# Patient Record
Sex: MALE | Race: WHITE | NOT HISPANIC OR LATINO | Employment: OTHER | URBAN - METROPOLITAN AREA
[De-identification: names, ages, dates, MRNs, and addresses within clinical notes are randomized per-mention and may not be internally consistent; named-entity substitution may affect disease eponyms.]

---

## 2023-03-09 DIAGNOSIS — I48.91 ATRIAL FIBRILLATION, UNSPECIFIED TYPE (MULTI): ICD-10-CM

## 2023-03-09 RX ORDER — VIT C/E/ZN/COPPR/LUTEIN/ZEAXAN 250MG-90MG
CAPSULE ORAL
COMMUNITY
Start: 2014-12-29 | End: 2023-11-10 | Stop reason: SDUPTHER

## 2023-03-09 RX ORDER — METFORMIN HYDROCHLORIDE 500 MG/1
500 TABLET ORAL
COMMUNITY
Start: 2021-02-22 | End: 2024-02-20 | Stop reason: WASHOUT

## 2023-03-09 RX ORDER — LANOLIN ALCOHOL/MO/W.PET/CERES
1 CREAM (GRAM) TOPICAL DAILY
COMMUNITY
Start: 2017-04-07 | End: 2023-11-10 | Stop reason: SDUPTHER

## 2023-03-09 RX ORDER — ASCORBIC ACID 500 MG
1 TABLET ORAL 2 TIMES DAILY
COMMUNITY
Start: 2022-08-16 | End: 2024-01-16 | Stop reason: WASHOUT

## 2023-03-09 RX ORDER — ASPIRIN 81 MG/1
1 TABLET ORAL DAILY
COMMUNITY
Start: 2014-12-29 | End: 2023-11-29 | Stop reason: WASHOUT

## 2023-03-09 RX ORDER — MULTIVITAMIN
1 TABLET ORAL DAILY
COMMUNITY

## 2023-03-09 RX ORDER — CYCLOBENZAPRINE HCL 5 MG
5 TABLET ORAL NIGHTLY
COMMUNITY
Start: 2020-06-05 | End: 2023-10-05 | Stop reason: HOSPADM

## 2023-03-09 RX ORDER — LEVOTHYROXINE SODIUM 50 UG/1
50 TABLET ORAL DAILY
COMMUNITY
End: 2023-03-27

## 2023-03-09 RX ORDER — NITROGLYCERIN 0.4 MG/1
0.4 TABLET SUBLINGUAL
COMMUNITY
Start: 2012-04-23 | End: 2023-10-05 | Stop reason: HOSPADM

## 2023-03-09 RX ORDER — BUSPIRONE HYDROCHLORIDE 7.5 MG/1
10 TABLET ORAL 2 TIMES DAILY
COMMUNITY
Start: 2022-08-16

## 2023-03-09 RX ORDER — BLOOD SUGAR DIAGNOSTIC
STRIP MISCELLANEOUS
COMMUNITY
Start: 2018-02-07 | End: 2023-11-29 | Stop reason: WASHOUT

## 2023-03-09 RX ORDER — DILTIAZEM HYDROCHLORIDE 360 MG/1
360 CAPSULE, EXTENDED RELEASE ORAL DAILY
COMMUNITY
End: 2023-03-27

## 2023-03-09 RX ORDER — PILOCARPINE HYDROCHLORIDE 5 MG/1
5 TABLET, FILM COATED ORAL 4 TIMES DAILY
COMMUNITY
End: 2023-07-05

## 2023-03-09 RX ORDER — PANTOPRAZOLE SODIUM 20 MG/1
20 TABLET, DELAYED RELEASE ORAL DAILY
COMMUNITY
End: 2023-03-27

## 2023-03-09 RX ORDER — BLOOD-GLUCOSE METER
EACH MISCELLANEOUS
COMMUNITY
Start: 2020-11-19 | End: 2023-10-05 | Stop reason: HOSPADM

## 2023-03-09 RX ORDER — HYDROXYCHLOROQUINE SULFATE 200 MG/1
1 TABLET, FILM COATED ORAL DAILY
COMMUNITY
Start: 2017-04-07 | End: 2023-03-27

## 2023-03-09 RX ORDER — METOPROLOL TARTRATE 100 MG/1
100 TABLET ORAL 2 TIMES DAILY
Qty: 180 TABLET | Refills: 1 | Status: SHIPPED | OUTPATIENT
Start: 2023-03-09 | End: 2023-07-09

## 2023-03-09 RX ORDER — LOSARTAN POTASSIUM 50 MG/1
0.5 TABLET ORAL DAILY
COMMUNITY
End: 2023-07-05

## 2023-03-09 RX ORDER — VILAZODONE HYDROCHLORIDE 40 MG/1
1 TABLET ORAL
COMMUNITY

## 2023-03-09 RX ORDER — LURASIDONE HYDROCHLORIDE 40 MG/1
40 TABLET, FILM COATED ORAL DAILY
COMMUNITY
End: 2023-11-10 | Stop reason: SDUPTHER

## 2023-03-09 RX ORDER — APIXABAN 5 MG/1
5 TABLET, FILM COATED ORAL 2 TIMES DAILY
COMMUNITY
End: 2023-03-13

## 2023-03-09 RX ORDER — METOPROLOL TARTRATE 100 MG/1
1 TABLET ORAL 2 TIMES DAILY
COMMUNITY
End: 2023-03-09 | Stop reason: SDUPTHER

## 2023-03-09 RX ORDER — TRAZODONE HYDROCHLORIDE 300 MG/1
300 TABLET ORAL NIGHTLY
COMMUNITY
End: 2023-10-05 | Stop reason: HOSPADM

## 2023-03-09 RX ORDER — HYDROXYZINE HYDROCHLORIDE 25 MG/1
1 TABLET, FILM COATED ORAL NIGHTLY PRN
COMMUNITY
Start: 2020-04-21 | End: 2023-03-27

## 2023-03-11 DIAGNOSIS — I48.91 UNSPECIFIED ATRIAL FIBRILLATION (MULTI): ICD-10-CM

## 2023-03-13 RX ORDER — APIXABAN 5 MG/1
TABLET, FILM COATED ORAL
Qty: 180 TABLET | Refills: 1 | Status: SHIPPED | OUTPATIENT
Start: 2023-03-13 | End: 2023-09-20

## 2023-03-26 DIAGNOSIS — I48.91 UNSPECIFIED ATRIAL FIBRILLATION (MULTI): ICD-10-CM

## 2023-03-27 DIAGNOSIS — E03.9 HYPOTHYROIDISM, UNSPECIFIED: ICD-10-CM

## 2023-03-27 DIAGNOSIS — G47.00 INSOMNIA, UNSPECIFIED: ICD-10-CM

## 2023-03-27 DIAGNOSIS — M32.9 SYSTEMIC LUPUS ERYTHEMATOSUS, UNSPECIFIED SLE TYPE, UNSPECIFIED ORGAN INVOLVEMENT STATUS (MULTI): Primary | ICD-10-CM

## 2023-03-27 DIAGNOSIS — M10.9 GOUT, UNSPECIFIED CAUSE, UNSPECIFIED CHRONICITY, UNSPECIFIED SITE: Primary | ICD-10-CM

## 2023-03-27 DIAGNOSIS — K21.9 GASTRO-ESOPHAGEAL REFLUX DISEASE WITHOUT ESOPHAGITIS: ICD-10-CM

## 2023-03-27 RX ORDER — LEVOTHYROXINE SODIUM 50 UG/1
TABLET ORAL
Qty: 90 TABLET | Refills: 1 | Status: SHIPPED | OUTPATIENT
Start: 2023-03-27 | End: 2023-09-20

## 2023-03-27 RX ORDER — HYDROXYZINE HYDROCHLORIDE 25 MG/1
TABLET, FILM COATED ORAL
Qty: 90 TABLET | Refills: 1 | Status: SHIPPED | OUTPATIENT
Start: 2023-03-27 | End: 2023-09-20

## 2023-03-27 RX ORDER — HYDROXYCHLOROQUINE SULFATE 200 MG/1
TABLET, FILM COATED ORAL
Qty: 90 TABLET | Refills: 3 | Status: SHIPPED | OUTPATIENT
Start: 2023-03-27 | End: 2024-02-20 | Stop reason: SDUPTHER

## 2023-03-27 RX ORDER — PANTOPRAZOLE SODIUM 20 MG/1
TABLET, DELAYED RELEASE ORAL
Qty: 90 TABLET | Refills: 1 | Status: SHIPPED | OUTPATIENT
Start: 2023-03-27 | End: 2024-02-20 | Stop reason: SDUPTHER

## 2023-03-27 RX ORDER — COLCHICINE 0.6 MG/1
TABLET ORAL
Qty: 30 TABLET | Refills: 2 | Status: SHIPPED | OUTPATIENT
Start: 2023-03-27 | End: 2023-05-21 | Stop reason: ALTCHOICE

## 2023-03-27 RX ORDER — DILTIAZEM HYDROCHLORIDE 360 MG/1
CAPSULE, EXTENDED RELEASE ORAL
Qty: 90 CAPSULE | Refills: 1 | Status: SHIPPED | OUTPATIENT
Start: 2023-03-27 | End: 2023-05-21

## 2023-04-13 ENCOUNTER — APPOINTMENT (OUTPATIENT)
Dept: PRIMARY CARE | Facility: CLINIC | Age: 68
End: 2023-04-13
Payer: MEDICARE

## 2023-05-02 PROCEDURE — 99223 1ST HOSP IP/OBS HIGH 75: CPT | Performed by: INTERNAL MEDICINE

## 2023-05-03 PROCEDURE — 99232 SBSQ HOSP IP/OBS MODERATE 35: CPT | Performed by: INTERNAL MEDICINE

## 2023-05-04 PROCEDURE — 99232 SBSQ HOSP IP/OBS MODERATE 35: CPT | Performed by: INTERNAL MEDICINE

## 2023-05-05 PROCEDURE — 99232 SBSQ HOSP IP/OBS MODERATE 35: CPT | Performed by: INTERNAL MEDICINE

## 2023-05-06 PROCEDURE — 99232 SBSQ HOSP IP/OBS MODERATE 35: CPT | Performed by: INTERNAL MEDICINE

## 2023-05-07 PROCEDURE — 99232 SBSQ HOSP IP/OBS MODERATE 35: CPT | Performed by: INTERNAL MEDICINE

## 2023-05-08 PROCEDURE — 99232 SBSQ HOSP IP/OBS MODERATE 35: CPT | Performed by: INTERNAL MEDICINE

## 2023-05-09 ENCOUNTER — APPOINTMENT (OUTPATIENT)
Dept: PRIMARY CARE | Facility: CLINIC | Age: 68
End: 2023-05-09
Payer: MEDICARE

## 2023-05-09 PROCEDURE — 99232 SBSQ HOSP IP/OBS MODERATE 35: CPT | Performed by: INTERNAL MEDICINE

## 2023-05-10 PROCEDURE — 99238 HOSP IP/OBS DSCHRG MGMT 30/<: CPT | Performed by: INTERNAL MEDICINE

## 2023-05-11 ENCOUNTER — DOCUMENTATION (OUTPATIENT)
Dept: PRIMARY CARE | Facility: CLINIC | Age: 68
End: 2023-05-11
Payer: MEDICARE

## 2023-05-11 ENCOUNTER — PATIENT OUTREACH (OUTPATIENT)
Dept: PRIMARY CARE | Facility: CLINIC | Age: 68
End: 2023-05-11
Payer: MEDICARE

## 2023-05-11 DIAGNOSIS — R07.9 CHEST PAIN, UNSPECIFIED TYPE: ICD-10-CM

## 2023-05-11 DIAGNOSIS — I50.9 CONGESTIVE HEART FAILURE, UNSPECIFIED HF CHRONICITY, UNSPECIFIED HEART FAILURE TYPE (MULTI): ICD-10-CM

## 2023-05-11 DIAGNOSIS — U07.1 COVID-19: ICD-10-CM

## 2023-05-11 RX ORDER — IPRATROPIUM BROMIDE AND ALBUTEROL SULFATE 2.5; .5 MG/3ML; MG/3ML
3 SOLUTION RESPIRATORY (INHALATION)
COMMUNITY
End: 2023-05-16 | Stop reason: SDUPTHER

## 2023-05-11 RX ORDER — ALLOPURINOL 100 MG/1
100 TABLET ORAL
Qty: 30 TABLET | Refills: 2 | COMMUNITY
Start: 2023-04-07 | End: 2023-07-06

## 2023-05-11 NOTE — PROGRESS NOTES
Discharge Facility: [facility]   Discharge Diagnosis: Chest pain, Covid-19, CHF  Admission Date: 5/3/2023  Discharge Date: 5/10/2023    PCP Appointment Date: 5/16/2023 1500  Specialist Appointment Date: None   Hospital Encounter and Summary: Linked   See discharge assessment below for further details     Engagement  Call Start Time: 1502 (5/12/2023  3:07 PM)    Medications  Medications reviewed with patient/caregiver?: Yes (No changes in medication regime made.) (5/12/2023  3:07 PM)  Is the patient having any side effects they believe may be caused by any medication additions or changes?: No (5/12/2023  3:07 PM)  Does the patient have all medications ordered at discharge?: Yes (5/12/2023  3:07 PM)  Care Management Interventions: No intervention needed (5/12/2023  3:07 PM)  Is the patient taking all medications as directed (includes completed medication regime)?: Yes (5/12/2023  3:07 PM)    Appointments  Does the patient have a primary care provider?: Yes (5/12/2023  3:07 PM)  Care Management Interventions: Verified appointment date/time/provider (Idania Sharma 5/16/2023 1500) (5/12/2023  3:07 PM)  Has the patient kept scheduled appointments due by today?: Not applicable (5/12/2023  3:07 PM)    Self Management  What is the home health agency?: N/A self care (5/12/2023  3:07 PM)  What Durable Medical Equipment (DME) was ordered?: N/A (5/12/2023  3:07 PM)    Patient Teaching  Does the patient have access to their discharge instructions?: Yes (5/12/2023  3:07 PM)  Care Management Interventions: Reviewed instructions with patient (5/12/2023  3:07 PM)  What is the patient's perception of their health status since discharge?: Same (Patient states he still feels SOB. No other complaints. Advised recovery may take time.) (5/12/2023  3:07 PM)  Is the patient/caregiver able to teach back the hierarchy of who to call/visit for symptoms/problems? PCP, Specialist, Home Health nurse, Urgent Care, ED, 911: Yes (5/12/2023  3:07  PM)

## 2023-05-16 ENCOUNTER — OFFICE VISIT (OUTPATIENT)
Dept: PRIMARY CARE | Facility: CLINIC | Age: 68
End: 2023-05-16
Payer: MEDICARE

## 2023-05-16 VITALS
OXYGEN SATURATION: 95 % | WEIGHT: 222 LBS | RESPIRATION RATE: 18 BRPM | DIASTOLIC BLOOD PRESSURE: 72 MMHG | BODY MASS INDEX: 35.68 KG/M2 | HEART RATE: 93 BPM | HEIGHT: 66 IN | SYSTOLIC BLOOD PRESSURE: 124 MMHG

## 2023-05-16 DIAGNOSIS — I25.10 ARTERIOSCLEROSIS OF CORONARY ARTERY: ICD-10-CM

## 2023-05-16 DIAGNOSIS — F33.9 RECURRENT MAJOR DEPRESSIVE DISORDER, REMISSION STATUS UNSPECIFIED (CMS-HCC): ICD-10-CM

## 2023-05-16 DIAGNOSIS — M06.9 RHEUMATOID ARTHRITIS, INVOLVING UNSPECIFIED SITE, UNSPECIFIED WHETHER RHEUMATOID FACTOR PRESENT (MULTI): ICD-10-CM

## 2023-05-16 DIAGNOSIS — K76.0 NAFLD (NONALCOHOLIC FATTY LIVER DISEASE): ICD-10-CM

## 2023-05-16 DIAGNOSIS — F41.9 ANXIETY DISORDER, UNSPECIFIED TYPE: ICD-10-CM

## 2023-05-16 DIAGNOSIS — M32.9 SYSTEMIC LUPUS ERYTHEMATOSUS, UNSPECIFIED SLE TYPE, UNSPECIFIED ORGAN INVOLVEMENT STATUS (MULTI): ICD-10-CM

## 2023-05-16 DIAGNOSIS — I48.91 ATRIAL FIBRILLATION, UNSPECIFIED TYPE (MULTI): ICD-10-CM

## 2023-05-16 DIAGNOSIS — Z09 HOSPITAL DISCHARGE FOLLOW-UP: ICD-10-CM

## 2023-05-16 DIAGNOSIS — E11.69 TYPE 2 DIABETES MELLITUS WITH OTHER SPECIFIED COMPLICATION, WITHOUT LONG-TERM CURRENT USE OF INSULIN (MULTI): ICD-10-CM

## 2023-05-16 DIAGNOSIS — J18.9 PNEUMONIA OF LEFT LUNG DUE TO INFECTIOUS ORGANISM, UNSPECIFIED PART OF LUNG: Primary | ICD-10-CM

## 2023-05-16 DIAGNOSIS — M35.00 SJOGREN'S SYNDROME, WITH UNSPECIFIED ORGAN INVOLVEMENT (MULTI): ICD-10-CM

## 2023-05-16 PROBLEM — J30.9 ALLERGIC RHINITIS: Status: RESOLVED | Noted: 2023-05-16 | Resolved: 2023-05-16

## 2023-05-16 PROBLEM — M17.11 ARTHRITIS OF KNEE, RIGHT: Status: ACTIVE | Noted: 2023-05-16

## 2023-05-16 PROBLEM — K21.9 ESOPHAGEAL REFLUX: Status: ACTIVE | Noted: 2023-05-16

## 2023-05-16 PROBLEM — E11.9 DIABETES MELLITUS (MULTI): Status: ACTIVE | Noted: 2023-05-16

## 2023-05-16 PROBLEM — K82.9 GALLBLADDER ATTACK: Status: RESOLVED | Noted: 2023-05-16 | Resolved: 2023-05-16

## 2023-05-16 PROBLEM — R40.0 DAYTIME SOMNOLENCE: Status: RESOLVED | Noted: 2023-05-16 | Resolved: 2023-05-16

## 2023-05-16 PROBLEM — D64.9 ANEMIA: Status: ACTIVE | Noted: 2023-05-16

## 2023-05-16 PROBLEM — K46.9 ABDOMINAL HERNIA: Status: ACTIVE | Noted: 2023-05-16

## 2023-05-16 PROBLEM — K43.9 ABDOMINAL WALL HERNIA: Status: ACTIVE | Noted: 2023-05-16

## 2023-05-16 LAB — POC HEMOGLOBIN A1C: 5.9 % (ref 4.2–6.5)

## 2023-05-16 PROCEDURE — 3078F DIAST BP <80 MM HG: CPT | Performed by: NURSE PRACTITIONER

## 2023-05-16 PROCEDURE — 1159F MED LIST DOCD IN RCRD: CPT | Performed by: NURSE PRACTITIONER

## 2023-05-16 PROCEDURE — 99214 OFFICE O/P EST MOD 30 MIN: CPT | Performed by: NURSE PRACTITIONER

## 2023-05-16 PROCEDURE — 99496 TRANSJ CARE MGMT HIGH F2F 7D: CPT | Performed by: NURSE PRACTITIONER

## 2023-05-16 PROCEDURE — 1036F TOBACCO NON-USER: CPT | Performed by: NURSE PRACTITIONER

## 2023-05-16 PROCEDURE — 1160F RVW MEDS BY RX/DR IN RCRD: CPT | Performed by: NURSE PRACTITIONER

## 2023-05-16 PROCEDURE — 3074F SYST BP LT 130 MM HG: CPT | Performed by: NURSE PRACTITIONER

## 2023-05-16 PROCEDURE — 4010F ACE/ARB THERAPY RXD/TAKEN: CPT | Performed by: NURSE PRACTITIONER

## 2023-05-16 PROCEDURE — 83036 HEMOGLOBIN GLYCOSYLATED A1C: CPT | Performed by: NURSE PRACTITIONER

## 2023-05-16 RX ORDER — PREDNISONE 20 MG/1
40 TABLET ORAL DAILY
Qty: 10 TABLET | Refills: 0 | Status: SHIPPED | OUTPATIENT
Start: 2023-05-16 | End: 2023-05-21

## 2023-05-16 RX ORDER — AMITRIPTYLINE HYDROCHLORIDE 75 MG/1
75 TABLET ORAL NIGHTLY
COMMUNITY
Start: 2014-06-25 | End: 2023-10-05 | Stop reason: HOSPADM

## 2023-05-16 RX ORDER — IPRATROPIUM BROMIDE AND ALBUTEROL SULFATE 2.5; .5 MG/3ML; MG/3ML
3 SOLUTION RESPIRATORY (INHALATION) EVERY 6 HOURS PRN
Qty: 180 ML | Refills: 3 | Status: SHIPPED | OUTPATIENT
Start: 2023-05-16 | End: 2023-09-12

## 2023-05-16 NOTE — PATIENT INSTRUCTIONS
Have chest xray done and labs  I will let you know how it looks - I may need to put you on antibiotics again  Please make an appt. With Dr. Bandar GALINDO    Your A1c was 5.7 today  Sent in steroids for your gout - will help with breathing as well

## 2023-05-16 NOTE — PROGRESS NOTES
"                                                                                                                                                                                                                                                                                              Subjective   Patient ID: Eugenio Bliss is a 67 y.o. male who presents for Hospital Follow-up (Patient in today for hospital F/U and A1C check. States that he was recently hospitalized for 8 days d/t Pneumonia. ).    HPI     Review of Systems    Objective   /72   Pulse 93   Resp 18   Ht 1.676 m (5' 6\")   Wt 101 kg (222 lb)   SpO2 95%   BMI 35.83 kg/m²     Physical Exam    Assessment/Plan          "

## 2023-05-21 PROBLEM — E29.1 TESTICULAR HYPOFUNCTION: Status: RESOLVED | Noted: 2023-05-21 | Resolved: 2023-05-21

## 2023-05-21 PROBLEM — K40.90 INGUINAL HERNIA: Status: RESOLVED | Noted: 2023-05-21 | Resolved: 2023-05-21

## 2023-05-21 PROBLEM — G47.33 MILD OBSTRUCTIVE SLEEP APNEA: Status: ACTIVE | Noted: 2023-05-21

## 2023-05-21 PROBLEM — G47.00 INSOMNIA: Status: RESOLVED | Noted: 2023-05-21 | Resolved: 2023-05-21

## 2023-05-21 PROBLEM — E55.9 VITAMIN D INSUFFICIENCY: Status: RESOLVED | Noted: 2023-05-21 | Resolved: 2023-05-21

## 2023-05-21 PROBLEM — K43.9 ABDOMINAL WALL HERNIA: Status: RESOLVED | Noted: 2023-05-16 | Resolved: 2023-05-21

## 2023-05-21 PROBLEM — M25.561 RIGHT KNEE PAIN: Status: RESOLVED | Noted: 2023-05-21 | Resolved: 2023-05-21

## 2023-05-21 PROBLEM — K46.9 ABDOMINAL HERNIA: Status: RESOLVED | Noted: 2023-05-16 | Resolved: 2023-05-21

## 2023-05-21 PROBLEM — K76.0 NAFLD (NONALCOHOLIC FATTY LIVER DISEASE): Status: ACTIVE | Noted: 2023-05-21

## 2023-05-21 PROBLEM — I48.91 AFIB (MULTI): Status: ACTIVE | Noted: 2023-05-21

## 2023-05-21 PROBLEM — E03.9 HYPOTHYROIDISM: Status: ACTIVE | Noted: 2023-05-21

## 2023-05-21 PROBLEM — G47.34 SLEEP RELATED HYPOXIA: Status: RESOLVED | Noted: 2023-05-21 | Resolved: 2023-05-21

## 2023-05-21 PROBLEM — K74.60 HEPATIC CIRRHOSIS (MULTI): Status: RESOLVED | Noted: 2023-05-21 | Resolved: 2023-05-21

## 2023-05-21 PROBLEM — E78.00 HYPERCHOLESTEREMIA: Status: ACTIVE | Noted: 2023-05-21

## 2023-05-21 PROBLEM — M06.9 RHEUMATOID ARTHRITIS (MULTI): Status: ACTIVE | Noted: 2023-05-21

## 2023-05-21 PROBLEM — R06.83 SNORING: Status: RESOLVED | Noted: 2023-05-21 | Resolved: 2023-05-21

## 2023-05-21 PROBLEM — M32.9 SYSTEMIC LUPUS ERYTHEMATOSUS (MULTI): Status: ACTIVE | Noted: 2023-05-21

## 2023-05-21 PROBLEM — J18.9 PNEUMONIA OF LEFT LUNG DUE TO INFECTIOUS ORGANISM: Status: ACTIVE | Noted: 2023-05-21

## 2023-05-21 PROBLEM — K74.60 HEPATIC CIRRHOSIS (MULTI): Status: ACTIVE | Noted: 2023-05-21

## 2023-05-21 PROBLEM — J33.9 NASAL POLYPS: Status: RESOLVED | Noted: 2023-05-21 | Resolved: 2023-05-21

## 2023-05-21 PROBLEM — M17.0 PRIMARY OSTEOARTHRITIS OF BOTH KNEES: Status: ACTIVE | Noted: 2023-05-21

## 2023-05-21 PROBLEM — I10 HYPERTENSION: Status: RESOLVED | Noted: 2023-05-21 | Resolved: 2023-05-21

## 2023-05-21 PROBLEM — Z09 HOSPITAL DISCHARGE FOLLOW-UP: Status: ACTIVE | Noted: 2023-05-21

## 2023-05-21 PROBLEM — M35.00 SJOGRENS SYNDROME (MULTI): Status: ACTIVE | Noted: 2023-05-21

## 2023-05-21 PROBLEM — D64.9 ANEMIA: Status: RESOLVED | Noted: 2023-05-16 | Resolved: 2023-05-21

## 2023-05-21 NOTE — PROGRESS NOTES
"Patient: Eugenio Bliss  : 1955  PCP: Idania Sharma, APRN-CNP  MRN: [id number]  Program: No linked episodes     Eugenio Bliss is a 67 y.o. male presenting today for follow-up after being discharged from the hospital 6 days ago. The main problem requiring admission was pneumonia. The discharge summary and/or Transitional Care Management documentation was reviewed. Medication reconciliation was performed as indicated via the \"Peyman as Reviewed\" timestamp.     Eugenio Bliss was contacted by Transitional Care Management services two days after his discharge. This encounter and supporting documentation was reviewed.    The complexity of medical decision making for this patient's transitional care is high.    He was admitted for 8 days to [facility] due to increased shortness of breath and pneumonia.  He is following up today for his diabetes as well.  Sugars have been well controlled.  He is denies polyuria, polydipsia, or blurred vision.  He does wear oxygen at bedtime  He is finding that he does tire easily and is short of breath.  He has not followed up with pulmonology.  [facility] Dr. Portillo while in the hospital    Physical Exam  Vitals and nursing note reviewed.   Constitutional:       Appearance: Normal appearance.   HENT:      Head: Normocephalic and atraumatic.      Right Ear: External ear normal.      Left Ear: External ear normal.      Nose: Nose normal.      Mouth/Throat:      Mouth: Mucous membranes are moist.      Pharynx: Oropharynx is clear.   Eyes:      Extraocular Movements: Extraocular movements intact.      Conjunctiva/sclera: Conjunctivae normal.      Pupils: Pupils are equal, round, and reactive to light.   Neck:      Vascular: No carotid bruit.   Cardiovascular:      Rate and Rhythm: Normal rate and regular rhythm.      Pulses: Normal pulses.      Heart sounds: Normal heart sounds.   Pulmonary:      Effort: Pulmonary effort is normal. No respiratory " distress.      Breath sounds: Normal breath sounds. No stridor. No wheezing or rhonchi.   Abdominal:      General: Bowel sounds are normal.   Musculoskeletal:         General: Normal range of motion.      Cervical back: Normal range of motion.      Right lower leg: No edema.      Left lower leg: No edema.   Lymphadenopathy:      Cervical: No cervical adenopathy.   Skin:     General: Skin is warm and dry.   Neurological:      General: No focal deficit present.      Mental Status: He is alert and oriented to person, place, and time. Mental status is at baseline.      Cranial Nerves: No cranial nerve deficit.      Motor: No weakness.   Psychiatric:         Mood and Affect: Mood normal.         Behavior: Behavior normal.         Thought Content: Thought content normal.         Judgment: Judgment normal.         Assessment/Plan   Problem List Items Addressed This Visit          Respiratory    Pneumonia of left lung due to infectious organism - Primary     Reviewed hospitalization  Repeat chest x-ray  Prednisone burst  Follow-up with pulmonology  Home nebulizer ordered  DuoNeb ordered         Relevant Medications    nebulizer accessories kit    ipratropium-albuteroL (Duo-Neb) 0.5-2.5 mg/3 mL nebulizer solution    predniSONE (Deltasone) 20 mg tablet    Other Relevant Orders    CBC    Comprehensive Metabolic Panel    XR chest 2 views (Completed)    Home nebulizer       Circulatory    Arteriosclerosis of coronary artery     Stable  Follows cardiology Dr. Ray  Continue metoprolol  Continue losartan  Reviewed labs from hospitalization  Continue Eliquis         Afib (CMS/[facility])       Digestive    NAFLD (nonalcoholic fatty liver disease)     Stable  Follows GI            Endocrine/Metabolic    Diabetes mellitus (CMS/[facility])     -  in office A1C 5.9   last A1C 5.7  -  in office /72  -  continue metformin   - continue diet changes of lower carb intake and increased protein and vegetable intake  - increase activity to help  with weight loss  - follow up 4 months         Relevant Orders    POCT glycosylated hemoglobin (Hb A1C) manually resulted (Completed)       Other    Anxiety disorder     Stable  Continue Latuda  Continue hydroxyzine  Continue trazodone  Continue Viibryd         Depression, major, recurrent (CMS/HCC)     Stable  Continue Latuda  Continue hydroxyzine  Continue trazodone  Continue Viibryd         Hospital discharge follow-up     Reviewed hospitalization  Repeat chest x-ray  Prednisone burst  Follow-up with pulmonology  Home nebulizer ordered  DuoNeb ordered         Rheumatoid arthritis (CMS/HCC)     Stable  Continue pilocarpine  Continue hydroxychloroquine  Follow-up with rheumatology         Sjogrens syndrome (CMS/HCC)     Stable  Continue pilocarpine  Continue hydroxychloroquine  Follow-up with rheumatology         Systemic lupus erythematosus (CMS/HCC)     Stable  Continue pilocarpine  Continue hydroxychloroquine  Follow-up with rheumatology            Review of Systems   All other systems reviewed and are negative.      No family history on file.    Engagement  Call Start Time: 1502 (5/12/2023  3:07 PM)    Medications  Medications reviewed with patient/caregiver?: Yes (No changes in medication regime made.) (5/12/2023  3:07 PM)  Is the patient having any side effects they believe may be caused by any medication additions or changes?: No (5/12/2023  3:07 PM)  Does the patient have all medications ordered at discharge?: Yes (5/12/2023  3:07 PM)  Care Management Interventions: No intervention needed (5/12/2023  3:07 PM)  Is the patient taking all medications as directed (includes completed medication regime)?: Yes (5/12/2023  3:07 PM)    Appointments  Does the patient have a primary care provider?: Yes (5/12/2023  3:07 PM)  Care Management Interventions: Verified appointment date/time/provider (Idania Sharma 5/16/2023 1500) (5/12/2023  3:07 PM)  Has the patient kept scheduled appointments due by today?: Not  applicable (5/12/2023  3:07 PM)    Self Management  What is the home health agency?: N/A self care (5/12/2023  3:07 PM)  What Durable Medical Equipment (DME) was ordered?: N/A (5/12/2023  3:07 PM)    Patient Teaching  Does the patient have access to their discharge instructions?: Yes (5/12/2023  3:07 PM)  Care Management Interventions: Reviewed instructions with patient (5/12/2023  3:07 PM)  What is the patient's perception of their health status since discharge?: Same (Patient states he still feels SOB. No other complaints. Advised recovery may take time.) (5/12/2023  3:07 PM)  Is the patient/caregiver able to teach back the hierarchy of who to call/visit for symptoms/problems? PCP, Specialist, Home Health nurse, Urgent Care, ED, 911: Yes (5/12/2023  3:07 PM)        No follow-ups on file.

## 2023-05-21 NOTE — ASSESSMENT & PLAN NOTE
Reviewed hospitalization  Repeat chest x-ray  Prednisone burst  Follow-up with pulmonology  Home nebulizer ordered  DuoNeb ordered

## 2023-05-21 NOTE — ASSESSMENT & PLAN NOTE
-  in office A1C 5.9   last A1C 5.7  -  in office /72  -  continue metformin   - continue diet changes of lower carb intake and increased protein and vegetable intake  - increase activity to help with weight loss  - follow up 4 months

## 2023-05-21 NOTE — ASSESSMENT & PLAN NOTE
Stable  Follows cardiology Dr. Ray  Continue metoprolol  Continue losartan  Reviewed labs from hospitalization  Continue Eliquis

## 2023-05-26 ENCOUNTER — PATIENT OUTREACH (OUTPATIENT)
Dept: PRIMARY CARE | Facility: CLINIC | Age: 68
End: 2023-05-26
Payer: MEDICARE

## 2023-05-26 NOTE — PROGRESS NOTES
Call regarding appt with Idania Sharma APR, CNP on 5/16/2023 after hospitalization for pneumonia. At time of outreach call, the patient feels as if his condition is about the same. The patient reports he is still very fatigued and continues to have shortness of breath. DuoNeb, home nebulizer and prednisone burst ordered at appt. The patient states he is taking all medication as directed. The patient has a FU with pulmonology next week. Patient has TCM contact info for questions or concerns.

## 2023-06-28 ENCOUNTER — PATIENT OUTREACH (OUTPATIENT)
Dept: PRIMARY CARE | Facility: CLINIC | Age: 68
End: 2023-06-28
Payer: MEDICARE

## 2023-06-28 NOTE — PROGRESS NOTES
Unable to reach patient for two month post discharge follow up call. LVM with call back number for patient to call if needed.

## 2023-07-04 DIAGNOSIS — M06.9 RHEUMATOID ARTHRITIS, UNSPECIFIED (MULTI): ICD-10-CM

## 2023-07-05 RX ORDER — LOSARTAN POTASSIUM 50 MG/1
TABLET ORAL
Qty: 135 TABLET | Refills: 1 | Status: SHIPPED | OUTPATIENT
Start: 2023-07-05 | End: 2023-10-25

## 2023-07-05 RX ORDER — PILOCARPINE HYDROCHLORIDE 5 MG/1
TABLET, FILM COATED ORAL
Qty: 360 TABLET | Refills: 1 | Status: SHIPPED | OUTPATIENT
Start: 2023-07-05 | End: 2023-10-05 | Stop reason: HOSPADM

## 2023-07-07 DIAGNOSIS — I48.91 ATRIAL FIBRILLATION, UNSPECIFIED TYPE (MULTI): ICD-10-CM

## 2023-07-09 RX ORDER — METOPROLOL TARTRATE 100 MG/1
TABLET ORAL
Qty: 180 TABLET | Refills: 1 | Status: SHIPPED | OUTPATIENT
Start: 2023-07-09 | End: 2023-11-10 | Stop reason: SDUPTHER

## 2023-08-08 ENCOUNTER — PATIENT OUTREACH (OUTPATIENT)
Dept: PRIMARY CARE | Facility: CLINIC | Age: 68
End: 2023-08-08
Payer: MEDICARE

## 2023-08-08 NOTE — PROGRESS NOTES
Call placed regarding 90 day TCM wrap-up of services. At the time of call, the patient states he is doing very well. No questions or concerns for primary care provider. The pt has met the 30 day and 90 day rehospitalization goals and discharged from TCM services.

## 2023-08-22 ENCOUNTER — APPOINTMENT (OUTPATIENT)
Dept: PRIMARY CARE | Facility: CLINIC | Age: 68
End: 2023-08-22
Payer: MEDICARE

## 2023-09-12 DIAGNOSIS — J18.9 PNEUMONIA OF LEFT LUNG DUE TO INFECTIOUS ORGANISM, UNSPECIFIED PART OF LUNG: ICD-10-CM

## 2023-09-12 RX ORDER — IPRATROPIUM BROMIDE AND ALBUTEROL SULFATE 2.5; .5 MG/3ML; MG/3ML
3 SOLUTION RESPIRATORY (INHALATION) EVERY 6 HOURS PRN
Qty: 180 ML | Refills: 3 | Status: SHIPPED | OUTPATIENT
Start: 2023-09-12 | End: 2023-11-10 | Stop reason: ALTCHOICE

## 2023-09-20 DIAGNOSIS — E03.9 HYPOTHYROIDISM, UNSPECIFIED: ICD-10-CM

## 2023-09-20 DIAGNOSIS — G47.00 INSOMNIA, UNSPECIFIED: ICD-10-CM

## 2023-09-20 DIAGNOSIS — I48.91 UNSPECIFIED ATRIAL FIBRILLATION (MULTI): ICD-10-CM

## 2023-09-20 RX ORDER — HYDROXYZINE HYDROCHLORIDE 25 MG/1
TABLET, FILM COATED ORAL
Qty: 90 TABLET | Refills: 1 | Status: SHIPPED | OUTPATIENT
Start: 2023-09-20 | End: 2023-11-10 | Stop reason: SDUPTHER

## 2023-09-20 RX ORDER — LEVOTHYROXINE SODIUM 50 UG/1
TABLET ORAL
Qty: 90 TABLET | Refills: 1 | Status: SHIPPED | OUTPATIENT
Start: 2023-09-20 | End: 2024-02-20 | Stop reason: SDUPTHER

## 2023-09-20 RX ORDER — APIXABAN 5 MG/1
5 TABLET, FILM COATED ORAL 2 TIMES DAILY
Qty: 180 TABLET | Refills: 1 | Status: SHIPPED | OUTPATIENT
Start: 2023-09-20 | End: 2024-02-20 | Stop reason: SDUPTHER

## 2023-09-30 ENCOUNTER — HOSPITAL ENCOUNTER (EMERGENCY)
Facility: HOSPITAL | Age: 68
Discharge: HOME | DRG: 552 | End: 2023-09-30
Attending: STUDENT IN AN ORGANIZED HEALTH CARE EDUCATION/TRAINING PROGRAM
Payer: MEDICARE

## 2023-09-30 VITALS
OXYGEN SATURATION: 97 % | WEIGHT: 209.22 LBS | SYSTOLIC BLOOD PRESSURE: 134 MMHG | HEART RATE: 114 BPM | RESPIRATION RATE: 24 BRPM | DIASTOLIC BLOOD PRESSURE: 84 MMHG | HEIGHT: 67 IN | TEMPERATURE: 100.4 F | BODY MASS INDEX: 32.84 KG/M2

## 2023-09-30 PROCEDURE — 99281 EMR DPT VST MAYX REQ PHY/QHP: CPT | Mod: 25

## 2023-09-30 PROCEDURE — 99285 EMERGENCY DEPT VISIT HI MDM: CPT | Performed by: STUDENT IN AN ORGANIZED HEALTH CARE EDUCATION/TRAINING PROGRAM

## 2023-09-30 ASSESSMENT — COLUMBIA-SUICIDE SEVERITY RATING SCALE - C-SSRS
1. IN THE PAST MONTH, HAVE YOU WISHED YOU WERE DEAD OR WISHED YOU COULD GO TO SLEEP AND NOT WAKE UP?: NO
2. HAVE YOU ACTUALLY HAD ANY THOUGHTS OF KILLING YOURSELF?: NO
6. HAVE YOU EVER DONE ANYTHING, STARTED TO DO ANYTHING, OR PREPARED TO DO ANYTHING TO END YOUR LIFE?: NO

## 2023-09-30 ASSESSMENT — PAIN DESCRIPTION - PAIN TYPE: TYPE: ACUTE PAIN

## 2023-09-30 ASSESSMENT — PAIN - FUNCTIONAL ASSESSMENT: PAIN_FUNCTIONAL_ASSESSMENT: 0-10

## 2023-09-30 ASSESSMENT — PAIN SCALES - GENERAL: PAINLEVEL_OUTOF10: 10 - WORST POSSIBLE PAIN

## 2023-09-30 ASSESSMENT — PAIN DESCRIPTION - LOCATION: LOCATION: FOOT

## 2023-09-30 ASSESSMENT — PAIN DESCRIPTION - ORIENTATION: ORIENTATION: LEFT

## 2023-09-30 ASSESSMENT — PAIN DESCRIPTION - DESCRIPTORS: DESCRIPTORS: ACHING;SHARP

## 2023-10-01 ENCOUNTER — APPOINTMENT (OUTPATIENT)
Dept: RADIOLOGY | Facility: HOSPITAL | Age: 68
DRG: 552 | End: 2023-10-01
Payer: MEDICARE

## 2023-10-01 ENCOUNTER — HOSPITAL ENCOUNTER (INPATIENT)
Facility: HOSPITAL | Age: 68
LOS: 4 days | Discharge: NURSING, REHAB OR LONG-TERM CARE | DRG: 552 | End: 2023-10-05
Attending: EMERGENCY MEDICINE | Admitting: INTERNAL MEDICINE
Payer: MEDICARE

## 2023-10-01 DIAGNOSIS — I63.9 CEREBROVASCULAR ACCIDENT (CVA), UNSPECIFIED MECHANISM (MULTI): ICD-10-CM

## 2023-10-01 DIAGNOSIS — M10.9 ACUTE GOUT OF LEFT ANKLE, UNSPECIFIED CAUSE: Primary | ICD-10-CM

## 2023-10-01 DIAGNOSIS — M10.9 ACUTE GOUT OF LEFT KNEE, UNSPECIFIED CAUSE: ICD-10-CM

## 2023-10-01 DIAGNOSIS — I63.9 STROKE OF UNKNOWN CAUSE (MULTI): ICD-10-CM

## 2023-10-01 LAB
ALBUMIN SERPL-MCNC: 4.2 G/DL (ref 3.5–5)
ALP BLD-CCNC: 87 U/L (ref 35–125)
ALT SERPL-CCNC: 12 U/L (ref 5–40)
ANION GAP SERPL CALC-SCNC: 17 MMOL/L
AST SERPL-CCNC: 18 U/L (ref 5–40)
BILIRUB DIRECT SERPL-MCNC: 0.5 MG/DL (ref 0–0.2)
BILIRUB SERPL-MCNC: 2.8 MG/DL (ref 0.1–1.2)
BUN SERPL-MCNC: 27 MG/DL (ref 8–25)
CALCIUM SERPL-MCNC: 9.9 MG/DL (ref 8.5–10.4)
CHLORIDE SERPL-SCNC: 97 MMOL/L (ref 97–107)
CO2 SERPL-SCNC: 21 MMOL/L (ref 24–31)
CREAT SERPL-MCNC: 1.1 MG/DL (ref 0.4–1.6)
ERYTHROCYTE [DISTWIDTH] IN BLOOD BY AUTOMATED COUNT: 14.3 % (ref 11.5–14.5)
GFR SERPL CREATININE-BSD FRML MDRD: 73 ML/MIN/1.73M*2
GLUCOSE BLD MANUAL STRIP-MCNC: 147 MG/DL (ref 74–99)
GLUCOSE SERPL-MCNC: 104 MG/DL (ref 65–99)
HCT VFR BLD AUTO: 46.6 % (ref 41–52)
HGB BLD-MCNC: 16.1 G/DL (ref 13.5–17.5)
MCH RBC QN AUTO: 30.1 PG (ref 26–34)
MCHC RBC AUTO-ENTMCNC: 34.5 G/DL (ref 32–36)
MCV RBC AUTO: 87 FL (ref 80–100)
NRBC BLD-RTO: 0 /100 WBCS (ref 0–0)
PLATELET # BLD AUTO: 153 X10*3/UL (ref 150–450)
PMV BLD AUTO: 9.2 FL (ref 7.5–11.5)
POTASSIUM SERPL-SCNC: 4 MMOL/L (ref 3.4–5.1)
PROT SERPL-MCNC: 8.1 G/DL (ref 5.9–7.9)
RBC # BLD AUTO: 5.34 X10*6/UL (ref 4.5–5.9)
SODIUM SERPL-SCNC: 135 MMOL/L (ref 133–145)
TROPONIN T SERPL-MCNC: 30 NG/L
WBC # BLD AUTO: 8 X10*3/UL (ref 4.4–11.3)

## 2023-10-01 PROCEDURE — 82248 BILIRUBIN DIRECT: CPT | Performed by: EMERGENCY MEDICINE

## 2023-10-01 PROCEDURE — G1004 CDSM NDSC: HCPCS

## 2023-10-01 PROCEDURE — 71045 X-RAY EXAM CHEST 1 VIEW: CPT | Mod: FY

## 2023-10-01 PROCEDURE — 1100000001 HC PRIVATE ROOM DAILY

## 2023-10-01 PROCEDURE — 80053 COMPREHEN METABOLIC PANEL: CPT | Performed by: EMERGENCY MEDICINE

## 2023-10-01 PROCEDURE — 84484 ASSAY OF TROPONIN QUANT: CPT | Performed by: EMERGENCY MEDICINE

## 2023-10-01 PROCEDURE — 85027 COMPLETE CBC AUTOMATED: CPT | Performed by: EMERGENCY MEDICINE

## 2023-10-01 PROCEDURE — 2500000001 HC RX 250 WO HCPCS SELF ADMINISTERED DRUGS (ALT 637 FOR MEDICARE OP): Performed by: INTERNAL MEDICINE

## 2023-10-01 PROCEDURE — 96374 THER/PROPH/DIAG INJ IV PUSH: CPT

## 2023-10-01 PROCEDURE — 36415 COLL VENOUS BLD VENIPUNCTURE: CPT | Performed by: EMERGENCY MEDICINE

## 2023-10-01 PROCEDURE — 2500000001 HC RX 250 WO HCPCS SELF ADMINISTERED DRUGS (ALT 637 FOR MEDICARE OP): Performed by: EMERGENCY MEDICINE

## 2023-10-01 PROCEDURE — 2500000004 HC RX 250 GENERAL PHARMACY W/ HCPCS (ALT 636 FOR OP/ED): Performed by: EMERGENCY MEDICINE

## 2023-10-01 PROCEDURE — 2500000004 HC RX 250 GENERAL PHARMACY W/ HCPCS (ALT 636 FOR OP/ED): Performed by: INTERNAL MEDICINE

## 2023-10-01 PROCEDURE — 2500000005 HC RX 250 GENERAL PHARMACY W/O HCPCS: Performed by: INTERNAL MEDICINE

## 2023-10-01 PROCEDURE — 99285 EMERGENCY DEPT VISIT HI MDM: CPT | Performed by: EMERGENCY MEDICINE

## 2023-10-01 PROCEDURE — 2060000001 HC INTERMEDIATE ICU ROOM DAILY

## 2023-10-01 PROCEDURE — 70551 MRI BRAIN STEM W/O DYE: CPT | Mod: MG

## 2023-10-01 RX ORDER — ATORVASTATIN CALCIUM 40 MG/1
40 TABLET, FILM COATED ORAL DAILY
Status: DISCONTINUED | OUTPATIENT
Start: 2023-10-01 | End: 2023-10-01

## 2023-10-01 RX ORDER — LEVOTHYROXINE SODIUM 50 UG/1
50 TABLET ORAL DAILY
Status: DISCONTINUED | OUTPATIENT
Start: 2023-10-01 | End: 2023-10-05 | Stop reason: HOSPADM

## 2023-10-01 RX ORDER — PREDNISONE 20 MG/1
20 TABLET ORAL ONCE
Status: COMPLETED | OUTPATIENT
Start: 2023-10-01 | End: 2023-10-01

## 2023-10-01 RX ORDER — OXYCODONE AND ACETAMINOPHEN 5; 325 MG/1; MG/1
1 TABLET ORAL EVERY 6 HOURS PRN
Qty: 12 TABLET | Refills: 0 | Status: SHIPPED | OUTPATIENT
Start: 2023-10-01 | End: 2023-10-04

## 2023-10-01 RX ORDER — NITROGLYCERIN 0.4 MG/1
0.4 TABLET SUBLINGUAL EVERY 5 MIN PRN
Status: DISCONTINUED | OUTPATIENT
Start: 2023-10-01 | End: 2023-10-05 | Stop reason: HOSPADM

## 2023-10-01 RX ORDER — ATORVASTATIN CALCIUM 40 MG/1
40 TABLET, FILM COATED ORAL NIGHTLY
Status: DISCONTINUED | OUTPATIENT
Start: 2023-10-01 | End: 2023-10-05 | Stop reason: HOSPADM

## 2023-10-01 RX ORDER — PANTOPRAZOLE SODIUM 20 MG/1
20 TABLET, DELAYED RELEASE ORAL DAILY
Status: DISCONTINUED | OUTPATIENT
Start: 2023-10-01 | End: 2023-10-05 | Stop reason: HOSPADM

## 2023-10-01 RX ORDER — OXYCODONE AND ACETAMINOPHEN 5; 325 MG/1; MG/1
1 TABLET ORAL ONCE
Status: COMPLETED | OUTPATIENT
Start: 2023-10-01 | End: 2023-10-01

## 2023-10-01 RX ORDER — PREDNISONE 10 MG/1
20 TABLET ORAL DAILY
Qty: 8 TABLET | Refills: 0 | Status: SHIPPED | OUTPATIENT
Start: 2023-10-01 | End: 2023-10-05

## 2023-10-01 RX ORDER — AMITRIPTYLINE HYDROCHLORIDE 25 MG/1
75 TABLET, FILM COATED ORAL NIGHTLY
Status: DISCONTINUED | OUTPATIENT
Start: 2023-10-01 | End: 2023-10-05 | Stop reason: HOSPADM

## 2023-10-01 RX ORDER — METFORMIN HYDROCHLORIDE 500 MG/1
500 TABLET ORAL
Status: DISCONTINUED | OUTPATIENT
Start: 2023-10-01 | End: 2023-10-05 | Stop reason: HOSPADM

## 2023-10-01 RX ORDER — LURASIDONE HYDROCHLORIDE 40 MG/1
40 TABLET, FILM COATED ORAL DAILY
Status: DISCONTINUED | OUTPATIENT
Start: 2023-10-01 | End: 2023-10-05 | Stop reason: HOSPADM

## 2023-10-01 RX ORDER — CYCLOBENZAPRINE HCL 10 MG
5 TABLET ORAL NIGHTLY
Status: DISCONTINUED | OUTPATIENT
Start: 2023-10-01 | End: 2023-10-05 | Stop reason: HOSPADM

## 2023-10-01 RX ORDER — HYDROXYCHLOROQUINE SULFATE 200 MG/1
200 TABLET, FILM COATED ORAL DAILY
Status: DISCONTINUED | OUTPATIENT
Start: 2023-10-01 | End: 2023-10-05 | Stop reason: HOSPADM

## 2023-10-01 RX ORDER — IPRATROPIUM BROMIDE AND ALBUTEROL SULFATE 2.5; .5 MG/3ML; MG/3ML
3 SOLUTION RESPIRATORY (INHALATION) EVERY 6 HOURS PRN
Status: DISCONTINUED | OUTPATIENT
Start: 2023-10-01 | End: 2023-10-05 | Stop reason: HOSPADM

## 2023-10-01 RX ORDER — BISMUTH SUBSALICYLATE 262 MG
1 TABLET,CHEWABLE ORAL DAILY
Status: DISCONTINUED | OUTPATIENT
Start: 2023-10-01 | End: 2023-10-05 | Stop reason: HOSPADM

## 2023-10-01 RX ORDER — PILOCARPINE HYDROCHLORIDE 5 MG/1
5 TABLET, FILM COATED ORAL 4 TIMES DAILY
Status: DISCONTINUED | OUTPATIENT
Start: 2023-10-01 | End: 2023-10-05 | Stop reason: HOSPADM

## 2023-10-01 RX ORDER — ASCORBIC ACID 500 MG
500 TABLET ORAL 2 TIMES DAILY
Status: DISCONTINUED | OUTPATIENT
Start: 2023-10-01 | End: 2023-10-05 | Stop reason: HOSPADM

## 2023-10-01 RX ORDER — ASPIRIN 81 MG/1
81 TABLET ORAL DAILY
Status: DISCONTINUED | OUTPATIENT
Start: 2023-10-01 | End: 2023-10-05 | Stop reason: HOSPADM

## 2023-10-01 RX ORDER — BUSPIRONE HYDROCHLORIDE 10 MG/1
10 TABLET ORAL 2 TIMES DAILY
Status: DISCONTINUED | OUTPATIENT
Start: 2023-10-01 | End: 2023-10-05 | Stop reason: HOSPADM

## 2023-10-01 RX ORDER — METOPROLOL TARTRATE 100 MG/1
100 TABLET ORAL 2 TIMES DAILY
Status: DISCONTINUED | OUTPATIENT
Start: 2023-10-01 | End: 2023-10-05 | Stop reason: HOSPADM

## 2023-10-01 RX ORDER — HYDROXYZINE HYDROCHLORIDE 10 MG/1
25 TABLET, FILM COATED ORAL EVERY 6 HOURS PRN
Status: DISCONTINUED | OUTPATIENT
Start: 2023-10-01 | End: 2023-10-01

## 2023-10-01 RX ORDER — HYDROXYZINE PAMOATE 25 MG/1
25 CAPSULE ORAL EVERY 6 HOURS PRN
Status: DISCONTINUED | OUTPATIENT
Start: 2023-10-01 | End: 2023-10-05 | Stop reason: HOSPADM

## 2023-10-01 RX ORDER — COLCHICINE 0.6 MG/1
0.6 TABLET ORAL DAILY
Qty: 14 TABLET | Refills: 0 | Status: SHIPPED | OUTPATIENT
Start: 2023-10-01 | End: 2023-10-15

## 2023-10-01 RX ORDER — COLCHICINE 0.6 MG/1
1.2 TABLET ORAL ONCE
Status: COMPLETED | OUTPATIENT
Start: 2023-10-01 | End: 2023-10-01

## 2023-10-01 RX ORDER — ASPIRIN 325 MG
325 TABLET ORAL ONCE
Status: COMPLETED | OUTPATIENT
Start: 2023-10-01 | End: 2023-10-01

## 2023-10-01 RX ORDER — LORAZEPAM 2 MG/ML
1 INJECTION INTRAMUSCULAR ONCE
Status: COMPLETED | OUTPATIENT
Start: 2023-10-01 | End: 2023-10-01

## 2023-10-01 RX ORDER — POLYETHYLENE GLYCOL 3350 17 G/17G
17 POWDER, FOR SOLUTION ORAL DAILY
Status: DISCONTINUED | OUTPATIENT
Start: 2023-10-01 | End: 2023-10-05 | Stop reason: HOSPADM

## 2023-10-01 RX ORDER — TRAZODONE HYDROCHLORIDE 100 MG/1
300 TABLET ORAL NIGHTLY
Status: DISCONTINUED | OUTPATIENT
Start: 2023-10-01 | End: 2023-10-05 | Stop reason: HOSPADM

## 2023-10-01 RX ADMIN — OXYCODONE HYDROCHLORIDE AND ACETAMINOPHEN 500 MG: 500 TABLET ORAL at 21:05

## 2023-10-01 RX ADMIN — LORAZEPAM 1 MG: 2 INJECTION INTRAMUSCULAR; INTRAVENOUS at 12:34

## 2023-10-01 RX ADMIN — ASPIRIN 325 MG: 325 TABLET ORAL at 12:00

## 2023-10-01 RX ADMIN — APIXABAN 5 MG: 5 TABLET, FILM COATED ORAL at 21:04

## 2023-10-01 RX ADMIN — BUSPIRONE HYDROCHLORIDE 10 MG: 10 TABLET ORAL at 21:04

## 2023-10-01 RX ADMIN — OXYCODONE HYDROCHLORIDE AND ACETAMINOPHEN 1 TABLET: 5; 325 TABLET ORAL at 10:19

## 2023-10-01 RX ADMIN — PREDNISONE 20 MG: 20 TABLET ORAL at 10:19

## 2023-10-01 RX ADMIN — PILOCARPINE HYDROCHLORIDE 5 MG: 5 TABLET, FILM COATED ORAL at 21:05

## 2023-10-01 RX ADMIN — COLCHICINE 1.2 MG: 0.6 TABLET, FILM COATED ORAL at 10:19

## 2023-10-01 RX ADMIN — PANTOPRAZOLE SODIUM 20 MG: 20 TABLET, DELAYED RELEASE ORAL at 17:56

## 2023-10-01 RX ADMIN — Medication: at 20:00

## 2023-10-01 RX ADMIN — AMITRIPTYLINE HYDROCHLORIDE 75 MG: 25 TABLET, FILM COATED ORAL at 21:05

## 2023-10-01 RX ADMIN — ATORVASTATIN CALCIUM 40 MG: 40 TABLET, FILM COATED ORAL at 21:04

## 2023-10-01 RX ADMIN — PILOCARPINE HYDROCHLORIDE 5 MG: 5 TABLET, FILM COATED ORAL at 18:23

## 2023-10-01 SDOH — ECONOMIC STABILITY: INCOME INSECURITY: IN THE LAST 12 MONTHS, WAS THERE A TIME WHEN YOU WERE NOT ABLE TO PAY THE MORTGAGE OR RENT ON TIME?: NO

## 2023-10-01 SDOH — HEALTH STABILITY: PHYSICAL HEALTH: ON AVERAGE, HOW MANY DAYS PER WEEK DO YOU ENGAGE IN MODERATE TO STRENUOUS EXERCISE (LIKE A BRISK WALK)?: 1 DAY

## 2023-10-01 SDOH — ECONOMIC STABILITY: INCOME INSECURITY: HOW HARD IS IT FOR YOU TO PAY FOR THE VERY BASICS LIKE FOOD, HOUSING, MEDICAL CARE, AND HEATING?: NOT HARD AT ALL

## 2023-10-01 SDOH — SOCIAL STABILITY: SOCIAL INSECURITY: WITHIN THE LAST YEAR, HAVE YOU BEEN HUMILIATED OR EMOTIONALLY ABUSED IN OTHER WAYS BY YOUR PARTNER OR EX-PARTNER?: NO

## 2023-10-01 SDOH — SOCIAL STABILITY: SOCIAL NETWORK: ARE YOU MARRIED, WIDOWED, DIVORCED, SEPARATED, NEVER MARRIED, OR LIVING WITH A PARTNER?: MARRIED

## 2023-10-01 SDOH — SOCIAL STABILITY: SOCIAL INSECURITY
WITHIN THE LAST YEAR, HAVE TO BEEN RAPED OR FORCED TO HAVE ANY KIND OF SEXUAL ACTIVITY BY YOUR PARTNER OR EX-PARTNER?: NO

## 2023-10-01 SDOH — ECONOMIC STABILITY: INCOME INSECURITY: IN THE PAST 12 MONTHS, HAS THE ELECTRIC, GAS, OIL, OR WATER COMPANY THREATENED TO SHUT OFF SERVICE IN YOUR HOME?: NO

## 2023-10-01 SDOH — SOCIAL STABILITY: SOCIAL NETWORK
DO YOU BELONG TO ANY CLUBS OR ORGANIZATIONS SUCH AS CHURCH GROUPS UNIONS, FRATERNAL OR ATHLETIC GROUPS, OR SCHOOL GROUPS?: YES

## 2023-10-01 SDOH — SOCIAL STABILITY: SOCIAL INSECURITY: DO YOU FEEL ANYONE HAS EXPLOITED OR TAKEN ADVANTAGE OF YOU FINANCIALLY OR OF YOUR PERSONAL PROPERTY?: NO

## 2023-10-01 SDOH — SOCIAL STABILITY: SOCIAL INSECURITY: WITHIN THE LAST YEAR, HAVE YOU BEEN AFRAID OF YOUR PARTNER OR EX-PARTNER?: NO

## 2023-10-01 SDOH — HEALTH STABILITY: PHYSICAL HEALTH: ON AVERAGE, HOW MANY MINUTES DO YOU ENGAGE IN EXERCISE AT THIS LEVEL?: 20 MIN

## 2023-10-01 SDOH — SOCIAL STABILITY: SOCIAL INSECURITY: DOES ANYONE TRY TO KEEP YOU FROM HAVING/CONTACTING OTHER FRIENDS OR DOING THINGS OUTSIDE YOUR HOME?: NO

## 2023-10-01 SDOH — ECONOMIC STABILITY: FOOD INSECURITY: WITHIN THE PAST 12 MONTHS, YOU WORRIED THAT YOUR FOOD WOULD RUN OUT BEFORE YOU GOT MONEY TO BUY MORE.: NEVER TRUE

## 2023-10-01 SDOH — SOCIAL STABILITY: SOCIAL INSECURITY
WITHIN THE LAST YEAR, HAVE YOU BEEN KICKED, HIT, SLAPPED, OR OTHERWISE PHYSICALLY HURT BY YOUR PARTNER OR EX-PARTNER?: NO

## 2023-10-01 SDOH — ECONOMIC STABILITY: HOUSING INSECURITY
IN THE LAST 12 MONTHS, WAS THERE A TIME WHEN YOU DID NOT HAVE A STEADY PLACE TO SLEEP OR SLEPT IN A SHELTER (INCLUDING NOW)?: NO

## 2023-10-01 SDOH — HEALTH STABILITY: MENTAL HEALTH
STRESS IS WHEN SOMEONE FEELS TENSE, NERVOUS, ANXIOUS, OR CAN'T SLEEP AT NIGHT BECAUSE THEIR MIND IS TROUBLED. HOW STRESSED ARE YOU?: NOT AT ALL

## 2023-10-01 SDOH — SOCIAL STABILITY: SOCIAL NETWORK: HOW OFTEN DO YOU ATTENT MEETINGS OF THE CLUB OR ORGANIZATION YOU BELONG TO?: NEVER

## 2023-10-01 SDOH — SOCIAL STABILITY: SOCIAL INSECURITY: HAVE YOU HAD THOUGHTS OF HARMING ANYONE ELSE?: NO

## 2023-10-01 SDOH — SOCIAL STABILITY: SOCIAL INSECURITY: HAS ANYONE EVER THREATENED TO HURT YOUR FAMILY OR YOUR PETS?: NO

## 2023-10-01 SDOH — SOCIAL STABILITY: SOCIAL NETWORK: HOW OFTEN DO YOU GET TOGETHER WITH FRIENDS OR RELATIVES?: ONCE A WEEK

## 2023-10-01 SDOH — SOCIAL STABILITY: SOCIAL INSECURITY: ABUSE: ADULT

## 2023-10-01 SDOH — ECONOMIC STABILITY: TRANSPORTATION INSECURITY
IN THE PAST 12 MONTHS, HAS LACK OF TRANSPORTATION KEPT YOU FROM MEETINGS, WORK, OR FROM GETTING THINGS NEEDED FOR DAILY LIVING?: NO

## 2023-10-01 SDOH — ECONOMIC STABILITY: FOOD INSECURITY: WITHIN THE PAST 12 MONTHS, THE FOOD YOU BOUGHT JUST DIDN'T LAST AND YOU DIDN'T HAVE MONEY TO GET MORE.: NEVER TRUE

## 2023-10-01 SDOH — SOCIAL STABILITY: SOCIAL INSECURITY: ARE YOU OR HAVE YOU BEEN THREATENED OR ABUSED PHYSICALLY, EMOTIONALLY, OR SEXUALLY BY ANYONE?: NO

## 2023-10-01 SDOH — SOCIAL STABILITY: SOCIAL NETWORK: HOW OFTEN DO YOU ATTEND CHURCH OR RELIGIOUS SERVICES?: NEVER

## 2023-10-01 SDOH — SOCIAL STABILITY: SOCIAL INSECURITY: DO YOU FEEL UNSAFE GOING BACK TO THE PLACE WHERE YOU ARE LIVING?: NO

## 2023-10-01 SDOH — ECONOMIC STABILITY: HOUSING INSECURITY: IN THE LAST 12 MONTHS, HOW MANY PLACES HAVE YOU LIVED?: 1

## 2023-10-01 SDOH — ECONOMIC STABILITY: TRANSPORTATION INSECURITY
IN THE PAST 12 MONTHS, HAS THE LACK OF TRANSPORTATION KEPT YOU FROM MEDICAL APPOINTMENTS OR FROM GETTING MEDICATIONS?: NO

## 2023-10-01 SDOH — SOCIAL STABILITY: SOCIAL NETWORK: IN A TYPICAL WEEK, HOW MANY TIMES DO YOU TALK ON THE PHONE WITH FAMILY, FRIENDS, OR NEIGHBORS?: ONCE A WEEK

## 2023-10-01 SDOH — SOCIAL STABILITY: SOCIAL INSECURITY: ARE THERE ANY APPARENT SIGNS OF INJURIES/BEHAVIORS THAT COULD BE RELATED TO ABUSE/NEGLECT?: NO

## 2023-10-01 ASSESSMENT — ACTIVITIES OF DAILY LIVING (ADL)
TOILETING: INDEPENDENT
HEARING - LEFT EAR: FUNCTIONAL
DRESSING YOURSELF: INDEPENDENT
BATHING: INDEPENDENT
ASSISTIVE_DEVICE: EYEGLASSES
ADEQUATE_TO_COMPLETE_ADL: YES
WALKS IN HOME: INDEPENDENT
HEARING - RIGHT EAR: FUNCTIONAL
JUDGMENT_ADEQUATE_SAFELY_COMPLETE_DAILY_ACTIVITIES: UNABLE TO ASSESS
GROOMING: INDEPENDENT
FEEDING YOURSELF: INDEPENDENT
PATIENT'S MEMORY ADEQUATE TO SAFELY COMPLETE DAILY ACTIVITIES?: NO

## 2023-10-01 ASSESSMENT — COGNITIVE AND FUNCTIONAL STATUS - GENERAL
MOVING FROM LYING ON BACK TO SITTING ON SIDE OF FLAT BED WITH BEDRAILS: A LITTLE
EATING MEALS: A LITTLE
STANDING UP FROM CHAIR USING ARMS: A LITTLE
PERSONAL GROOMING: A LITTLE
WALKING IN HOSPITAL ROOM: A LOT
DAILY ACTIVITIY SCORE: 18
MOVING TO AND FROM BED TO CHAIR: A LITTLE
TURNING FROM BACK TO SIDE WHILE IN FLAT BAD: A LITTLE
MOBILITY SCORE: 16
CLIMB 3 TO 5 STEPS WITH RAILING: A LOT
PATIENT BASELINE BEDBOUND: NO
DRESSING REGULAR UPPER BODY CLOTHING: A LITTLE
DRESSING REGULAR LOWER BODY CLOTHING: A LITTLE
TOILETING: A LITTLE
HELP NEEDED FOR BATHING: A LITTLE

## 2023-10-01 ASSESSMENT — PAIN - FUNCTIONAL ASSESSMENT: PAIN_FUNCTIONAL_ASSESSMENT: 0-10

## 2023-10-01 ASSESSMENT — PATIENT HEALTH QUESTIONNAIRE - PHQ9
SUM OF ALL RESPONSES TO PHQ9 QUESTIONS 1 & 2: 0
1. LITTLE INTEREST OR PLEASURE IN DOING THINGS: NOT AT ALL
2. FEELING DOWN, DEPRESSED OR HOPELESS: NOT AT ALL

## 2023-10-01 ASSESSMENT — LIFESTYLE VARIABLES
HOW OFTEN DO YOU HAVE A DRINK CONTAINING ALCOHOL: NEVER
AUDIT-C TOTAL SCORE: 0
HOW MANY STANDARD DRINKS CONTAINING ALCOHOL DO YOU HAVE ON A TYPICAL DAY: PATIENT DOES NOT DRINK
SKIP TO QUESTIONS 9-10: 1
AUDIT-C TOTAL SCORE: 0
HOW OFTEN DO YOU HAVE 6 OR MORE DRINKS ON ONE OCCASION: NEVER

## 2023-10-01 ASSESSMENT — COLUMBIA-SUICIDE SEVERITY RATING SCALE - C-SSRS
2. HAVE YOU ACTUALLY HAD ANY THOUGHTS OF KILLING YOURSELF?: NO
6. HAVE YOU EVER DONE ANYTHING, STARTED TO DO ANYTHING, OR PREPARED TO DO ANYTHING TO END YOUR LIFE?: NO
1. IN THE PAST MONTH, HAVE YOU WISHED YOU WERE DEAD OR WISHED YOU COULD GO TO SLEEP AND NOT WAKE UP?: NO

## 2023-10-01 ASSESSMENT — PAIN SCALES - GENERAL: PAINLEVEL_OUTOF10: 9

## 2023-10-01 NOTE — CARE PLAN
The patient's goals for the shift include  decrease leg pain    The clinical goals for the shift include decrease pain
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
## 2023-10-01 NOTE — H&P
History Of Present Illness  Eugenio Bliss is a 67 y.o. male presenting with left leg pain x 3 days which feels like gout.  He is unable to move Left leg..  Takes Eliquis     Past Medical History  He has a past medical history of Abdominal hernia (05/16/2023), Achilles tendinitis, right leg, Acute frontal sinusitis, unspecified (02/03/2020), Allergic rhinitis (05/16/2023), Body mass index (BMI) 39.0-39.9, adult (05/25/2021), CHF (congestive heart failure) (CMS/Formerly [facility]), Chondrocostal junction syndrome (tietze) (07/22/2013), Contact with and (suspected) exposure to covid-19, COPD (chronic obstructive pulmonary disease) (CMS/Formerly [facility]), Deficiency of other specified B group vitamins (12/03/2019), Diabetes mellitus (CMS/Formerly [facility]), Dry eye syndrome of unspecified lacrimal gland (12/29/2014), Effusion, right ankle (06/13/2018), Encounter for screening for malignant neoplasm of colon (05/19/2016), Encounter for screening for malignant neoplasm of prostate (04/24/2019), Gallbladder attack (05/16/2023), Hypertension (05/21/2023), Inguinal hernia (05/21/2023), Obesity, unspecified (05/04/2022), Other conditions influencing health status (04/22/2013), Other conditions influencing health status (08/14/2019), Other conditions influencing health status (04/22/2013), Pain in right ankle and joints of right foot, Pain in unspecified elbow (07/10/2017), Personal history of diseases of the blood and blood-forming organs and certain disorders involving the immune mechanism (12/03/2019), Personal history of diseases of the blood and blood-forming organs and certain disorders involving the immune mechanism (12/03/2019), Personal history of other (healed) physical injury and trauma (04/16/2019), Personal history of other diseases of the digestive system (12/03/2019), Personal history of other diseases of the digestive system (07/08/2022), Personal history of other diseases of the respiratory system, Personal history of other diseases of urinary system  (12/03/2019), Personal history of other endocrine, nutritional and metabolic disease (04/22/2013), Personal history of other endocrine, nutritional and metabolic disease (07/29/2016), Personal history of other specified conditions (10/17/2019), Personal history of other specified conditions, Personal history of other specified conditions (09/06/2013), Personal history of other specified conditions (04/22/2013), Personal history of pneumonia (recurrent) (09/04/2020), Pleurodynia (06/05/2020), Pure hypercholesterolemia, unspecified (11/08/2013), Right knee pain (05/21/2023), Sjogren syndrome, unspecified (CMS/HCC), Snoring (05/21/2023), Stiffness of right ankle, not elsewhere classified (06/13/2018), 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    Gout  COPD, never smoked  CRF on 2L qHS  HTN  Hypothyroid  Chronic pain  DM II (8.3)  GERD  SLE  Depression  HPL  Diverticulosis  Sjogrens  MAURICE, BIPAP.    Last admitted 5/23 by Adonis    allopurinol 100 mg Tablet Directions: 1 tablet oral daily By: Teto Lamb MD Last Dose Given: 100 MG 05/10/2023 09:50 AM (Active)   apixaban (Eliquis) 5 mg Tablet Directions: 5 mg oral twice a day By: Teto Lamb MD Last Dose Given: 5 MG 05/10/2023 09:50 AM (Active)   aspirin 81 mg tablet,chewable Directions: 2 tablet oral daily By: Teto Lamb MD Last Dose Given: 162 MG 05/09/2023 08:07 AM (Active)   busPIRone 10 mg Tablet Directions: 1 tablet oral twice a day By: Teto Lamb MD Last Dose Given: 10 MG 05/10/2023 09:50 AM (Active)   doxycycline hyclate 100 mg Capsule Directions: 1 capsule oral every twelve hours Extended Instructions: GIVE 1 HOUR BEFORE OR 2 HOURS AFTER DAIRY PRODUCTS, ANTACIDS, MAGNESIUM, CALCIUM, ZINC OR IRON CONTAINING PRODUCTS. By: Teto Lamb MD Last Dose Given: 100 MG 05/10/2023 11:55 AM (Active)   hydroxychloroquine 200 mg Tablet  "Directions: 1 tablet oral daily By: Teto Lamb MD Last Dose Given: 200 MG 05/10/2023 09:50 AM (Active)   ipratropium-albuterol 0.5 mg-3 mg (2.5 mg base)/3 mL Solution for Nebulization Directions: 3 mL by inhalation four times daily By: Teto Lamb MD Last Dose Given: 3 ML 05/10/2023 12:16 PM (Active)   levothyroxine (Synthroid) 50 mcg Tablet Directions: 1 tablet oral daily before breakfast By: Teto Lamb MD Last Dose Given: 50 MCG 05/10/2023 05:47 AM (Active)   losartan 50 mg Tablet Directions: 1.5 tablet oral daily By: Teto Lamb MD Last Dose Given: 75 MG 05/10/2023 09:50 AM (Active)   metoprolol tartrate 100 mg Tablet Directions: 1 tablet oral twice a day By: Teto Lamb MD Last Dose Given: 100 MG 05/10/2023 09:50 AM (Active)   nitroglycerin (Nitrostat) 0.4 mg Tablet, Sublingual Directions: 1 tablet sublingual daily Chest Pain Extended Instructions: MAY REPEAT UP TO 3 DOSES By: Teto Lamb MD (Active)   \"PA/lateral chest x-ray. Diagnosis: J18.9. Results to Dr. Cali. Please obtain 1-2 days prior to appointment Dr. Cali.\"   pantoprazole 20 mg tablet,delayed release (DR/EC) Directions: 1 tablet oral daily By: Teto Lamb MD Last Dose Given: 20 MG 05/10/2023 09:50 AM (Active)   pilocarpine HCl 5 mg Tablet Directions: 1 tablet oral four times daily By: Teto Lamb MD Last Dose Given: 5 MG 05/10/2023 09:50 AM (Active)   traZODone 100 mg Tablet Directions: 3 tablet oral daily at bedtime By: Teto Lamb MD Last Dose Given: 300 MG 05/09/2023 09:29 PM (Active)  Active PRN   acetaminophen 325 mg Tablet Directions: 2 tablet oral every four hours PRN Mild pain and/or fever >38.5 Extended Instructions: THIS ORDER IS SUPERSEDED BY ANY OTHER MEDICATION ORDER FOR MILD PAIN (1-3). MAY BE USED FOR A HIGHER PAIN SCORE IF PATIENT REQUESTS By: Teto Lamb MD Last Dose Given: 650 MG 05/07/2023 08:19 PM (Active)   benzonatate 100 mg Capsule Directions: 1 capsule oral three times a " day PRN Cough By: Teto Lamb MD (Active)   hydrOXYzine pamoate 25 mg Capsule Directions: 1 capsule oral daily at bedtime PRN Insomnia By: Teto Lamb MD Last Dose Given: 25 MG 05/09/2023 09:29 PM (Active)   magnesium hydroxide (Milk of Magnesia) 400 mg/5 mL Suspension Directions: 30 mL oral daily at bedtime PRN Constipation Extended Instructions: FOLLOW DUMPS GUIDANCE (AVAILABLE VIA [facility] NURSING LINKS) WHEN MULTIPLE MEDS ARE ORDERED PRN CONSTIPATION. By: Teto Lamb MD (Active)  Surgical History  He has a past surgical history that includes Ankle surgery (04/17/2013); Knee surgery (04/17/2013); and Hernia repair (04/17/2013).      Bilateral knee arthroscopic partial meniscectomy. Multiple bilateral foot surgeries (x8).     Social History  He reports that he has never smoked. He has never used smokeless tobacco. He reports that he does not currently use alcohol. He reports that he does not use drugs.    Family History      Mother diabetic high blood pressure father diabetic.           Allergies  Ciprofloxacin, Levofloxacin, and Penicillins    Review of Systems     Physical Exam     Last Recorded Vitals  /66   Pulse 70   Temp 36.9 °C (98.4 °F) (Oral)   Resp 15   Wt 94.9 kg (209 lb 3.5 oz)   SpO2 96%     Alert appropriate poor hygiene  Lungs clear  Cor RRR  Dense LLE weakness    Relevant Results  Results for orders placed or performed during the hospital encounter of 10/01/23 (from the past 24 hour(s))   CBC   Result Value Ref Range    WBC 8.0 4.4 - 11.3 x10*3/uL    nRBC 0.0 0.0 - 0.0 /100 WBCs    RBC 5.34 4.50 - 5.90 x10*6/uL    Hemoglobin 16.1 13.5 - 17.5 g/dL    Hematocrit 46.6 41.0 - 52.0 %    MCV 87 80 - 100 fL    MCH 30.1 26.0 - 34.0 pg    MCHC 34.5 32.0 - 36.0 g/dL    RDW 14.3 11.5 - 14.5 %    Platelets 153 150 - 450 x10*3/uL    MPV 9.2 7.5 - 11.5 fL   Basic Metabolic Panel   Result Value Ref Range    Glucose 104 (H) 65 - 99 mg/dL    Sodium 135 133 - 145 mmol/L    Potassium 4.0 3.4  - 5.1 mmol/L    Chloride 97 97 - 107 mmol/L    Bicarbonate 21 (L) 24 - 31 mmol/L    Urea Nitrogen 27 (H) 8 - 25 mg/dL    Creatinine 1.10 0.40 - 1.60 mg/dL    eGFR 73 >60 mL/min/1.73m*2    Calcium 9.9 8.5 - 10.4 mg/dL    Anion Gap 17 <=19 mmol/L   Hepatic Function Panel   Result Value Ref Range    AST 18 5 - 40 U/L    ALT 12 5 - 40 U/L    Alkaline Phosphatase 87 35 - 125 U/L    Bilirubin, Total 2.8 (H) 0.1 - 1.2 mg/dL    Bilirubin, Direct 0.5 (H) 0.0 - 0.2 mg/dL    Total Protein 8.1 (H) 5.9 - 7.9 g/dL    Albumin 4.2 3.5 - 5.0 g/dL   Troponin T   Result Value Ref Range    Troponin T, High Sensitivity 30 (H) <=15 ng/L          XR chest 1 view    Result Date: 10/1/2023  Interpreted By:  Arnie Zamarripa, STUDY: XR CHEST 1 VIEW; 10/1/2023 11:24 am   INDICATION: Signs/Symptoms:left leg weakness, stroke.   COMPARISON: 05/16/2023   ACCESSION NUMBER(S): [id number]   ORDERING CLINICIAN: KASSY DOOLEY   TECHNIQUE: 1 view of the chest was performed.   FINDINGS: The lungs are adequately inflated. No acute consolidation. No pleural effusion. No pneumothorax.  The cardiomediastinal silhouette is within normal limits.       No acute cardiopulmonary disease.   Signed by: Arnie Zamarripa 10/1/2023 12:13 PM Dictation workstation:   [id number]    CT head wo IV contrast    Result Date: 10/1/2023  Interpreted By:  Yaron Thomas, STUDY: CT HEAD WO IV CONTRAST; 10/1/2023 10:53 am   INDICATION: anticoagulated head injury. Left knee and left leg weakness   COMPARISON: No comparison exams available.   ACCESSION NUMBER(S): [id number]   ORDERING CLINICIAN: KASSY DOOLEY   TECHNIQUE: CT axial images through the Brain were obtained without contrast.   FINDINGS: Acute ischemic change: None.   Hemorrhage: No evidence of acute intracranial hemorrhage.   Mass Effect / Mass Lesion: No significant mass effect. There is no evidence of an intracranial mass or extraaxial fluid collection.   Chronic ischemic change: Patchy foci of  low attenuation  coefficient are present within white matter which is a nonspecific finding but likely represents moderate microvascular ischemia.   Parenchyma: There is moderate generalized volume loss for age. The brain parenchyma is otherwise within normal limits for age.   Ventricles: Ventriculomegaly concordant with the degree of parenchymal volume loss.   Other: The visualized calvarium, skull base, orbits and extracranial soft tissues are normal.       No acute intracranial process   MACRO: none   Signed by: Yaron Thomas 10/1/2023 11:15 AM Dictation workstation:   [id number]        Assessment/Plan   Principal Problem:    Stroke of unknown cause (CMS/HCC)    Patient Active Problem List   Diagnosis    Anxiety disorder    Arteriosclerosis of coronary artery    Arthritis of knee, right    Depression, major, recurrent (CMS/HCC)    Diabetes mellitus (CMS/HCC)    Esophageal reflux    Pneumonia of left lung due to infectious organism    Hospital discharge follow-up    Afib (CMS/HCC)    Hypercholesteremia    Hypothyroidism    Mild obstructive sleep apnea    NAFLD (nonalcoholic fatty liver disease)    Primary osteoarthritis of both knees    Rheumatoid arthritis (CMS/HCC)    Sjogrens syndrome (CMS/HCC)    Systemic lupus erythematosus (CMS/HCC)    Stroke of unknown cause (CMS/HCC)        Admit to SDU  MRI Brain   Neurology Consult    Melo Perea MD

## 2023-10-02 ENCOUNTER — APPOINTMENT (OUTPATIENT)
Dept: RADIOLOGY | Facility: HOSPITAL | Age: 68
DRG: 552 | End: 2023-10-02
Payer: MEDICARE

## 2023-10-02 LAB
ERYTHROCYTE [SEDIMENTATION RATE] IN BLOOD BY WESTERGREN METHOD: 43 MM/H (ref 0–20)
GLUCOSE BLD MANUAL STRIP-MCNC: 117 MG/DL (ref 74–99)
GLUCOSE BLD MANUAL STRIP-MCNC: 121 MG/DL (ref 74–99)
GLUCOSE BLD MANUAL STRIP-MCNC: 165 MG/DL (ref 74–99)
GLUCOSE BLD MANUAL STRIP-MCNC: 186 MG/DL (ref 74–99)
URATE SERPL-MCNC: 10.4 MG/DL (ref 3.6–7.7)

## 2023-10-02 PROCEDURE — 2500000004 HC RX 250 GENERAL PHARMACY W/ HCPCS (ALT 636 FOR OP/ED): Performed by: INTERNAL MEDICINE

## 2023-10-02 PROCEDURE — 97165 OT EVAL LOW COMPLEX 30 MIN: CPT | Mod: GO

## 2023-10-02 PROCEDURE — 36415 COLL VENOUS BLD VENIPUNCTURE: CPT | Performed by: INTERNAL MEDICINE

## 2023-10-02 PROCEDURE — 97110 THERAPEUTIC EXERCISES: CPT | Mod: GP

## 2023-10-02 PROCEDURE — 2060000001 HC INTERMEDIATE ICU ROOM DAILY

## 2023-10-02 PROCEDURE — G1004 CDSM NDSC: HCPCS

## 2023-10-02 PROCEDURE — 2500000002 HC RX 250 W HCPCS SELF ADMINISTERED DRUGS (ALT 637 FOR MEDICARE OP, ALT 636 FOR OP/ED): Performed by: INTERNAL MEDICINE

## 2023-10-02 PROCEDURE — 85652 RBC SED RATE AUTOMATED: CPT | Performed by: INTERNAL MEDICINE

## 2023-10-02 PROCEDURE — 97161 PT EVAL LOW COMPLEX 20 MIN: CPT | Mod: GP

## 2023-10-02 PROCEDURE — 2500000001 HC RX 250 WO HCPCS SELF ADMINISTERED DRUGS (ALT 637 FOR MEDICARE OP): Performed by: INTERNAL MEDICINE

## 2023-10-02 PROCEDURE — 84550 ASSAY OF BLOOD/URIC ACID: CPT | Performed by: INTERNAL MEDICINE

## 2023-10-02 PROCEDURE — 82947 ASSAY GLUCOSE BLOOD QUANT: CPT

## 2023-10-02 PROCEDURE — 1100000001 HC PRIVATE ROOM DAILY

## 2023-10-02 PROCEDURE — 97535 SELF CARE MNGMENT TRAINING: CPT | Mod: GO

## 2023-10-02 RX ORDER — LORAZEPAM 2 MG/ML
2 INJECTION INTRAMUSCULAR ONCE
Status: DISCONTINUED | OUTPATIENT
Start: 2023-10-02 | End: 2023-10-02

## 2023-10-02 RX ORDER — COLCHICINE 0.6 MG/1
0.6 TABLET ORAL
Status: DISCONTINUED | OUTPATIENT
Start: 2023-10-02 | End: 2023-10-03

## 2023-10-02 RX ORDER — OXYCODONE HYDROCHLORIDE 5 MG/1
5 TABLET ORAL EVERY 6 HOURS PRN
Status: DISCONTINUED | OUTPATIENT
Start: 2023-10-02 | End: 2023-10-02

## 2023-10-02 RX ORDER — KETOROLAC TROMETHAMINE 30 MG/ML
15 INJECTION, SOLUTION INTRAMUSCULAR; INTRAVENOUS EVERY 6 HOURS PRN
Status: DISCONTINUED | OUTPATIENT
Start: 2023-10-02 | End: 2023-10-05 | Stop reason: HOSPADM

## 2023-10-02 RX ORDER — ACETAMINOPHEN 325 MG/1
325 TABLET ORAL 4 TIMES DAILY
Status: DISCONTINUED | OUTPATIENT
Start: 2023-10-02 | End: 2023-10-05 | Stop reason: HOSPADM

## 2023-10-02 RX ORDER — DEXAMETHASONE 4 MG/1
4 TABLET ORAL EVERY 8 HOURS SCHEDULED
Status: DISCONTINUED | OUTPATIENT
Start: 2023-10-02 | End: 2023-10-05 | Stop reason: HOSPADM

## 2023-10-02 RX ORDER — LORAZEPAM 2 MG/ML
2 INJECTION INTRAMUSCULAR ONCE
Status: COMPLETED | OUTPATIENT
Start: 2023-10-02 | End: 2023-10-02

## 2023-10-02 RX ADMIN — ACETAMINOPHEN 325 MG: 325 TABLET, FILM COATED ORAL at 17:44

## 2023-10-02 RX ADMIN — APIXABAN 5 MG: 5 TABLET, FILM COATED ORAL at 09:02

## 2023-10-02 RX ADMIN — KETOROLAC TROMETHAMINE 15 MG: 30 INJECTION, SOLUTION INTRAMUSCULAR at 11:39

## 2023-10-02 RX ADMIN — DEXAMETHASONE 4 MG: 4 TABLET ORAL at 14:35

## 2023-10-02 RX ADMIN — CYCLOBENZAPRINE 5 MG: 10 TABLET, FILM COATED ORAL at 21:34

## 2023-10-02 RX ADMIN — DEXAMETHASONE 4 MG: 4 TABLET ORAL at 21:59

## 2023-10-02 RX ADMIN — ACETAMINOPHEN 325 MG: 325 TABLET, FILM COATED ORAL at 21:32

## 2023-10-02 RX ADMIN — OXYCODONE HYDROCHLORIDE AND ACETAMINOPHEN 500 MG: 500 TABLET ORAL at 21:35

## 2023-10-02 RX ADMIN — METOPROLOL 100 MG: 100 TABLET ORAL at 21:34

## 2023-10-02 RX ADMIN — BUSPIRONE HYDROCHLORIDE 10 MG: 10 TABLET ORAL at 21:35

## 2023-10-02 RX ADMIN — METOPROLOL 100 MG: 100 TABLET ORAL at 09:02

## 2023-10-02 RX ADMIN — APIXABAN 5 MG: 5 TABLET, FILM COATED ORAL at 21:34

## 2023-10-02 RX ADMIN — MULTIVITAMIN TABLET 1 TABLET: TABLET at 09:02

## 2023-10-02 RX ADMIN — METFORMIN HYDROCHLORIDE 500 MG: 500 TABLET, FILM COATED ORAL at 17:44

## 2023-10-02 RX ADMIN — COLCHICINE 0.6 MG: 0.6 TABLET, FILM COATED ORAL at 14:35

## 2023-10-02 RX ADMIN — LORAZEPAM 2 MG: 2 INJECTION INTRAMUSCULAR; INTRAVENOUS at 20:30

## 2023-10-02 RX ADMIN — ATORVASTATIN CALCIUM 40 MG: 40 TABLET, FILM COATED ORAL at 21:34

## 2023-10-02 RX ADMIN — HYDROXYCHLOROQUINE SULFATE 200 MG: 200 TABLET ORAL at 09:02

## 2023-10-02 RX ADMIN — POLYETHYLENE GLYCOL 3350 17 G: 17 POWDER, FOR SOLUTION ORAL at 09:02

## 2023-10-02 RX ADMIN — PILOCARPINE HYDROCHLORIDE 5 MG: 5 TABLET, FILM COATED ORAL at 12:28

## 2023-10-02 RX ADMIN — TRAZODONE HYDROCHLORIDE 300 MG: 100 TABLET ORAL at 21:33

## 2023-10-02 RX ADMIN — LEVOTHYROXINE SODIUM 50 MCG: 0.05 TABLET ORAL at 09:02

## 2023-10-02 RX ADMIN — BUSPIRONE HYDROCHLORIDE 10 MG: 10 TABLET ORAL at 09:02

## 2023-10-02 RX ADMIN — ACETAMINOPHEN 325 MG: 325 TABLET, FILM COATED ORAL at 12:28

## 2023-10-02 RX ADMIN — PANTOPRAZOLE SODIUM 20 MG: 20 TABLET, DELAYED RELEASE ORAL at 09:02

## 2023-10-02 RX ADMIN — AMITRIPTYLINE HYDROCHLORIDE 75 MG: 25 TABLET, FILM COATED ORAL at 21:35

## 2023-10-02 RX ADMIN — ASPIRIN 81 MG: 81 TABLET, COATED ORAL at 09:02

## 2023-10-02 RX ADMIN — OXYCODONE HYDROCHLORIDE AND ACETAMINOPHEN 500 MG: 500 TABLET ORAL at 09:02

## 2023-10-02 RX ADMIN — PILOCARPINE HYDROCHLORIDE 5 MG: 5 TABLET, FILM COATED ORAL at 05:21

## 2023-10-02 RX ADMIN — METFORMIN HYDROCHLORIDE 500 MG: 500 TABLET, FILM COATED ORAL at 09:02

## 2023-10-02 RX ADMIN — LURASIDONE HYDROCHLORIDE 40 MG: 40 TABLET, FILM COATED ORAL at 10:46

## 2023-10-02 RX ADMIN — PILOCARPINE HYDROCHLORIDE 5 MG: 5 TABLET, FILM COATED ORAL at 17:44

## 2023-10-02 RX ADMIN — PILOCARPINE HYDROCHLORIDE 5 MG: 5 TABLET, FILM COATED ORAL at 21:35

## 2023-10-02 RX ADMIN — DEXAMETHASONE 4 MG: 4 TABLET ORAL at 10:47

## 2023-10-02 ASSESSMENT — COGNITIVE AND FUNCTIONAL STATUS - GENERAL
MOBILITY SCORE: 16
TURNING FROM BACK TO SIDE WHILE IN FLAT BAD: A LITTLE
DRESSING REGULAR LOWER BODY CLOTHING: A LITTLE
DAILY ACTIVITIY SCORE: 19
DRESSING REGULAR UPPER BODY CLOTHING: A LITTLE
TOILETING: A LITTLE
MOBILITY SCORE: 19
WALKING IN HOSPITAL ROOM: A LITTLE
CLIMB 3 TO 5 STEPS WITH RAILING: A LOT
PERSONAL GROOMING: A LITTLE
STANDING UP FROM CHAIR USING ARMS: A LITTLE
MOVING TO AND FROM BED TO CHAIR: A LITTLE
MOVING TO AND FROM BED TO CHAIR: A LITTLE
CLIMB 3 TO 5 STEPS WITH RAILING: A LOT
MOVING FROM LYING ON BACK TO SITTING ON SIDE OF FLAT BED WITH BEDRAILS: A LITTLE
STANDING UP FROM CHAIR USING ARMS: A LITTLE
DAILY ACTIVITIY SCORE: 22
PERSONAL GROOMING: A LITTLE
TOILETING: A LITTLE
HELP NEEDED FOR BATHING: A LITTLE
WALKING IN HOSPITAL ROOM: A LOT

## 2023-10-02 ASSESSMENT — PAIN SCALES - PAIN ASSESSMENT IN ADVANCED DEMENTIA (PAINAD)
BREATHING: NORMAL
BODYLANGUAGE: RELAXED
CONSOLABILITY: NO NEED TO CONSOLE

## 2023-10-02 ASSESSMENT — PAIN SCALES - GENERAL
PAINLEVEL_OUTOF10: 7
PAINLEVEL_OUTOF10: 8
PAINLEVEL_OUTOF10: 6
PAINLEVEL_OUTOF10: 0 - NO PAIN
PAINLEVEL_OUTOF10: 9
PAINLEVEL_OUTOF10: 10 - WORST POSSIBLE PAIN
PAINLEVEL_OUTOF10: 7
PAINLEVEL_OUTOF10: 10 - WORST POSSIBLE PAIN
PAINLEVEL_OUTOF10: 10 - WORST POSSIBLE PAIN

## 2023-10-02 ASSESSMENT — PAIN - FUNCTIONAL ASSESSMENT
PAIN_FUNCTIONAL_ASSESSMENT: WONG-BAKER FACES
PAIN_FUNCTIONAL_ASSESSMENT: 0-10

## 2023-10-02 ASSESSMENT — ACTIVITIES OF DAILY LIVING (ADL)
BATHING_ASSISTANCE: SPONGE BATHING
HOME_MANAGEMENT_TIME_ENTRY: 17

## 2023-10-02 ASSESSMENT — PAIN DESCRIPTION - DESCRIPTORS: DESCRIPTORS: OTHER (COMMENT)

## 2023-10-02 NOTE — CARE PLAN
Problem: Mobility  Goal: ambulation  Description: 150 ft with FWW mod independent  Outcome: Progressing  Goal: Stair Negotiation  Description: Pt will ascend/descend 3 steps with 2 railing mod independent  Outcome: Progressing     Problem: Transfers  Goal: sit to/from stand  Description: Sit to/.from stand mod independent  Outcome: Progressing  Goal: bed mobility  Description: Pt demonstrated supine to/from sit mod independent  Outcome: Progressing

## 2023-10-02 NOTE — PROGRESS NOTES
Eugenio Bliss is a 67 y.o. male on day 1 of admission presenting with left lower extremity radiculopathy foot pain and leg pain.  Is undergone a CT scan of the brain which was negative and MRI of the brain which was negative for stroke.  He is accompanied by his son who was present in the room informed of the results MRI of the lumbar spine has been requested as well as pain medications will initiate Decadron and ketorolac and continuous Tylenol.      Subjective      Seen and examined patient resistant to examination of the lower extremity but does have range of motion of the knee and hip appears quite anxious.  States he is unable to weight-bear on his left leg    Objective     Last Recorded Vitals  /64   Pulse 75   Temp 36.3 °C (97.3 °F) (Oral)   Resp 19   Wt 94.9 kg (209 lb 3.5 oz)   SpO2 92%   Intake/Output last 3 Shifts:    Intake/Output Summary (Last 24 hours) at 10/2/2023 0932  Last data filed at 10/2/2023 0307  Gross per 24 hour   Intake 240 ml   Output 501 ml   Net -261 ml       Admission Weight  Weight: 94.9 kg (209 lb 3.5 oz) (10/01/23 0947)    Daily Weight  10/01/23 : 94.9 kg (209 lb 3.5 oz)    Image Results  MR brain wo IV contrast  Narrative: Interpreted By:  Arnie Zamarripa,   STUDY:  MR BRAIN WO IV CONTRAST; 10/1/2023 1:13 pm      INDICATION:  Signs/Symptoms:left leg weakness;      COMPARISON:  CT head obtained earlier on 10/01/2023      ACCESSION NUMBER(S):  [id number]      ORDERING CLINICIAN:  KASSY DOOLEY      TECHNIQUE:  Multiple, multiplanar sequences of the brain were acquired.      FINDINGS:  No focal mass effect or midline shift is identified. The ventricles  and sulci are symmetric and appropriate for the patient's age.      Mild nonspecific white matter changes most consistent with chronic  small-vessel ischemic disease. The gray-white matter differentiation  is preserved. No restricted diffusion is seen.      No acute intracranial hemorrhage is seen. No intra-axial  or  extra-axial fluid collection is seen.      The visualized paranasal sinuses and mastoid air cells are clear.      Impression: No evidence for acute intracranial pathology.      Mild nonspecific white matter change most consistent with chronic  small-vessel ischemic disease.      Signed by: Arnie Zamarripa 10/1/2023 2:05 PM  Dictation workstation:   [id number]  XR chest 1 view  Narrative: Interpreted By:  Arnie Zamarripa,   STUDY:  XR CHEST 1 VIEW; 10/1/2023 11:24 am      INDICATION:  Signs/Symptoms:left leg weakness, stroke.      COMPARISON:  05/16/2023      ACCESSION NUMBER(S):  [id number]      ORDERING CLINICIAN:  KASSY DOOLEY      TECHNIQUE:  1 view of the chest was performed.      FINDINGS:  The lungs are adequately inflated. No acute consolidation. No pleural  effusion. No pneumothorax.  The cardiomediastinal silhouette is  within normal limits.      Impression: No acute cardiopulmonary disease.      Signed by: Arnie Zamarripa 10/1/2023 12:13 PM  Dictation workstation:   [id number]  CT head wo IV contrast  Narrative: Interpreted By:  Yaron Thomas,   STUDY:  CT HEAD WO IV CONTRAST; 10/1/2023 10:53 am      INDICATION:  anticoagulated head injury. Left knee and left leg weakness      COMPARISON:  No comparison exams available.      ACCESSION NUMBER(S):  [id number]      ORDERING CLINICIAN:  KASSY DOOLEY      TECHNIQUE:  CT axial images through the Brain were obtained without contrast.      FINDINGS:  Acute ischemic change: None.      Hemorrhage: No evidence of acute intracranial hemorrhage.      Mass Effect / Mass Lesion: No significant mass effect. There is no  evidence of an intracranial mass or extraaxial fluid collection.      Chronic ischemic change: Patchy foci of  low attenuation coefficient  are present within white matter which is a nonspecific finding but  likely represents moderate microvascular ischemia.      Parenchyma: There is moderate generalized volume loss for age. The  brain  parenchyma is otherwise within normal limits for age.      Ventricles: Ventriculomegaly concordant with the degree of  parenchymal volume loss.      Other: The visualized calvarium, skull base, orbits and extracranial  soft tissues are normal.      Impression: No acute intracranial process      MACRO:  none      Signed by: Yaron Thomas 10/1/2023 11:15 AM  Dictation workstation:   [id number]      Physical Exam  Heart regular rate and rhythm    Lungs clear to auscultation    Abdomen soft no rigidity rebound tenderness or guarding    Remedies without edema 2+ pulses no rash    Musculoskeletal exquisite left foot tenderness really decreased range of motion ankle and knee    Neurologic cranial nerves deep tendon reflexes motor strength grossly intact    Mood anxious      Relevant Results  Scheduled medications  acetaminophen, 325 mg, oral, 4x daily  amitriptyline, 75 mg, oral, Nightly  apixaban, 5 mg, oral, BID  ascorbic acid, 500 mg, oral, BID  aspirin, 81 mg, oral, Daily  atorvastatin, 40 mg, oral, Nightly  busPIRone, 10 mg, oral, BID  colchicine (gout), 0.6 mg, oral, BID  cyclobenzaprine, 5 mg, oral, Nightly  dexAMETHasone, 4 mg, oral, q8h ANNAMARIE  hydroxychloroquine, 200 mg, oral, Daily  levothyroxine, 50 mcg, oral, Daily  lurasidone, 40 mg, oral, Daily  metFORMIN, 500 mg, oral, BID with meals  metoprolol tartrate, 100 mg, oral, BID  multivitamin, 1 tablet, oral, Daily  pantoprazole, 20 mg, oral, Daily  pilocarpine, 5 mg, oral, 4x daily  polyethylene glycol, 17 g, oral, Daily  traZODone, 300 mg, oral, Nightly      Continuous medications     PRN medications  PRN medications: hydrOXYzine pamoate, ipratropium-albuteroL, ketorolac, nitroglycerin, oxygen                Assessment/Plan      Lumbar adiculopathy    Polyarticular gout    Chronic atrial fibrillation    Anxiety disorder,     impaired gait      Initiate Tylenol around-the-clock Decadron ketorolac check MRI of the lumbar spine initiate PT OT    In discussion  with the patient and his son present he indicates that he is a DO NOT RESUSCITATE DO NOT INTUBATE but be willing to accept ICU level care if need be CODE STATUS is updated                          Yaron Yepez,

## 2023-10-02 NOTE — CARE PLAN
Problem: Diabetes  Goal: Achieve decreasing blood glucose levels by end of shift  Outcome: Progressing  Goal: Increase stability of blood glucose readings by end of shift  Outcome: Progressing  Goal: Decrease in ketones present in urine by end of shift  Outcome: Progressing  Goal: Maintain electrolyte levels within acceptable range throughout shift  Outcome: Progressing  Goal: Maintain glucose levels >70mg/dl to <250mg/dl throughout shift  Outcome: Progressing  Goal: No changes in neurological exam by end of shift  Outcome: Progressing  Goal: Learn about and adhere to nutrition recommendations by end of shift  Outcome: Progressing  Goal: Vital signs within normal range for age by end of shift  Outcome: Progressing  Goal: Increase self care and/or family involovement by end of shift  Outcome: Progressing  Goal: Receive DSME education by end of shift  Outcome: Progressing     Problem: Pain  Goal: My pain/discomfort is manageable  Outcome: Progressing     Problem: Safety  Goal: Patient will be injury free during hospitalization  10/2/2023 0151 by Marine Muro RN  Outcome: Progressing  10/2/2023 0136 by Marine Muro RN  Outcome: Progressing  Goal: I will remain free of falls  10/2/2023 0151 by Marine Muro RN  Outcome: Progressing  10/2/2023 0136 by Marine Muro RN  Outcome: Progressing     Problem: Daily Care  Goal: Daily care needs are met  Outcome: Progressing     Problem: Psychosocial Needs  Goal: Demonstrates ability to cope with hospitalization/illness  Outcome: Progressing  Goal: Collaborate with me, my family, and caregiver to identify my specific goals  Outcome: Progressing  Flowsheets (Taken 10/2/2023 0100)  Cultural Requests During Hospitalization: none  Spiritual Requests During Hospitalization: none     Problem: Discharge Barriers  Goal: My discharge needs are met  Outcome: Progressing     Problem: Skin  Goal: Decreased wound size/increased tissue granulation at next  dressing change  Outcome: Progressing  Goal: Participates in plan/prevention/treatment measures  Outcome: Progressing  Goal: Prevent/manage excess moisture  Outcome: Progressing  Goal: Prevent/minimize sheer/friction injuries  Outcome: Progressing  Goal: Promote/optimize nutrition  Outcome: Progressing  Goal: Promote skin healing  Outcome: Progressing

## 2023-10-02 NOTE — PROGRESS NOTES
Physical Therapy    Physical Therapy Evaluation & Treatment    Patient Name: Eugenio Bliss  MRN: [id number]  Today's Date: 10/2/2023        Assessment/Plan   PT Assessment  PT Assessment Results: Decreased strength, Decreased range of motion, Decreased endurance, Impaired balance, Decreased mobility, Decreased coordination, Decreased cognition, Decreased safety awareness, Impaired vision, Impaired hearing, Pain  Rehab Prognosis: Good  Evaluation/Treatment Tolerance: Patient tolerated treatment well  Strengths: Attitude of self, Access to adaptive/assistive products, Ability to acquire knowledge  Barriers to Participation:  (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)  End of Session Communication: Bedside nurse  End of Session Patient Position:  (pt sitting edge of bed with OT starting to perform ADL's)  IP OR SWING BED PT PLAN  Inpatient or Swing Bed: Inpatient  PT Plan  Treatment/Interventions: Bed mobility, Transfer training, Gait training, Stair training, Balance training, Strengthening, Endurance training, Range of motion, Therapeutic exercise, Therapeutic activity, Home exercise program  PT Plan: Skilled PT  PT Frequency: 4 times per week  PT Discharge Recommendations: Moderate intensity level of continued care  Equipment Recommended upon Discharge: Wheeled walker  PT Recommended Transfer Status: Assist x1  PT - OK to Discharge: Yes      Subjective     General Visit Information:  General  Reason for Referral: left knee/ankle pain; inability to amb  Past Medical History Relevant to Rehab: CHF, A-fib, + Eliquis, COPD, DM, Sjorgren's, MAURICE with 3 liters o2 at night, SLE, chronic pain  Family/Caregiver Present: Yes  Caregiver Feedback: son  Patient Position Received: Bed, 2 rail up  Preferred Learning Style: visual, written, verbal  Home Living:  Home Living  Type of Home: House  Home Adaptive Equipment: Walker rolling or standard, Cane, Crutches, Wheelchair-manual  Home Layout: One level  Bathroom Shower/Tub: Walk-in shower,  Tub/shower unit  Bathroom Equipment: Grab bars in shower  Prior Level of Function:  Prior Function Per Pt/Caregiver Report  Level of [place]: Independent with ADLs and functional transfers  Vocational: Retired  Precautions:  Precautions  Hearing/Visual Limitations: GLASSES AT ALL TIMES/mild [place]  Medical Precautions: Fall precautions  Vital Signs:  Vital Signs  Heart Rate: 75  SpO2: 97 %  BP: 110/64  BP Location: Right arm  Patient Position: Sitting    Objective   Pain:  Pain Assessment  Pain Assessment: [place] FACES  Pain Score: 10 - Worst possible pain  Pain Type: Acute pain  Pain Location: Abdomen  Pain Orientation: Left  Pain Frequency: Constant/continuous  Cognition:  Cognition  Overall Cognitive Status: Within Functional Limits  Orientation Level: Oriented X4  Memory:  (appears forgetful)  Safety/Judgement:  (decreaaed)    General Assessments:                Activity Tolerance  Activity Tolerance Comments: decreased due to pain and fatigues easily    Sensation  Light Touch: No apparent deficits    Static Sitting Balance  Static Sitting-Balance Support: Feet supported  Static Sitting-Level of Assistance: Distant supervision  Dynamic Sitting Balance  Dynamic Sitting-Balance Support: Feet supported    Static Standing Balance  Static Standing-Balance Support: Bilateral upper extremity supported  Static Standing-Level of Assistance: Minimum assistance  Dynamic Standing Balance  Dynamic Standing-Balance Support: Bilateral upper extremity supported  Functional Assessments:  Bed Mobility  Bed Mobility: Yes  Bed Mobility 1  Bed Mobility 1: Supine to sitting  Level of Assistance 1: Close supervision    Transfers  Transfer: Yes  Transfer 1  Transfer From 1: Sit to (stand)  Transfer to 1: Stand  Transfer Level of Assistance 1: Minimum assistance  Transfers 2  Transfer From 2: Stand to  Transfer to 2: Sit  Transfer Level of Assistance 2: Minimum assistance    Ambulation/Gait Training  Ambulation/Gait Training  Performed: Yes  Ambulation/Gait Training 1  Surface 1: Level tile  Device 1: Rolling walker  Assistance 1: Minimal verbal cues  Quality of Gait 1: Wide base of support  Comments/Distance (ft) 1:  (pt amb 10 lateralsteps to right)  Extremity/Trunk Assessments:    Treatments:  Therapeutic Exercise  Therapeutic Exercise Performed: Yes  Therapeutic Exercise Activity 1: supine and seated bilateral LE anti-embolics, LAQ's, marching; supine right heel slidesx 10 to 15 reos each    Bed Mobility  Bed Mobility: Yes  Bed Mobility 1  Bed Mobility 1: Supine to sitting  Level of Assistance 1: Close supervision    Ambulation/Gait Training  Ambulation/Gait Training Performed: Yes  Ambulation/Gait Training 1  Surface 1: Level tile  Device 1: Rolling walker  Assistance 1: Minimal verbal cues  Quality of Gait 1: Wide base of support  Comments/Distance (ft) 1:  (pt amb 10 lateralsteps to right)  Transfers  Transfer: Yes  Transfer 1  Transfer From 1: Sit to (stand)  Transfer to 1: Stand  Transfer Level of Assistance 1: Minimum assistance  Transfers 2  Transfer From 2: Stand to  Transfer to 2: Sit  Transfer Level of Assistance 2: Minimum assistance  Outcome Measures:  [facility] Basic Mobility  Turning from your back to your side while in a flat bed without using bedrails: A little  Moving from lying on your back to sitting on the side of a flat bed without using bedrails: A little  Moving to and from bed to chair (including a wheelchair): A little  Standing up from a chair using your arms (e.g. wheelchair or bedside chair): A little  To walk in hospital room: A lot  Climbing 3-5 steps with railing: A lot  Basic Mobility - Total Score: 16    Encounter Problems       Encounter Problems (Active)       Mobility       LTG - Patient will ambulate household distance (Progressing)       Start:  10/02/23    Expected End:  10/16/23       Pt will ambulate household distance independently w/ FWW.         STG - Patient will ambulate (Progressing)        Start:  10/02/23    Expected End:  10/16/23       Pt will ambulate safe household distance independently w/ FWW.            Mobility       ambulation (Progressing)       Start:  10/02/23    Expected End:  10/16/23       150 ft with FWW mod independent         Stair Negotiation (Progressing)       Start:  10/02/23    Expected End:  10/16/23       Pt will ascend/descend 3 steps with 2 railing mod independent            Transfers       STG - Transfer from bed to chair (Progressing)       Start:  10/02/23    Expected End:  10/16/23       Pt will demonstrate bed to chair transfer w/ close supervision w/ FWW.         STG - Patient to transfer to and from sit to supine (Progressing)       Start:  10/02/23    Expected End:  10/16/23       Pt will demonstrate supine to sit, sit to supine independently.         STG - Patient will transfer sit to and from stand (Progressing)       Start:  10/02/23    Expected End:  10/16/23       Pt will demon. Sit to stand, stand to sit w/ independently w/ FWW.          STG - Patient will perform toilet transfer (Progressing)       Start:  10/02/23    Expected End:  10/16/23       Pt will demonstrate toilet transfer w/ close supervision and FWW.             Transfers       sit to/from stand (Progressing)       Start:  10/02/23    Expected End:  10/16/23       Sit to/.from stand mod independent         bed mobility (Progressing)       Start:  10/02/23    Expected End:  10/16/23       Pt demonstrated supine to/from sit mod independent                Education Documentation  Precautions, taught by Nehal Zarco, PT at 10/2/2023 11:35 AM.  Learner: Patient  Readiness: Acceptance  Method: Explanation  Response: Verbalizes Understanding    Education Comments  No comments found.

## 2023-10-02 NOTE — CARE PLAN
Problem: Safety  Goal: Patient will be injury free during hospitalization  Outcome: Progressing  Goal: I will remain free of falls  Outcome: Progressing

## 2023-10-02 NOTE — CARE PLAN
Problem: Bathing  Goal: Whole body bathing  Description: Pt will demon. UB/ LB bathing independently w/ AE as needed.  Outcome: Progressing     Problem: Dressings Lower Extremities  Goal: LTG - Lower body dressing  Description: Pt will demon. LB dressing independently w/ AE as needed.  Outcome: Progressing     Problem: Dressing Upper Extremities  Goal: Patient will complete upper body dressing  Description: Pt will demon. UB dressing independently.  Outcome: Progressing     Problem: Grooming  Goal: LTG - Patient will complete daily grooming tasks  Description: Pt will demonstrate grooming tasks independently.  Outcome: Progressing  Goal: STG - Patient will tolerate standing  Description: Pt will demonstrate grooming tasks in stance at sink independently.  Outcome: Progressing     Problem: Mobility  Goal: LTG - Patient will ambulate household distance  Description: Pt will ambulate household distance independently w/ FWW.  Outcome: Progressing  Goal: STG - Patient will ambulate  Description: Pt will ambulate safe household distance independently w/ FWW.  Outcome: Progressing     Problem: Toileting  Goal: LTG - Patient will complete daily toileting tasks  Description: Pt will complete daily toileting tasks independently.  Outcome: Progressing  Goal: STG - Patient will complete clothing management  Description: Pt will demonstrate pulling underwear / pants up/ down independently.  Outcome: Progressing  Goal: STG - Patient completes hygiene  Description: Pt will complete hygiene tasks independently.  Outcome: Progressing     Problem: Transfers  Goal: LTG - Patient will demonstrate safe transfer techniques  Description: Pt will demonstrate bed, chair and toilet transfers independently.  Outcome: Progressing  Goal: STG - Transfer from bed to chair  Description: Pt will demonstrate bed to chair transfer w/ close supervision w/ FWW.  Outcome: Progressing  Goal: STG - Patient to transfer to and from sit to  supine  Description: Pt will demonstrate supine to sit, sit to supine independently.  Outcome: Progressing  Goal: STG - Patient will transfer sit to and from stand  Description: Pt will demon. Sit to stand, stand to sit w/ independently w/ FWW.   Outcome: Progressing  Goal: STG - Patient will perform toilet transfer  Description: Pt will demonstrate toilet transfer w/ close supervision and FWW.   Outcome: Progressing

## 2023-10-02 NOTE — PROGRESS NOTES
Occupational Therapy    Evaluation    Patient Name: Eugenio Bliss  MRN: [id number]  Today's Date: 10/2/2023  Time Calculation  Start Time: 0901  Stop Time: 0933  Time Calculation (min): 32 min        Assessment:  OT Assessment: Pt seen thisdate for inital eval. Pt w/ decreased ADL skills, functional mobility, decreasedactivity tolerance.  Prognosis: Good  Barriers to Discharge: None  Evaluation/Treatment Tolerance: Patient limited by pain  Medical Staff Made Aware: Yes  OT Assessment Results: Decreased ADL status, Decreased endurance, Decreased functional mobility  Prognosis: Good  Barriers to Discharge: None  Evaluation/Treatment Tolerance: Patient limited by pain  Medical Staff Made Aware: Yes  Strengths: Ability to acquire knowledge, Attitude of self, Capable of completing ADLs semi/independent, Housing layout, Premorbid level of function, Support and attitude of living partners, Support of extended family/friends  Plan:  Treatment Interventions: ADL retraining, Functional transfer training, Endurance training  OT Frequency: 3 times per week  OT Discharge Recommendations: Moderate intensity level of continued care  OT Recommended Transfer Status: Assist of 1, Assistive equipment (Comment) (fww)  OT - OK to Discharge: Yes  Treatment Interventions: ADL retraining, Functional transfer training, Endurance training    Subjective     General  Reason for Referral: Lt knee/ ankle pain, inability to ambulate  Referred By: Dr. Yepez  Past Medical History Relevant to Rehab: CHF, A-fib, COPD, DMII, Sjorgren's, MAURICE on 3L O2 at night, chr. pain  Family/Caregiver Present: Yes  Caregiver Feedback: son  Prior to Session Communication: Bedside nurse  Patient Position Received: Bed, 1 rail up  Preferred Learning Style: verbal, visual, written  Precautions:     Vital Signs:  BP: 110/64  MAP (mmHg): 72  BP Location: Right arm  BP Method: Automatic  Patient Position: Sitting  Pain:  Pain Assessment  Pain Score: 10 - Worst possible  pain  Pain Type: Acute pain  Pain Location: Leg  Pain Orientation: Left  Pain Descriptors: Other (Comment) (constant)  Clinical Progression: Not changed    Objective   Cognition:  Overall Cognitive Status: Within Functional Limits  Orientation Level: Oriented X4  Insight: Within function limits           Home Living:  Type of Home: House  Lives With: Spouse  Home Adaptive Equipment: Walker rolling or standard, Cane, Crutches  Home Layout: One level  Home Access: Stairs to enter with rails  Bathroom Shower/Tub: Walk-in shower, Tub/shower unit  Bathroom Toilet: Standard  Bathroom Equipment: Grab bars in shower  Prior Function:  Level of [place]: Independent with ADLs and functional transfers  Vocational: Retired  Hand Dominance: Right  IADL History:  Homemaking Responsibilities: Yes  Meal Prep Responsibility: Primary  Laundry Responsibility: Primary  Cleaning Responsibility: Primary  Bill Paying/Finance Responsibility: Primary  Shopping Responsibility: Primary  Current License: Yes  Mode of Transportation: Car  Occupation: Retired  ADL:  Grooming Assistance: Stand by  Grooming Deficit: Setup  Bathing Assistance: Sponge bathing  Bathing Deficit: Setup  UE Dressing Assistance: Minimal  UE Dressing Deficit: Setup  LE Dressing Assistance: Not performed  Toileting Assistance with Device: Not performed  Functional Assistance: Minimal  Functional Deficit: Setup  Activity Tolerance:  Endurance: Decreased tolerance for upright activites  Bed Mobility/Transfers: Bed Mobility  Bed Mobility: Yes  Bed Mobility 1  Bed Mobility 1: Supine to sitting  Level of Assistance 1: Close supervision  Bed Mobility 2  Bed Mobility  2: Sitting to supine  Level of Assistance 2: Close supervision     Sensation:  Light Touch: No apparent deficits  Sharp/Dull: No apparent deficits  Stereognosis: No apparent deficits  Proprioception: No apparent deficits  Strength:  Strength Comments: RUE 4/5, LUE 4/5  Perception:  Inattention/Neglect: Appears  intact  Initiation: Appears intact  Motor Planning: Appears intact  Perseveration: Not present  Coordination:  Movements are Fluid and Coordinated: Yes   Hand Function:  Gross Grasp: Functional  Coordination: Functional        Outcome Measures:[facility] Daily Activity  Putting on and taking off regular lower body clothing: A little  Bathing (including washing, rinsing, drying): A little  Putting on and taking off regular upper body clothing: A little  Toileting, which includes using toilet, bedpan or urinal: A little  Taking care of personal grooming such as brushing teeth: A little  Eating Meals: None  Daily Activity - Total Score: 19      OP EDUCATION:  Education  Individual(s) Educated: Patient, Child  Education Provided: POC discussed and agreed upon  Patient/Caregiver Demonstrated Understanding: yes  Plan of Care Discussed and Agreed Upon: yes  Patient Response to Education: Patient/Caregiver Verbalized Understanding of Information    Goals:  Encounter Problems       Encounter Problems (Active)       Bathing       Whole body bathing (Progressing)       Start:  10/02/23    Expected End:  10/16/23       Pt will demon. UB/ LB bathing independently w/ AE as needed.            Dressing Upper Extremities       Patient will complete upper body dressing (Progressing)       Start:  10/02/23    Expected End:  10/16/23       Pt will demon. UB dressing independently.            Dressings Lower Extremities       LTG - Lower body dressing (Progressing)       Start:  10/02/23    Expected End:  10/16/23       Pt will demon. LB dressing independently w/ AE as needed.            Grooming       LTG - Patient will complete daily grooming tasks (Progressing)       Start:  10/02/23    Expected End:  10/16/23       Pt will demonstrate grooming tasks independently.         STG - Patient will tolerate standing (Progressing)       Start:  10/02/23    Expected End:  10/16/23       Pt will demonstrate grooming tasks in stance at sink  independently.            Mobility       LTG - Patient will ambulate household distance (Progressing)       Start:  10/02/23    Expected End:  10/16/23       Pt will ambulate household distance independently w/ FWW.         STG - Patient will ambulate (Progressing)       Start:  10/02/23    Expected End:  10/16/23       Pt will ambulate safe household distance independently w/ FWW.            Mobility       ambulation (Progressing)       Start:  10/02/23    Expected End:  10/16/23       150 ft with FWW mod independent         Stair Negotiation (Progressing)       Start:  10/02/23    Expected End:  10/16/23       Pt will ascend/descend 3 steps with 2 railing mod independent            Toileting       LTG - Patient will complete daily toileting tasks (Progressing)       Start:  10/02/23    Expected End:  10/16/23       Pt will complete daily toileting tasks independently.         STG - Patient will complete clothing management (Progressing)       Start:  10/02/23    Expected End:  10/16/23       Pt will demonstrate pulling underwear / pants up/ down independently.         STG - Patient completes hygiene (Progressing)       Start:  10/02/23    Expected End:  10/16/23       Pt will complete hygiene tasks independently.            Transfers       STG - Transfer from bed to chair (Progressing)       Start:  10/02/23    Expected End:  10/16/23       Pt will demonstrate bed to chair transfer w/ close supervision w/ FWW.         STG - Patient to transfer to and from sit to supine (Progressing)       Start:  10/02/23    Expected End:  10/16/23       Pt will demonstrate supine to sit, sit to supine independently.         STG - Patient will transfer sit to and from stand (Progressing)       Start:  10/02/23    Expected End:  10/16/23       Pt will demon. Sit to stand, stand to sit w/ independently w/ FWW.          STG - Patient will perform toilet transfer (Progressing)       Start:  10/02/23    Expected End:  10/16/23       Pt  will demonstrate toilet transfer w/ close supervision and FWW.             Transfers       sit to/from stand (Progressing)       Start:  10/02/23    Expected End:  10/16/23       Sit to/.from stand mod independent         bed mobility (Progressing)       Start:  10/02/23    Expected End:  10/16/23       Pt demonstrated supine to/from sit mod independent                                                             Patricia Hi, OT

## 2023-10-03 PROBLEM — M54.16 ACUTE LEFT LUMBAR RADICULOPATHY: Status: ACTIVE | Noted: 2023-10-03

## 2023-10-03 LAB
C DIF TOX TCDA+TCDB STL QL NAA+PROBE: NOT DETECTED
GLUCOSE BLD MANUAL STRIP-MCNC: 169 MG/DL (ref 74–99)
GLUCOSE BLD MANUAL STRIP-MCNC: 181 MG/DL (ref 74–99)
GLUCOSE BLD MANUAL STRIP-MCNC: 190 MG/DL (ref 74–99)
GLUCOSE BLD MANUAL STRIP-MCNC: 191 MG/DL (ref 74–99)

## 2023-10-03 PROCEDURE — 2500000001 HC RX 250 WO HCPCS SELF ADMINISTERED DRUGS (ALT 637 FOR MEDICARE OP): Performed by: INTERNAL MEDICINE

## 2023-10-03 PROCEDURE — 2500000002 HC RX 250 W HCPCS SELF ADMINISTERED DRUGS (ALT 637 FOR MEDICARE OP, ALT 636 FOR OP/ED): Performed by: INTERNAL MEDICINE

## 2023-10-03 PROCEDURE — 2500000004 HC RX 250 GENERAL PHARMACY W/ HCPCS (ALT 636 FOR OP/ED): Performed by: INTERNAL MEDICINE

## 2023-10-03 PROCEDURE — 1100000001 HC PRIVATE ROOM DAILY

## 2023-10-03 PROCEDURE — 2060000001 HC INTERMEDIATE ICU ROOM DAILY

## 2023-10-03 PROCEDURE — 82947 ASSAY GLUCOSE BLOOD QUANT: CPT

## 2023-10-03 RX ORDER — ALLOPURINOL 100 MG/1
100 TABLET ORAL DAILY
Status: DISCONTINUED | OUTPATIENT
Start: 2023-10-03 | End: 2023-10-05 | Stop reason: HOSPADM

## 2023-10-03 RX ADMIN — OXYCODONE HYDROCHLORIDE AND ACETAMINOPHEN 500 MG: 500 TABLET ORAL at 20:41

## 2023-10-03 RX ADMIN — METFORMIN HYDROCHLORIDE 500 MG: 500 TABLET, FILM COATED ORAL at 11:22

## 2023-10-03 RX ADMIN — ACETAMINOPHEN 325 MG: 325 TABLET, FILM COATED ORAL at 17:20

## 2023-10-03 RX ADMIN — METOPROLOL 100 MG: 100 TABLET ORAL at 11:23

## 2023-10-03 RX ADMIN — HYDROXYCHLOROQUINE SULFATE 200 MG: 200 TABLET ORAL at 11:23

## 2023-10-03 RX ADMIN — PILOCARPINE HYDROCHLORIDE 5 MG: 5 TABLET, FILM COATED ORAL at 06:38

## 2023-10-03 RX ADMIN — METFORMIN HYDROCHLORIDE 500 MG: 500 TABLET, FILM COATED ORAL at 17:20

## 2023-10-03 RX ADMIN — ATORVASTATIN CALCIUM 40 MG: 40 TABLET, FILM COATED ORAL at 20:41

## 2023-10-03 RX ADMIN — ALLOPURINOL 100 MG: 100 TABLET ORAL at 11:23

## 2023-10-03 RX ADMIN — APIXABAN 5 MG: 5 TABLET, FILM COATED ORAL at 11:23

## 2023-10-03 RX ADMIN — PILOCARPINE HYDROCHLORIDE 5 MG: 5 TABLET, FILM COATED ORAL at 17:20

## 2023-10-03 RX ADMIN — ASPIRIN 81 MG: 81 TABLET, COATED ORAL at 11:23

## 2023-10-03 RX ADMIN — BUSPIRONE HYDROCHLORIDE 10 MG: 10 TABLET ORAL at 20:41

## 2023-10-03 RX ADMIN — PILOCARPINE HYDROCHLORIDE 5 MG: 5 TABLET, FILM COATED ORAL at 20:36

## 2023-10-03 RX ADMIN — TRAZODONE HYDROCHLORIDE 300 MG: 100 TABLET ORAL at 20:36

## 2023-10-03 RX ADMIN — DEXAMETHASONE 4 MG: 4 TABLET ORAL at 06:38

## 2023-10-03 RX ADMIN — DEXAMETHASONE 4 MG: 4 TABLET ORAL at 21:08

## 2023-10-03 RX ADMIN — BUSPIRONE HYDROCHLORIDE 10 MG: 10 TABLET ORAL at 11:23

## 2023-10-03 RX ADMIN — LURASIDONE HYDROCHLORIDE 40 MG: 40 TABLET, FILM COATED ORAL at 11:24

## 2023-10-03 RX ADMIN — OXYCODONE HYDROCHLORIDE AND ACETAMINOPHEN 500 MG: 500 TABLET ORAL at 11:23

## 2023-10-03 RX ADMIN — MULTIVITAMIN TABLET 1 TABLET: TABLET at 11:23

## 2023-10-03 RX ADMIN — CYCLOBENZAPRINE 5 MG: 10 TABLET, FILM COATED ORAL at 20:36

## 2023-10-03 RX ADMIN — LEVOTHYROXINE SODIUM 50 MCG: 0.05 TABLET ORAL at 11:23

## 2023-10-03 RX ADMIN — ACETAMINOPHEN 325 MG: 325 TABLET, FILM COATED ORAL at 06:38

## 2023-10-03 RX ADMIN — APIXABAN 5 MG: 5 TABLET, FILM COATED ORAL at 20:41

## 2023-10-03 RX ADMIN — DEXAMETHASONE 4 MG: 4 TABLET ORAL at 13:11

## 2023-10-03 RX ADMIN — PILOCARPINE HYDROCHLORIDE 5 MG: 5 TABLET, FILM COATED ORAL at 13:11

## 2023-10-03 RX ADMIN — METOPROLOL 100 MG: 100 TABLET ORAL at 20:51

## 2023-10-03 RX ADMIN — AMITRIPTYLINE HYDROCHLORIDE 75 MG: 25 TABLET, FILM COATED ORAL at 20:36

## 2023-10-03 RX ADMIN — ACETAMINOPHEN 325 MG: 325 TABLET, FILM COATED ORAL at 13:11

## 2023-10-03 RX ADMIN — PANTOPRAZOLE SODIUM 20 MG: 20 TABLET, DELAYED RELEASE ORAL at 11:23

## 2023-10-03 RX ADMIN — ACETAMINOPHEN 325 MG: 325 TABLET, FILM COATED ORAL at 20:36

## 2023-10-03 ASSESSMENT — COGNITIVE AND FUNCTIONAL STATUS - GENERAL
TOILETING: A LITTLE
HELP NEEDED FOR BATHING: A LITTLE
DRESSING REGULAR UPPER BODY CLOTHING: A LITTLE
PERSONAL GROOMING: A LITTLE
DRESSING REGULAR UPPER BODY CLOTHING: A LITTLE
HELP NEEDED FOR BATHING: A LITTLE
DAILY ACTIVITIY SCORE: 20
TOILETING: A LITTLE
WALKING IN HOSPITAL ROOM: A LITTLE
MOBILITY SCORE: 20
DAILY ACTIVITIY SCORE: 19
STANDING UP FROM CHAIR USING ARMS: A LITTLE
WALKING IN HOSPITAL ROOM: A LOT
MOVING TO AND FROM BED TO CHAIR: A LITTLE
MOBILITY SCORE: 16
DRESSING REGULAR LOWER BODY CLOTHING: A LITTLE
CLIMB 3 TO 5 STEPS WITH RAILING: A LOT
TURNING FROM BACK TO SIDE WHILE IN FLAT BAD: A LITTLE
CLIMB 3 TO 5 STEPS WITH RAILING: A LOT
MOVING TO AND FROM BED TO CHAIR: A LITTLE
MOVING FROM LYING ON BACK TO SITTING ON SIDE OF FLAT BED WITH BEDRAILS: A LITTLE
DRESSING REGULAR LOWER BODY CLOTHING: A LITTLE

## 2023-10-03 ASSESSMENT — PAIN SCALES - GENERAL
PAINLEVEL_OUTOF10: 0 - NO PAIN
PAINLEVEL_OUTOF10: 3
PAINLEVEL_OUTOF10: 0 - NO PAIN

## 2023-10-03 ASSESSMENT — PAIN - FUNCTIONAL ASSESSMENT: PAIN_FUNCTIONAL_ASSESSMENT: 0-10

## 2023-10-03 NOTE — PROGRESS NOTES
"Eugenio Bliss is a 67 y.o. male on day 2 of admission presenting with Acute left lumbar radiculopathy.    Subjective   During nursing rounds, RN Informed TCSW pt will require SNF at discharge. PTOT recommends moderate intensity rehab post discharge, and pt has not ambulated well enough to discharge home with [facility].   TCSW will provide pt with SNF list to review and procure SNF choices. TCSW will follow up with pt tomorrow for choices. Pt will require a precert prior to discharge.     Objective     Physical Exam    Last Recorded Vitals  Blood pressure 126/76, pulse 71, temperature 36.6 °C (97.9 °F), temperature source Oral, resp. rate 16, height 1.702 m (5' 7\"), weight 94.9 kg (209 lb 3.5 oz), SpO2 96 %.  Intake/Output last 3 Shifts:  I/O last 3 completed shifts:  In: 360 (3.8 mL/kg) [P.O.:360]  Out: 876 (9.2 mL/kg) [Urine:875 (0.3 mL/kg/hr); Stool:1]  Weight: 94.9 kg     Relevant Results                             Assessment/Plan   Principal Problem:    Acute left lumbar radiculopathy            Marian Don LCSW      "

## 2023-10-03 NOTE — CARE PLAN
The patient's goals for the shift include      The clinical goals for the shift include less pain

## 2023-10-03 NOTE — PROGRESS NOTES
Eugenio Bliss is a 67 y.o. male on day 2 of admission presenting with left lower extremity pain and weakness.  CT scan was negative for stroke as well as MRI of the brain.  Imaging of the lumbar spine is pending review it is improved with the addition of Decadron and adjustment pain medications PT OT assessments are ongoing the patient did have loose stool last night was placed in C. difficile isolation with the specimen being sent this is likely related to his bowel regime rather than actual C. difficile      Subjective   Eugenio Bliss is a 67 y.o. male on day 2 of admission presenting with left lower extremity pain and weakness.  CT scan was negative for stroke as well as MRI of the brain.  Imaging of the lumbar spine is pending review it is improved with the addition of Decadron and adjustment pain medications PT OT assessments are ongoing the patient did have loose stool last night was placed in C. difficile isolation with the specimen being sent this is likely related to his bowel regime rather than actual C. difficile       Objective     Last Recorded Vitals  /84 (BP Location: Right arm, Patient Position: Lying)   Pulse 70   Temp 36.8 °C (98.2 °F) (Oral)   Resp 16   Wt 94.9 kg (209 lb 3.5 oz)   SpO2 99%   Intake/Output last 3 Shifts:    Intake/Output Summary (Last 24 hours) at 10/3/2023 0905  Last data filed at 10/2/2023 1814  Gross per 24 hour   Intake 120 ml   Output 576 ml   Net -456 ml       Admission Weight  Weight: 94.9 kg (209 lb 3.5 oz) (10/01/23 0947)    Daily Weight  10/01/23 : 94.9 kg (209 lb 3.5 oz)    Image Results  MR lumbar spine wo IV contrast  Narrative: Interpreted By:  Arelis Lara,   STUDY:  MR LUMBAR SPINE WO IV CONTRAST; 10/2/2023 9:30 pm      INDICATION:  Signs/Symptoms:Radiculopathy.      COMPARISON:  None      ACCESSION NUMBER(S):  [id number]      ORDERING CLINICIAN:  SIMON HECK      TECHNIQUE:  Multiple, multiplanar sequences of the lumbar spine were  obtained.      FINDINGS:  The normal lumbar lordosis is preserved. The conus terminates at  L1-L2. No acute fracture is identified. No vertebral hemangioma is  noted in the L1 vertebral body. No other STIR abnormalities are seen  within the marrow.      The vertebral bodies are grossly preserved. There is grade 1  anterolisthesis of L4 on L5.      L1-L2: No disc herniation. No significant canal or foraminal stenosis.  L2-L3: No disc herniation. No significant canal or foraminal stenosis.  L3-L4: Mild diffuse disc bulge and bilateral facet osteoarthropathy.  No significant canal stenosis. Mild bilateral neural foraminal  narrowing. L4-L5: Mild diffuse disc bulge and bilateral facet  osteoarthropathy. No significant canal stenosis. Moderate to severe  bilateral neural foraminal narrowing. L5-S1: Mild diffuse disc bulge.  No significant canal or foraminal stenosis.          Impression: Multilevel degenerative changes of the lumbar spine with variable  degree of spinal canal and neural foraminal narrowing as detailed  above, worst at L3-L4 and L4-L5.      MACRO:  None.      Signed by: Arelis Lara 10/2/2023 9:44 PM  Dictation workstation:   [id number]      Physical Exam  Constitutional:       Appearance: Normal appearance.   HENT:      Head: Normocephalic and atraumatic.      Mouth/Throat:      Mouth: Mucous membranes are moist.   Eyes:      Extraocular Movements: Extraocular movements intact.      Pupils: Pupils are equal, round, and reactive to light.   Cardiovascular:      Rate and Rhythm: Normal rate and regular rhythm.   Pulmonary:      Effort: Pulmonary effort is normal.      Breath sounds: Normal breath sounds.   Abdominal:      General: Abdomen is flat.      Palpations: Abdomen is soft.   Musculoskeletal:      Cervical back: Normal range of motion and neck supple.   Skin:     General: Skin is warm and dry.      Capillary Refill: Capillary refill takes less than 2 seconds.   Neurological:      General: No  focal deficit present.      Mental Status: He is alert and oriented to person, place, and time. Mental status is at baseline.   Psychiatric:         Mood and Affect: Mood normal.         Relevant Results               Assessment/Plan                  Principal Problem:    Stroke of unknown cause (CMS/HCC)    Patient has no evidence of stroke he does not fact have lumbar radiculopathy with resultant left leg pain and weakness finally he also has evidence of gout which is improved clinically will dose adjust medications he developed loose stool likely secondary to the combined nature of MiraLAX and colchicine.  Ongoing PT OT evaluation anticipating discharge in 24 hours with home health services              Yaron Yepez DO

## 2023-10-03 NOTE — NURSING NOTE
Dr ortega was notified that pt is having numbness in left arm and leg.  is aware and stated no stroke protocol is needed.

## 2023-10-04 ENCOUNTER — APPOINTMENT (OUTPATIENT)
Dept: CARDIOLOGY | Facility: HOSPITAL | Age: 68
DRG: 552 | End: 2023-10-04
Payer: MEDICARE

## 2023-10-04 LAB
ATRIAL RATE: 65 BPM
GLUCOSE BLD MANUAL STRIP-MCNC: 132 MG/DL (ref 74–99)
GLUCOSE BLD MANUAL STRIP-MCNC: 151 MG/DL (ref 74–99)
GLUCOSE BLD MANUAL STRIP-MCNC: 167 MG/DL (ref 74–99)
GLUCOSE BLD MANUAL STRIP-MCNC: 180 MG/DL (ref 74–99)
P AXIS: 20 DEGREES
P OFFSET: 187 MS
P ONSET: 138 MS
PR INTERVAL: 146 MS
Q ONSET: 211 MS
QRS COUNT: 11 BEATS
QRS DURATION: 92 MS
QT INTERVAL: 412 MS
QTC CALCULATION(BAZETT): 428 MS
QTC FREDERICIA: 422 MS
R AXIS: -55 DEGREES
T AXIS: 33 DEGREES
T OFFSET: 417 MS
VENTRICULAR RATE: 65 BPM

## 2023-10-04 PROCEDURE — 93010 ELECTROCARDIOGRAM REPORT: CPT | Performed by: INTERNAL MEDICINE

## 2023-10-04 PROCEDURE — 2500000002 HC RX 250 W HCPCS SELF ADMINISTERED DRUGS (ALT 637 FOR MEDICARE OP, ALT 636 FOR OP/ED): Performed by: INTERNAL MEDICINE

## 2023-10-04 PROCEDURE — 2500000001 HC RX 250 WO HCPCS SELF ADMINISTERED DRUGS (ALT 637 FOR MEDICARE OP): Performed by: INTERNAL MEDICINE

## 2023-10-04 PROCEDURE — 97116 GAIT TRAINING THERAPY: CPT | Mod: GP

## 2023-10-04 PROCEDURE — 2060000001 HC INTERMEDIATE ICU ROOM DAILY

## 2023-10-04 PROCEDURE — 1100000001 HC PRIVATE ROOM DAILY

## 2023-10-04 PROCEDURE — 2500000004 HC RX 250 GENERAL PHARMACY W/ HCPCS (ALT 636 FOR OP/ED): Performed by: INTERNAL MEDICINE

## 2023-10-04 PROCEDURE — 97110 THERAPEUTIC EXERCISES: CPT | Mod: GP

## 2023-10-04 PROCEDURE — 93005 ELECTROCARDIOGRAM TRACING: CPT

## 2023-10-04 PROCEDURE — 97110 THERAPEUTIC EXERCISES: CPT | Mod: GO

## 2023-10-04 RX ADMIN — DEXAMETHASONE 4 MG: 4 TABLET ORAL at 13:02

## 2023-10-04 RX ADMIN — KETOROLAC TROMETHAMINE 15 MG: 30 INJECTION, SOLUTION INTRAMUSCULAR at 16:29

## 2023-10-04 RX ADMIN — BUSPIRONE HYDROCHLORIDE 10 MG: 10 TABLET ORAL at 09:24

## 2023-10-04 RX ADMIN — CYCLOBENZAPRINE 5 MG: 10 TABLET, FILM COATED ORAL at 20:10

## 2023-10-04 RX ADMIN — METOPROLOL 100 MG: 100 TABLET ORAL at 20:10

## 2023-10-04 RX ADMIN — LEVOTHYROXINE SODIUM 50 MCG: 0.05 TABLET ORAL at 09:23

## 2023-10-04 RX ADMIN — ALLOPURINOL 100 MG: 100 TABLET ORAL at 09:24

## 2023-10-04 RX ADMIN — PANTOPRAZOLE SODIUM 20 MG: 20 TABLET, DELAYED RELEASE ORAL at 09:24

## 2023-10-04 RX ADMIN — PILOCARPINE HYDROCHLORIDE 5 MG: 5 TABLET, FILM COATED ORAL at 16:28

## 2023-10-04 RX ADMIN — MULTIVITAMIN TABLET 1 TABLET: TABLET at 09:23

## 2023-10-04 RX ADMIN — ACETAMINOPHEN 325 MG: 325 TABLET, FILM COATED ORAL at 06:07

## 2023-10-04 RX ADMIN — TRAZODONE HYDROCHLORIDE 300 MG: 100 TABLET ORAL at 20:10

## 2023-10-04 RX ADMIN — BUSPIRONE HYDROCHLORIDE 10 MG: 10 TABLET ORAL at 20:10

## 2023-10-04 RX ADMIN — OXYCODONE HYDROCHLORIDE AND ACETAMINOPHEN 500 MG: 500 TABLET ORAL at 09:24

## 2023-10-04 RX ADMIN — OXYCODONE HYDROCHLORIDE AND ACETAMINOPHEN 500 MG: 500 TABLET ORAL at 20:09

## 2023-10-04 RX ADMIN — AMITRIPTYLINE HYDROCHLORIDE 75 MG: 25 TABLET, FILM COATED ORAL at 20:12

## 2023-10-04 RX ADMIN — METFORMIN HYDROCHLORIDE 500 MG: 500 TABLET, FILM COATED ORAL at 16:28

## 2023-10-04 RX ADMIN — ACETAMINOPHEN 325 MG: 325 TABLET, FILM COATED ORAL at 20:09

## 2023-10-04 RX ADMIN — ACETAMINOPHEN 325 MG: 325 TABLET, FILM COATED ORAL at 16:28

## 2023-10-04 RX ADMIN — LURASIDONE HYDROCHLORIDE 40 MG: 40 TABLET, FILM COATED ORAL at 09:24

## 2023-10-04 RX ADMIN — ASPIRIN 81 MG: 81 TABLET, COATED ORAL at 09:23

## 2023-10-04 RX ADMIN — PILOCARPINE HYDROCHLORIDE 5 MG: 5 TABLET, FILM COATED ORAL at 06:07

## 2023-10-04 RX ADMIN — ATORVASTATIN CALCIUM 40 MG: 40 TABLET, FILM COATED ORAL at 20:10

## 2023-10-04 RX ADMIN — DEXAMETHASONE 4 MG: 4 TABLET ORAL at 21:40

## 2023-10-04 RX ADMIN — HYDROXYCHLOROQUINE SULFATE 200 MG: 200 TABLET ORAL at 09:23

## 2023-10-04 RX ADMIN — PILOCARPINE HYDROCHLORIDE 5 MG: 5 TABLET, FILM COATED ORAL at 12:46

## 2023-10-04 RX ADMIN — APIXABAN 5 MG: 5 TABLET, FILM COATED ORAL at 20:21

## 2023-10-04 RX ADMIN — DEXAMETHASONE 4 MG: 4 TABLET ORAL at 06:07

## 2023-10-04 RX ADMIN — APIXABAN 5 MG: 5 TABLET, FILM COATED ORAL at 09:25

## 2023-10-04 RX ADMIN — ACETAMINOPHEN 325 MG: 325 TABLET, FILM COATED ORAL at 12:46

## 2023-10-04 RX ADMIN — PILOCARPINE HYDROCHLORIDE 5 MG: 5 TABLET, FILM COATED ORAL at 20:10

## 2023-10-04 ASSESSMENT — COGNITIVE AND FUNCTIONAL STATUS - GENERAL
WALKING IN HOSPITAL ROOM: A LITTLE
DAILY ACTIVITIY SCORE: 21
DRESSING REGULAR LOWER BODY CLOTHING: A LITTLE
DAILY ACTIVITIY SCORE: 18
WALKING IN HOSPITAL ROOM: A LITTLE
PERSONAL GROOMING: A LITTLE
CLIMB 3 TO 5 STEPS WITH RAILING: A LOT
TURNING FROM BACK TO SIDE WHILE IN FLAT BAD: A LITTLE
CLIMB 3 TO 5 STEPS WITH RAILING: A LOT
STANDING UP FROM CHAIR USING ARMS: A LITTLE
TOILETING: A LITTLE
DRESSING REGULAR UPPER BODY CLOTHING: A LITTLE
DRESSING REGULAR UPPER BODY CLOTHING: A LITTLE
MOVING TO AND FROM BED TO CHAIR: A LITTLE
EATING MEALS: A LITTLE
WALKING IN HOSPITAL ROOM: A LITTLE
DRESSING REGULAR LOWER BODY CLOTHING: A LITTLE
STANDING UP FROM CHAIR USING ARMS: A LITTLE
DRESSING REGULAR LOWER BODY CLOTHING: A LITTLE
MOBILITY SCORE: 20
HELP NEEDED FOR BATHING: A LITTLE
MOVING TO AND FROM BED TO CHAIR: A LITTLE
DRESSING REGULAR UPPER BODY CLOTHING: A LITTLE
MOBILITY SCORE: 18
MOBILITY SCORE: 18
TURNING FROM BACK TO SIDE WHILE IN FLAT BAD: A LITTLE
MOVING TO AND FROM BED TO CHAIR: A LITTLE
DAILY ACTIVITIY SCORE: 21
CLIMB 3 TO 5 STEPS WITH RAILING: A LOT

## 2023-10-04 ASSESSMENT — PAIN DESCRIPTION - DESCRIPTORS
DESCRIPTORS: SHARP
DESCRIPTORS: SHARP

## 2023-10-04 ASSESSMENT — PAIN SCALES - GENERAL
PAINLEVEL_OUTOF10: 7
PAINLEVEL_OUTOF10: 5 - MODERATE PAIN
PAINLEVEL_OUTOF10: 2
PAINLEVEL_OUTOF10: 6
PAINLEVEL_OUTOF10: 2
PAINLEVEL_OUTOF10: 7
PAINLEVEL_OUTOF10: 0 - NO PAIN

## 2023-10-04 ASSESSMENT — PAIN - FUNCTIONAL ASSESSMENT
PAIN_FUNCTIONAL_ASSESSMENT: 0-10
PAIN_FUNCTIONAL_ASSESSMENT: 0-10

## 2023-10-04 ASSESSMENT — ACTIVITIES OF DAILY LIVING (ADL): BATHING_COMMENTS: COMPLETED PRIOR

## 2023-10-04 ASSESSMENT — PAIN SCALES - PAIN ASSESSMENT IN ADVANCED DEMENTIA (PAINAD): BREATHING: NORMAL

## 2023-10-04 NOTE — PROGRESS NOTES
"Eugenio Bliss is a 67 y.o. male on day 3 of admission presenting with Acute left lumbar radiculopathy.    Subjective   During nursing rounds, RN informed TCSW pt has no new updates. TCSW engaged with pt at bedside to procure SNF choices and witness and complete POA document signing.   Pt provided TCSW with the following SNF choices; [facility], [facility], and [facility]. TCSW submitted referrals via careport for review and acceptance. Pt will not require a precert prior to discharge.     TCSW and pt's RN witnessed signing to Healthcare power of . TCSW provided pt with the original copy and placed a copy in pt's chart.        Objective     Physical Exam    Last Recorded Vitals  Blood pressure 145/70, pulse 67, temperature 36.6 °C (97.9 °F), temperature source Oral, resp. rate 18, height 1.702 m (5' 7\"), weight 94.9 kg (209 lb 3.5 oz), SpO2 97 %.  Intake/Output last 3 Shifts:  I/O last 3 completed shifts:  In: - (0 mL/kg)   Out: 650 (6.8 mL/kg) [Urine:650 (0.2 mL/kg/hr)]  Weight: 94.9 kg     Relevant Results                  Assessment/Plan   Principal Problem:    Acute left lumbar radiculopathy          Marian Don LCSW      "

## 2023-10-04 NOTE — CARE PLAN
Problem: Mobility  Goal: Stair Negotiation  Description: Pt will ascend/descend 3 steps with 2 railing mod independent  Outcome: Not Progressing     Problem: Mobility  Goal: Stair Negotiation  Description: Pt will ascend/descend 3 steps with 2 railing mod independent  Outcome: Not Progressing

## 2023-10-04 NOTE — CARE PLAN
The patient's goals for the shift include  walking to door of room and possible outside of room    The clinical goals for the shift include Pt pain controled    Over the shift, the patient did not make progress toward the following goals. Barriers to progression include pain. Recommendations to address these barriers include doctor is adding new medication.

## 2023-10-04 NOTE — PROGRESS NOTES
Eugenio Bliss is a 67 y.o. male on day 3 of admission presenting with Acute left lumbar radiculopathy.      Subjective   Seen and evaluated PT OT notes reviewed patient will have need for skilled facility for ongoing mobility impairment and endurance issues he is medically stable for discharge when this is obtained       Objective     Last Recorded Vitals  /74 (BP Location: Left arm, Patient Position: Lying)   Pulse 58   Temp 36.6 °C (97.9 °F) (Oral)   Resp 18   Wt 94.9 kg (209 lb 3.5 oz)   SpO2 96%   Intake/Output last 3 Shifts:    Intake/Output Summary (Last 24 hours) at 10/4/2023 1606  Last data filed at 10/4/2023 1100  Gross per 24 hour   Intake --   Output 1700 ml   Net -1700 ml       Admission Weight  Weight: 94.9 kg (209 lb 3.5 oz) (10/01/23 0947)    Daily Weight  10/01/23 : 94.9 kg (209 lb 3.5 oz)    Image Results  MR lumbar spine wo IV contrast  Narrative: Interpreted By:  Arelis Lara,   STUDY:  MR LUMBAR SPINE WO IV CONTRAST; 10/2/2023 9:30 pm      INDICATION:  Signs/Symptoms:Radiculopathy.      COMPARISON:  None      ACCESSION NUMBER(S):  [id number]      ORDERING CLINICIAN:  SIMON HECK      TECHNIQUE:  Multiple, multiplanar sequences of the lumbar spine were obtained.      FINDINGS:  The normal lumbar lordosis is preserved. The conus terminates at  L1-L2. No acute fracture is identified. No vertebral hemangioma is  noted in the L1 vertebral body. No other STIR abnormalities are seen  within the marrow.      The vertebral bodies are grossly preserved. There is grade 1  anterolisthesis of L4 on L5.      L1-L2: No disc herniation. No significant canal or foraminal stenosis.  L2-L3: No disc herniation. No significant canal or foraminal stenosis.  L3-L4: Mild diffuse disc bulge and bilateral facet osteoarthropathy.  No significant canal stenosis. Mild bilateral neural foraminal  narrowing. L4-L5: Mild diffuse disc bulge and bilateral facet  osteoarthropathy. No significant canal  stenosis. Moderate to severe  bilateral neural foraminal narrowing. L5-S1: Mild diffuse disc bulge.  No significant canal or foraminal stenosis.          Impression: Multilevel degenerative changes of the lumbar spine with variable  degree of spinal canal and neural foraminal narrowing as detailed  above, worst at L3-L4 and L4-L5.      MACRO:  None.      Signed by: Arelis Lara 10/2/2023 9:44 PM  Dictation workstation:   [id number]      Physical Exam  Constitutional:       Appearance: Normal appearance.   HENT:      Head: Normocephalic and atraumatic.      Mouth/Throat:      Mouth: Mucous membranes are moist.   Eyes:      Extraocular Movements: Extraocular movements intact.      Pupils: Pupils are equal, round, and reactive to light.   Cardiovascular:      Rate and Rhythm: Normal rate. Rhythm irregular.   Pulmonary:      Effort: Pulmonary effort is normal.      Breath sounds: Normal breath sounds.   Abdominal:      General: Abdomen is flat.      Palpations: Abdomen is soft.   Musculoskeletal:      Cervical back: Normal range of motion and neck supple.   Skin:     General: Skin is warm and dry.      Capillary Refill: Capillary refill takes less than 2 seconds.   Neurological:      General: No focal deficit present.      Mental Status: He is alert.   Psychiatric:         Mood and Affect: Mood normal.         Relevant Results               Assessment/Plan                  Principal Problem:    Acute left lumbar radiculopathy    Clinically improving awaiting skilled nursing facility precertification for discharge              Yaron Yepez DO

## 2023-10-04 NOTE — PROGRESS NOTES
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 Patient is at risk for falls due to weakness and instability L knee.  End of Session Patient Position: Bed, 1 rail up  PT Plan  Inpatient/Swing Bed or Outpatient: Inpatient  PT Plan  Treatment/Interventions: Bed mobility, Transfer training, Gait training, Neuromuscular re-education, Strengthening, Endurance training  PT Plan: Skilled PT  PT Frequency: 4 times per week  PT Discharge Recommendations: Moderate intensity level of continued care  Equipment Recommended upon Discharge: Wheeled walker  PT Recommended Transfer Status: Assist x1  PT - OK to Discharge: Yes      General Visit Information:   PT  Visit  PT Received On: 10/04/23  General  Reason for Referral: impaired mobility, L lumbar radiculopathy  Referred By: Dr. Yepez  Past Medical History Relevant to Rehab: CHF, A-fib, + Eliquis, COPD, DM, Sjorgren's, MAURICE with 3 liters o2 at night, SLE, chronic pain  Prior to Session Communication: Bedside nurse  Patient Position Received: Bed, 2 rail up  General Comment: pleasant, cooperative, motivated for PT    Subjective   Precautions:  Precautions  Hearing/Visual Limitations: GLASSES AT ALL TIMES/mild [place]  Medical Precautions: Fall  precautions  Vital Signs:       Objective   Pain:  Pain Assessment  Pain Assessment: 0-10  Pain Score: 7  Pain Type: Acute pain (sharp)  Pain Location: Knee  Pain Orientation: Left  Pain Radiating Towards:  (L knee down into toes)  Pain Descriptors: Sharp  Response to Interventions: patient reports that he is taking pain medication to assist with pain management  Cognition:  Cognition  Overall Cognitive Status: Within Functional Limits  Orientation Level: Oriented X4  Postural Control:     Extremity/Trunk Assessments:    Activity Tolerance:  Activity Tolerance  Endurance: Tolerates 10 - 20 min exercise with multiple rests (gait activity limited by L LE pain)  Treatments:  Therapeutic Exercise  Therapeutic Exercise Performed: Yes  Therapeutic Exercise Activity 1: supine and seated bilateral LE anti-embolics, LAQ's, marching also, standing toe raises, hip flexion L LE only at RW 1 x 10 reps    Bed Mobility 1  Bed Mobility 1: Supine to sitting  Level of Assistance 1: Close supervision    Ambulation/Gait Training 1  Surface 1: Level tile  Device 1: Rolling walker  Assistance 1: Minimum assistance  Quality of Gait 1:  (antalgic gait on L LE with difficulty with weightbearing due to pain, decreased L step length)  Comments/Distance (ft) 1: 40 ft x 2 with RW with turns  Transfer 1  Technique 1: Sit to stand  Transfer Device 1: Walker  Transfer Level of Assistance 1: Close supervision    Outcome Measures:  [facility] Basic Mobility  Turning from your back to your side while in a flat bed without using bedrails: None  Moving from lying on your back to sitting on the side of a flat bed without using bedrails: A little  Moving to and from bed to chair (including a wheelchair): A little  Standing up from a chair using your arms (e.g. wheelchair or bedside chair): A little  To walk in hospital room: A little  Climbing 3-5 steps with railing: A lot  Basic Mobility - Total Score: 18AMPAC mobility 18/24    Education Documentation  Body  Mechanics, taught by Susan Ruffin, PT at 10/4/2023  2:25 PM.  Learner: Patient  Readiness: Eager  Method: Demonstration  Response: Needs Reinforcement    Mobility Training, taught by Susan Ruffin, PT at 10/4/2023  2:25 PM.  Learner: Patient  Readiness: Eager  Method: Demonstration  Response: Needs Reinforcement    Education Comments  No comments found.               Encounter Problems       Encounter Problems (Active)       Mobility       LTG - Patient will ambulate household distance (Progressing)       Start:  10/02/23    Expected End:  10/16/23       Pt will ambulate household distance independently w/ FWW.         STG - Patient will ambulate (Progressing)       Start:  10/02/23    Expected End:  10/16/23       Pt will ambulate safe household distance independently w/ FWW.            Mobility       ambulation (Progressing)       Start:  10/02/23    Expected End:  10/16/23       150 ft with FWW mod independent         Stair Negotiation (Not Progressing)       Start:  10/02/23    Expected End:  10/16/23       Pt will ascend/descend 3 steps with 2 railing mod independent            Transfers       STG - Transfer from bed to chair (Progressing)       Start:  10/02/23    Expected End:  10/16/23       Pt will demonstrate bed to chair transfer w/ close supervision w/ FWW.         STG - Patient to transfer to and from sit to supine (Progressing)       Start:  10/02/23    Expected End:  10/16/23       Pt will demonstrate supine to sit, sit to supine independently.         STG - Patient will transfer sit to and from stand (Progressing)       Start:  10/02/23    Expected End:  10/16/23       Pt will demon. Sit to stand, stand to sit w/ independently w/ FWW.          STG - Patient will perform toilet transfer (Progressing)       Start:  10/02/23    Expected End:  10/16/23       Pt will demonstrate toilet transfer w/ close supervision and FWW.             Transfers       sit to/from stand (Progressing)       Start:   10/02/23    Expected End:  10/16/23       Sit to/.from stand mod independent         bed mobility (Progressing)       Start:  10/02/23    Expected End:  10/16/23       Pt demonstrated supine to/from sit mod independent

## 2023-10-04 NOTE — PROGRESS NOTES
Occupational Therapy    Occupational Therapy Treatment    Name: Eugenio Bliss  MRN: [id number]  : 1955  Date: 10/04/23  Time Calculation  Start Time: 1445  Stop Time: 1505  Time Calculation (min): 20 min      Subjective   General:  OT Last Visit  OT Received On: 10/04/23  General  Reason for Referral: decreased ability to perform ADLs  Referred By: Dr. Yepez  Past Medical History Relevant to Rehab: CHF, A-fib, + Eliquis, COPD, DM, Sjorgren's, MAURICE with 3 liters o2 at night, SLE, chronic pain  Family/Caregiver Present: No    Prior to Session Communication: Bedside nurse  Patient Position Received: Bed, 2 rail up  Preferred Learning Style: visual, written, verbal  General Comment: pleasant, cooperative, motivated for OT    Vitals:  Vital Signs  SpO2: 95 %  BP: 126/67  MAP (mmHg): 80  BP Location: Right arm  BP Method: Automatic  Patient Position: Sitting  Pain Assessment:  Pain Assessment  Pain Assessment: 0-10  Pain Score: 7  Pain Type: Acute pain  Pain Location: Knee  Pain Orientation: Left  Pain Descriptors: Sharp  Pain Frequency: Intermittent  Clinical Progression: Not changed  Effect of Pain on Daily Activities: decreased ability to complete functional mobility and limits standing tolerance for ADL  Patient's Stated Pain Goal: No pain  Pain Interventions: Repositioned     Objective   Activities of Daily Living:      Grooming  Grooming Comments: Completed prior    UE Bathing  UE Bathing Comments: Completed prior    LE Bathing  LE Bathing Comments: Completed prior    UE Dressing  UE Dressing Comments: Completed prior    Toileting  Toileting Comments: Completed prior    Bed Mobility/Transfers: Bed Mobility  Bed Mobility: Yes  Bed Mobility 1  Bed Mobility 1: Supine to sitting  Level of Assistance 1: Close supervision  Bed Mobility Comments 1: HOB slightly elevated to enhance ease of transition  Bed Mobility 2  Bed Mobility  2: Sitting to supine  Level of Assistance 2: Close supervision  Bed Mobility  Comments 2: HOB slightly elevated, cued pt for body positioning near HOB    Transfers  Transfer: Yes  Transfer 1  Transfer From 1: Bed to, Sit to  Transfer to 1: Stand  Technique 1: Stand to sit  Transfer Device 1: Walker  Transfer Level of Assistance 1: Close supervision  Trials/Comments 1: Pt completes STS from EOB with cues for hand placement and walker mgmt.  Transfers 2  Transfer From 2: Bed to  Transfer to 2: Chair with arms  Technique 2: Stand pivot  Transfer Device 2: Walker  Transfer Level of Assistance 2: Contact guard  Trials/Comments 2: Cued for body positioning and hand placement to improve eccentric control to sitting  Transfers 3  Transfer From 3: Sit to  Transfer to 3: Stand  Technique 3: Sit to stand  Transfer Device 3: Walker  Transfer Level of Assistance 3: Close supervision  Trials/Comments 3: Pt performs STS from bedside chair with instruction on hand placement, walker mgmt, anterior weight shifting.  Pt then challenged to ambulate short household distance, ~15ftx2 with FWW.  Requires cues for walker mgmt, maintaining proximity to device and widening GIOVANNA.  Unsteady at times, intermittently requiring Min A for stability.  One seated rest break provided prior to completing 15ft x2 again.  Antalgic gait noted.    Outcome Measures:  [facility] Daily Activity  Putting on and taking off regular lower body clothing: A little  Bathing (including washing, rinsing, drying): A little  Putting on and taking off regular upper body clothing: A little  Toileting, which includes using toilet, bedpan or urinal: A little  Taking care of personal grooming such as brushing teeth: A little  Eating Meals: A little  Daily Activity - Total Score: 18      Education Documentation  Precautions, taught by Abebe Baldwin OT at 10/4/2023  3:22 PM.  Learner: Patient  Readiness: Acceptance  Method: Explanation  Response: Verbalizes Understanding    Education Comments  No comments found.      Goals:  Encounter Problems        Encounter Problems (Active)       Bathing       Whole body bathing (Progressing)       Start:  10/02/23    Expected End:  10/16/23       Pt will demon. UB/ LB bathing independently w/ AE as needed.            Dressing Upper Extremities       Patient will complete upper body dressing (Progressing)       Start:  10/02/23    Expected End:  10/16/23       Pt will demon. UB dressing independently.            Dressings Lower Extremities       LTG - Lower body dressing (Progressing)       Start:  10/02/23    Expected End:  10/16/23       Pt will demon. LB dressing independently w/ AE as needed.            Grooming       LTG - Patient will complete daily grooming tasks (Progressing)       Start:  10/02/23    Expected End:  10/16/23       Pt will demonstrate grooming tasks independently.         STG - Patient will tolerate standing (Progressing)       Start:  10/02/23    Expected End:  10/16/23       Pt will demonstrate grooming tasks in stance at sink independently.            Mobility       LTG - Patient will ambulate household distance (Progressing)       Start:  10/02/23    Expected End:  10/16/23       Pt will ambulate household distance independently w/ FWW.         STG - Patient will ambulate (Progressing)       Start:  10/02/23    Expected End:  10/16/23       Pt will ambulate safe household distance independently w/ FWW.            Mobility       ambulation (Progressing)       Start:  10/02/23    Expected End:  10/16/23       150 ft with FWW mod independent         Stair Negotiation (Not Progressing)       Start:  10/02/23    Expected End:  10/16/23       Pt will ascend/descend 3 steps with 2 railing mod independent            Toileting       LTG - Patient will complete daily toileting tasks (Progressing)       Start:  10/02/23    Expected End:  10/16/23       Pt will complete daily toileting tasks independently.         STG - Patient will complete clothing management (Progressing)       Start:  10/02/23    Expected  End:  10/16/23       Pt will demonstrate pulling underwear / pants up/ down independently.         STG - Patient completes hygiene (Progressing)       Start:  10/02/23    Expected End:  10/16/23       Pt will complete hygiene tasks independently.            Transfers       STG - Transfer from bed to chair (Progressing)       Start:  10/02/23    Expected End:  10/16/23       Pt will demonstrate bed to chair transfer w/ close supervision w/ FWW.         STG - Patient to transfer to and from sit to supine (Progressing)       Start:  10/02/23    Expected End:  10/16/23       Pt will demonstrate supine to sit, sit to supine independently.         STG - Patient will transfer sit to and from stand (Progressing)       Start:  10/02/23    Expected End:  10/16/23       Pt will demon. Sit to stand, stand to sit w/ independently w/ FWW.          STG - Patient will perform toilet transfer (Progressing)       Start:  10/02/23    Expected End:  10/16/23       Pt will demonstrate toilet transfer w/ close supervision and FWW.             Transfers       sit to/from stand (Progressing)       Start:  10/02/23    Expected End:  10/16/23       Sit to/.from stand mod independent         bed mobility (Progressing)       Start:  10/02/23    Expected End:  10/16/23       Pt demonstrated supine to/from sit mod independent

## 2023-10-04 NOTE — PROGRESS NOTES
TCSW submitted SNF referral to pt;'s choices of [facility] and [facility].  TCSW will continue to follow.

## 2023-10-04 NOTE — CARE PLAN
Problem: Mobility  Goal: LTG - Patient will ambulate household distance  Description: Pt will ambulate household distance independently w/ FWW.  Outcome: Progressing  Goal: STG - Patient will ambulate  Description: Pt will ambulate safe household distance independently w/ FWW.  Outcome: Progressing     Problem: Transfers  Goal: STG - Transfer from bed to chair  Description: Pt will demonstrate bed to chair transfer w/ close supervision w/ FWW.  Outcome: Progressing  Goal: STG - Patient to transfer to and from sit to supine  Description: Pt will demonstrate supine to sit, sit to supine independently.  Outcome: Progressing  Goal: STG - Patient will transfer sit to and from stand  Description: Pt will demon. Sit to stand, stand to sit w/ independently w/ FWW.   Outcome: Progressing

## 2023-10-04 NOTE — CARE PLAN
The patient's goals for the shift include      The clinical goals for the shift include less pain

## 2023-10-04 NOTE — NURSING NOTE
Assumed care of pt. Pt resting in bed quietly. Respirations non labored. Bed in lowest locked position call bell within reach

## 2023-10-05 VITALS
WEIGHT: 209.22 LBS | HEIGHT: 67 IN | TEMPERATURE: 97 F | BODY MASS INDEX: 32.84 KG/M2 | HEART RATE: 70 BPM | RESPIRATION RATE: 19 BRPM | DIASTOLIC BLOOD PRESSURE: 84 MMHG | SYSTOLIC BLOOD PRESSURE: 158 MMHG | OXYGEN SATURATION: 97 %

## 2023-10-05 LAB
GLUCOSE BLD MANUAL STRIP-MCNC: 141 MG/DL (ref 74–99)
GLUCOSE BLD MANUAL STRIP-MCNC: 186 MG/DL (ref 74–99)

## 2023-10-05 PROCEDURE — 2500000004 HC RX 250 GENERAL PHARMACY W/ HCPCS (ALT 636 FOR OP/ED): Performed by: INTERNAL MEDICINE

## 2023-10-05 PROCEDURE — 82947 ASSAY GLUCOSE BLOOD QUANT: CPT

## 2023-10-05 PROCEDURE — 2500000001 HC RX 250 WO HCPCS SELF ADMINISTERED DRUGS (ALT 637 FOR MEDICARE OP): Performed by: INTERNAL MEDICINE

## 2023-10-05 RX ORDER — ALLOPURINOL 100 MG/1
100 TABLET ORAL DAILY
Qty: 30 TABLET | Refills: 3 | Status: ON HOLD | OUTPATIENT
Start: 2023-10-05 | End: 2024-01-09 | Stop reason: SDUPTHER

## 2023-10-05 RX ORDER — DEXAMETHASONE 4 MG/1
6 TABLET ORAL DAILY
Qty: 10 TABLET | Refills: 0 | Status: SHIPPED | OUTPATIENT
Start: 2023-10-05 | End: 2023-11-10 | Stop reason: ALTCHOICE

## 2023-10-05 RX ORDER — ACETAMINOPHEN 325 MG/1
325 TABLET ORAL 4 TIMES DAILY
Qty: 120 TABLET | Refills: 0 | Status: SHIPPED | OUTPATIENT
Start: 2023-10-05 | End: 2024-01-16 | Stop reason: SDUPTHER

## 2023-10-05 RX ORDER — POLYETHYLENE GLYCOL 3350 17 G/17G
17 POWDER, FOR SOLUTION ORAL DAILY
Qty: 30 PACKET | Refills: 0 | Status: SHIPPED | OUTPATIENT
Start: 2023-10-05 | End: 2023-11-10 | Stop reason: ALTCHOICE

## 2023-10-05 RX ORDER — ATORVASTATIN CALCIUM 40 MG/1
40 TABLET, FILM COATED ORAL NIGHTLY
Qty: 30 TABLET | Refills: 3 | Status: SHIPPED | OUTPATIENT
Start: 2023-10-05 | End: 2023-11-10 | Stop reason: SDUPTHER

## 2023-10-05 RX ADMIN — PANTOPRAZOLE SODIUM 20 MG: 20 TABLET, DELAYED RELEASE ORAL at 09:14

## 2023-10-05 RX ADMIN — PILOCARPINE HYDROCHLORIDE 5 MG: 5 TABLET, FILM COATED ORAL at 13:14

## 2023-10-05 RX ADMIN — OXYCODONE HYDROCHLORIDE AND ACETAMINOPHEN 500 MG: 500 TABLET ORAL at 09:14

## 2023-10-05 RX ADMIN — ACETAMINOPHEN 325 MG: 325 TABLET, FILM COATED ORAL at 13:03

## 2023-10-05 RX ADMIN — ACETAMINOPHEN 325 MG: 325 TABLET, FILM COATED ORAL at 06:00

## 2023-10-05 RX ADMIN — LURASIDONE HYDROCHLORIDE 40 MG: 40 TABLET, FILM COATED ORAL at 09:14

## 2023-10-05 RX ADMIN — ALLOPURINOL 100 MG: 100 TABLET ORAL at 09:15

## 2023-10-05 RX ADMIN — ASPIRIN 81 MG: 81 TABLET, COATED ORAL at 09:15

## 2023-10-05 RX ADMIN — BUSPIRONE HYDROCHLORIDE 10 MG: 10 TABLET ORAL at 09:14

## 2023-10-05 RX ADMIN — APIXABAN 5 MG: 5 TABLET, FILM COATED ORAL at 09:15

## 2023-10-05 RX ADMIN — DEXAMETHASONE 4 MG: 4 TABLET ORAL at 06:00

## 2023-10-05 RX ADMIN — MULTIVITAMIN TABLET 1 TABLET: TABLET at 09:00

## 2023-10-05 RX ADMIN — LEVOTHYROXINE SODIUM 50 MCG: 0.05 TABLET ORAL at 09:15

## 2023-10-05 RX ADMIN — DEXAMETHASONE 4 MG: 4 TABLET ORAL at 13:03

## 2023-10-05 RX ADMIN — HYDROXYCHLOROQUINE SULFATE 200 MG: 200 TABLET ORAL at 09:15

## 2023-10-05 RX ADMIN — METOPROLOL 100 MG: 100 TABLET ORAL at 09:14

## 2023-10-05 RX ADMIN — PILOCARPINE HYDROCHLORIDE 5 MG: 5 TABLET, FILM COATED ORAL at 06:16

## 2023-10-05 ASSESSMENT — COGNITIVE AND FUNCTIONAL STATUS - GENERAL
CLIMB 3 TO 5 STEPS WITH RAILING: A LOT
DRESSING REGULAR UPPER BODY CLOTHING: A LITTLE
STANDING UP FROM CHAIR USING ARMS: A LITTLE
MOVING TO AND FROM BED TO CHAIR: A LITTLE
DRESSING REGULAR LOWER BODY CLOTHING: A LITTLE
TURNING FROM BACK TO SIDE WHILE IN FLAT BAD: A LITTLE
MOBILITY SCORE: 18
WALKING IN HOSPITAL ROOM: A LITTLE
DAILY ACTIVITIY SCORE: 21
HELP NEEDED FOR BATHING: A LITTLE

## 2023-10-05 ASSESSMENT — PAIN - FUNCTIONAL ASSESSMENT: PAIN_FUNCTIONAL_ASSESSMENT: 0-10

## 2023-10-05 ASSESSMENT — PAIN SCALES - GENERAL
PAINLEVEL_OUTOF10: 0 - NO PAIN
PAINLEVEL_OUTOF10: 4

## 2023-10-05 NOTE — CARE PLAN
The patient's goals for the shift include Less Pain    The clinical goals for the shift include Less pain    Pt states his pain has improved.

## 2023-10-05 NOTE — CARE PLAN
The patient's goals for the shift include Less Pain    The clinical goals for the shift include Less pain

## 2023-10-05 NOTE — PROGRESS NOTES
"Eugenio Bliss is a 67 y.o. male on day 4 of admission presenting with Acute left lumbar radiculopathy.    Subjective      Pt has been accepted at [facility] and will discharge today. TCSW placed TCT [facility] to arrange transport today for [facility]. TCSW will inform pt and pr's RN.     Objective     Physical Exam    Last Recorded Vitals  Blood pressure 147/82, pulse 60, temperature 36.3 °C (97.3 °F), temperature source Oral, resp. rate 18, height 1.702 m (5' 7\"), weight 94.9 kg (209 lb 3.5 oz), SpO2 97 %.  Intake/Output last 3 Shifts:  I/O last 3 completed shifts:  In: - (0 mL/kg)   Out: 2400 (25.3 mL/kg) [Urine:2400 (0.7 mL/kg/hr)]  Weight: 94.9 kg     Relevant Results                             Assessment/Plan   Principal Problem:    Acute left lumbar radiculopathy             Marian Don LCSW      "

## 2023-10-05 NOTE — DISCHARGE SUMMARY
Discharge Diagnosis  Acute left lumbar radiculopathy    Issues Requiring Follow-Up      Discharge Meds     Your medication list        START taking these medications        Instructions Last Dose Given Next Dose Due   acetaminophen 325 mg tablet  Commonly known as: Tylenol      Take 1 tablet (325 mg) by mouth 4 times a day.       allopurinol 100 mg tablet  Commonly known as: Zyloprim      Take 1 tablet (100 mg) by mouth once daily.       atorvastatin 40 mg tablet  Commonly known as: Lipitor      Take 1 tablet (40 mg) by mouth once daily at bedtime.       colchicine (gout) 0.6 mg tablet      Take 1 tablet (0.6 mg) by mouth once daily for 14 days.       dexAMETHasone 4 mg tablet  Commonly known as: Decadron      Take 1.5 tablets (6 mg) by mouth once daily.       polyethylene glycol 17 gram packet  Commonly known as: Glycolax, Miralax      Take 17 g by mouth once daily.       predniSONE 10 mg tablet  Commonly known as: Deltasone      Take 2 tablets (20 mg) by mouth once daily for 4 days.              CHANGE how you take these medications        Instructions Last Dose Given Next Dose Due   OneTouch Ultra Test strip  Generic drug: blood sugar diagnostic  What changed: Another medication with the same name was removed. Continue taking this medication, and follow the directions you see here.                  CONTINUE taking these medications        Instructions Last Dose Given Next Dose Due   ascorbic acid 500 mg tablet  Commonly known as: Vitamin C           aspirin 81 mg EC tablet           busPIRone 7.5 mg tablet  Commonly known as: Buspar           cholecalciferol 25 MCG (1000 UT) capsule  Commonly known as: Vitamin D-3           cyanocobalamin 1,000 mcg tablet  Commonly known as: Vitamin B-12           Eliquis 5 mg tablet  Generic drug: apixaban      TAKE 1 TABLET BY MOUTH TWICE A DAY       hydroxychloroquine 200 mg tablet  Commonly known as: Plaquenil      TAKE 1 TABLET BY MOUTH EVERY DAY       hydrOXYzine HCL 25 mg  tablet  Commonly known as: Atarax      TAKE 1 TABLET BY MOUTH EVERY DAY AT BEDTIME AS NEEDED       ipratropium-albuteroL 0.5-2.5 mg/3 mL nebulizer solution  Commonly known as: Duo-Neb      TAKE 3 ML BY NEBULIZATION EVERY 6 HOURS IF NEEDED FOR WHEEZING OR SHORTNESS OF BREATH.       levothyroxine 50 mcg tablet  Commonly known as: Synthroid, Levoxyl      TAKE 1 TABLET BY MOUTH EVERY DAY       losartan 50 mg tablet  Commonly known as: Cozaar      TAKE 1.5 TABLETS BY MOUTH EVERY DAY       lurasidone 40 mg tablet  Commonly known as: Latuda           metFORMIN 500 mg tablet  Commonly known as: Glucophage           metoprolol tartrate 100 mg tablet  Commonly known as: Lopressor      TAKE 1 TABLET BY MOUTH EVERY MORNING AND AT BEDTIME       multivitamin tablet           nebulizer accessories kit      1 kit every 4 hours if needed (wheezing).       pantoprazole 20 mg EC tablet  Commonly known as: ProtoNix      TAKE 1 TABLET BY MOUTH EVERY DAY       vilazodone 40 mg tablet  Commonly known as: Viibryd                  STOP taking these medications      amitriptyline 75 mg tablet  Commonly known as: Elavil        cyclobenzaprine 5 mg tablet  Commonly known as: Flexeril        nitroglycerin 0.4 mg SL tablet  Commonly known as: Nitrostat        pilocarpine 5 mg tablet  Commonly known as: Salagen        traZODone 300 mg tablet  Commonly known as: Desyrel               ASK your doctor about these medications        Instructions Last Dose Given Next Dose Due   oxyCODONE-acetaminophen 5-325 mg tablet  Commonly known as: Percocet  Ask about: Should I take this medication?      Take 1 tablet by mouth every 6 hours if needed for severe pain (7 - 10) for up to 3 days.                 Where to Get Your Medications        These medications were sent to [facility]/pharmacy #[id number] - [place], [place] - [address] AT [address], [place] [id number]      Phone: [phone]   acetaminophen 325 mg tablet  allopurinol 100  mg tablet  atorvastatin 40 mg tablet  colchicine (gout) 0.6 mg tablet  dexAMETHasone 4 mg tablet  oxyCODONE-acetaminophen 5-325 mg tablet  polyethylene glycol 17 gram packet  predniSONE 10 mg tablet         Test Results Pending At Discharge  Pending Labs       No current pending labs.            Hospital Course   The 7-year-old  male presents with inability to ambulate due to severe left leg pain secondary to lumbar radiculopathy L3-4-5.  He was initiated on pain medications steroids underwent imaging of his brain which showed atrophy no acute stroke chest x-ray no pneumonia CT scan of the lumbar spine showing degenerative arthritis MRI of the lumbar spine showing moderate to moderately severe neuroforaminal stenoses is pain level improved his mobility was improving he required skilled nursing facility for ongoing issues with endurance and weakness was evaluated by physical and Occupational Therapy and was medically stable for discharge to skilled facility ongoing rehabilitation    Pertinent Physical Exam At Time of Discharge  Physical Exam  Constitutional:       Appearance: Normal appearance.   HENT:      Head: Normocephalic and atraumatic.      Mouth/Throat:      Mouth: Mucous membranes are moist.   Eyes:      Extraocular Movements: Extraocular movements intact.      Pupils: Pupils are equal, round, and reactive to light.   Cardiovascular:      Rate and Rhythm: Normal rate and regular rhythm.   Pulmonary:      Effort: Pulmonary effort is normal.      Breath sounds: Normal breath sounds.   Abdominal:      General: Abdomen is flat.      Palpations: Abdomen is soft.   Musculoskeletal:         General: Normal range of motion.      Cervical back: Normal range of motion and neck supple.   Skin:     General: Skin is warm and dry.      Capillary Refill: Capillary refill takes less than 2 seconds.   Neurological:      General: No focal deficit present.      Mental Status: He is alert. Mental status is at baseline.    Psychiatric:         Mood and Affect: Mood normal.         Outpatient Follow-Up  No future appointments.      Yaron Yepez DO

## 2023-10-16 ENCOUNTER — APPOINTMENT (OUTPATIENT)
Dept: PRIMARY CARE | Facility: CLINIC | Age: 68
End: 2023-10-16
Payer: MEDICARE

## 2023-10-17 ENCOUNTER — APPOINTMENT (OUTPATIENT)
Dept: PRIMARY CARE | Facility: CLINIC | Age: 68
End: 2023-10-17
Payer: MEDICARE

## 2023-10-23 ENCOUNTER — TELEPHONE (OUTPATIENT)
Dept: PRIMARY CARE | Facility: CLINIC | Age: 68
End: 2023-10-23
Payer: MEDICARE

## 2023-10-23 NOTE — TELEPHONE ENCOUNTER
Called Berenice from home health and said provider on call would like him seen in office before she will sign any home health orders.  Next appointment is not until November 13th per MA.  Berenice said without permission they are unable to go to home and asked if we were ok with him waiting that long. I said I would check with provider and call back

## 2023-10-23 NOTE — TELEPHONE ENCOUNTER
Berenice from home health called to get verbal orders from provider for Occupational, Physical and skilled nursing care at home for Eugenio.  He is leaving Rehab facility on 10-25-23

## 2023-10-23 NOTE — TELEPHONE ENCOUNTER
Patient was seen this year in May and has appointment scheduled with [facility] in December.  They said they just need a verbal order. Would Dr Hicks do that?

## 2023-10-25 DIAGNOSIS — M06.9 RHEUMATOID ARTHRITIS, UNSPECIFIED (MULTI): ICD-10-CM

## 2023-10-25 RX ORDER — LOSARTAN POTASSIUM 50 MG/1
75 TABLET ORAL DAILY
Qty: 135 TABLET | Refills: 1 | Status: SHIPPED | OUTPATIENT
Start: 2023-10-25 | End: 2024-02-20 | Stop reason: SDUPTHER

## 2023-10-27 NOTE — TELEPHONE ENCOUNTER
Reviewed notes and okayed the home care for the pt and set an appointment for my first avail which is within 30days.

## 2023-11-07 ENCOUNTER — HOSPITAL ENCOUNTER (EMERGENCY)
Facility: HOSPITAL | Age: 68
Discharge: HOME | End: 2023-11-07
Attending: EMERGENCY MEDICINE
Payer: MEDICARE

## 2023-11-07 VITALS
HEIGHT: 67 IN | HEART RATE: 91 BPM | OXYGEN SATURATION: 95 % | RESPIRATION RATE: 20 BRPM | WEIGHT: 220 LBS | TEMPERATURE: 97.8 F | SYSTOLIC BLOOD PRESSURE: 119 MMHG | BODY MASS INDEX: 34.53 KG/M2 | DIASTOLIC BLOOD PRESSURE: 70 MMHG

## 2023-11-07 DIAGNOSIS — N30.00 ACUTE CYSTITIS WITHOUT HEMATURIA: Primary | ICD-10-CM

## 2023-11-07 LAB
APPEARANCE UR: ABNORMAL
BACTERIA #/AREA URNS AUTO: ABNORMAL /HPF
BILIRUB UR STRIP.AUTO-MCNC: NEGATIVE MG/DL
COLOR UR: YELLOW
GLUCOSE UR STRIP.AUTO-MCNC: NORMAL MG/DL
HYALINE CASTS #/AREA URNS AUTO: ABNORMAL /LPF
KETONES UR STRIP.AUTO-MCNC: NEGATIVE MG/DL
LEUKOCYTE ESTERASE UR QL STRIP.AUTO: ABNORMAL
MUCOUS THREADS #/AREA URNS AUTO: ABNORMAL /LPF
NITRITE UR QL STRIP.AUTO: NEGATIVE
PH UR STRIP.AUTO: 5.5 [PH]
PROT UR STRIP.AUTO-MCNC: ABNORMAL MG/DL
RBC # UR STRIP.AUTO: ABNORMAL /UL
RBC #/AREA URNS AUTO: >20 /HPF
SP GR UR STRIP.AUTO: 1.02
UROBILINOGEN UR STRIP.AUTO-MCNC: NORMAL MG/DL
WBC #/AREA URNS AUTO: >50 /HPF
WBC CLUMPS #/AREA URNS AUTO: ABNORMAL /HPF

## 2023-11-07 PROCEDURE — 99283 EMERGENCY DEPT VISIT LOW MDM: CPT

## 2023-11-07 PROCEDURE — 81001 URINALYSIS AUTO W/SCOPE: CPT | Performed by: EMERGENCY MEDICINE

## 2023-11-07 PROCEDURE — 99285 EMERGENCY DEPT VISIT HI MDM: CPT | Performed by: EMERGENCY MEDICINE

## 2023-11-07 PROCEDURE — 2500000004 HC RX 250 GENERAL PHARMACY W/ HCPCS (ALT 636 FOR OP/ED): Performed by: EMERGENCY MEDICINE

## 2023-11-07 RX ORDER — SULFAMETHOXAZOLE AND TRIMETHOPRIM 800; 160 MG/1; MG/1
1 TABLET ORAL EVERY 12 HOURS
Qty: 10 TABLET | Refills: 0 | Status: SHIPPED | OUTPATIENT
Start: 2023-11-07 | End: 2023-11-10 | Stop reason: ALTCHOICE

## 2023-11-07 RX ORDER — SULFAMETHOXAZOLE AND TRIMETHOPRIM 800; 160 MG/1; MG/1
1 TABLET ORAL ONCE
Status: COMPLETED | OUTPATIENT
Start: 2023-11-07 | End: 2023-11-07

## 2023-11-07 RX ADMIN — SULFAMETHOXAZOLE AND TRIMETHOPRIM 1 TABLET: 800; 160 TABLET ORAL at 22:48

## 2023-11-08 NOTE — ED TRIAGE NOTES
Patient arrives by EMS from home with complaints of UTI symptoms for the past 2 days. He reports burning and difficulty with urination but denies blood in his urine. He reports a history of UTIs and diabetes. Patient is on 2.5L NC at home at night only.
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
## 2023-11-09 ENCOUNTER — TELEPHONE (OUTPATIENT)
Dept: PRIMARY CARE | Facility: CLINIC | Age: 68
End: 2023-11-09
Payer: MEDICARE

## 2023-11-09 NOTE — TELEPHONE ENCOUNTER
Spoke with emely, pt opted to present to ED last evening, was positive for UTI, we will still see him tomorrow.

## 2023-11-10 ENCOUNTER — OFFICE VISIT (OUTPATIENT)
Dept: PRIMARY CARE | Facility: CLINIC | Age: 68
End: 2023-11-10
Payer: MEDICARE

## 2023-11-10 VITALS
OXYGEN SATURATION: 97 % | WEIGHT: 210 LBS | HEIGHT: 67 IN | HEART RATE: 76 BPM | BODY MASS INDEX: 32.96 KG/M2 | SYSTOLIC BLOOD PRESSURE: 93 MMHG | DIASTOLIC BLOOD PRESSURE: 56 MMHG

## 2023-11-10 DIAGNOSIS — F33.9 RECURRENT MAJOR DEPRESSIVE DISORDER, REMISSION STATUS UNSPECIFIED (CMS-HCC): ICD-10-CM

## 2023-11-10 DIAGNOSIS — M35.00 SJOGREN'S SYNDROME, WITH UNSPECIFIED ORGAN INVOLVEMENT (MULTI): ICD-10-CM

## 2023-11-10 DIAGNOSIS — E55.9 VITAMIN D DEFICIENCY: ICD-10-CM

## 2023-11-10 DIAGNOSIS — G47.00 INSOMNIA, UNSPECIFIED: ICD-10-CM

## 2023-11-10 DIAGNOSIS — Z09 HOSPITAL DISCHARGE FOLLOW-UP: ICD-10-CM

## 2023-11-10 DIAGNOSIS — M25.571 BILATERAL ANKLE PAIN, UNSPECIFIED CHRONICITY: ICD-10-CM

## 2023-11-10 DIAGNOSIS — E53.8 CYANOCOBALAMIN DEFICIENCY: ICD-10-CM

## 2023-11-10 DIAGNOSIS — K21.9 GASTROESOPHAGEAL REFLUX DISEASE WITHOUT ESOPHAGITIS: Primary | ICD-10-CM

## 2023-11-10 DIAGNOSIS — M25.572 BILATERAL ANKLE PAIN, UNSPECIFIED CHRONICITY: ICD-10-CM

## 2023-11-10 DIAGNOSIS — M54.50 LUMBAR PAIN: ICD-10-CM

## 2023-11-10 DIAGNOSIS — F41.9 ANXIETY DISORDER, UNSPECIFIED TYPE: ICD-10-CM

## 2023-11-10 DIAGNOSIS — I48.91 ATRIAL FIBRILLATION, UNSPECIFIED TYPE (MULTI): ICD-10-CM

## 2023-11-10 DIAGNOSIS — M10.9 ACUTE GOUT OF LEFT ANKLE, UNSPECIFIED CAUSE: ICD-10-CM

## 2023-11-10 PROCEDURE — 4010F ACE/ARB THERAPY RXD/TAKEN: CPT

## 2023-11-10 PROCEDURE — 3078F DIAST BP <80 MM HG: CPT

## 2023-11-10 PROCEDURE — 1126F AMNT PAIN NOTED NONE PRSNT: CPT

## 2023-11-10 PROCEDURE — 1036F TOBACCO NON-USER: CPT

## 2023-11-10 PROCEDURE — 99214 OFFICE O/P EST MOD 30 MIN: CPT

## 2023-11-10 PROCEDURE — 3074F SYST BP LT 130 MM HG: CPT

## 2023-11-10 PROCEDURE — 1159F MED LIST DOCD IN RCRD: CPT

## 2023-11-10 PROCEDURE — 1160F RVW MEDS BY RX/DR IN RCRD: CPT

## 2023-11-10 RX ORDER — VIT C/E/ZN/COPPR/LUTEIN/ZEAXAN 250MG-90MG
25 CAPSULE ORAL DAILY
Qty: 30 CAPSULE | Refills: 11 | Status: SHIPPED | OUTPATIENT
Start: 2023-11-10 | End: 2024-01-16 | Stop reason: ALTCHOICE

## 2023-11-10 RX ORDER — LANOLIN ALCOHOL/MO/W.PET/CERES
1000 CREAM (GRAM) TOPICAL DAILY
Qty: 30 TABLET | Refills: 11 | Status: SHIPPED | OUTPATIENT
Start: 2023-11-10

## 2023-11-10 RX ORDER — LURASIDONE HYDROCHLORIDE 40 MG/1
40 TABLET, FILM COATED ORAL DAILY
Qty: 30 TABLET | Refills: 11 | Status: SHIPPED | OUTPATIENT
Start: 2023-11-10 | End: 2024-11-09

## 2023-11-10 RX ORDER — ATORVASTATIN CALCIUM 40 MG/1
40 TABLET, FILM COATED ORAL NIGHTLY
Qty: 30 TABLET | Refills: 3 | Status: SHIPPED | OUTPATIENT
Start: 2023-11-10 | End: 2023-11-29 | Stop reason: WASHOUT

## 2023-11-10 RX ORDER — TRAZODONE HYDROCHLORIDE 100 MG/1
100 TABLET ORAL NIGHTLY
COMMUNITY
Start: 2023-10-25

## 2023-11-10 RX ORDER — HYDROXYZINE HYDROCHLORIDE 25 MG/1
TABLET, FILM COATED ORAL
Qty: 90 TABLET | Refills: 1 | Status: SHIPPED | OUTPATIENT
Start: 2023-11-10

## 2023-11-10 RX ORDER — GABAPENTIN 100 MG/1
100 CAPSULE ORAL 3 TIMES DAILY
Qty: 90 CAPSULE | Refills: 2 | Status: SHIPPED | OUTPATIENT
Start: 2023-11-10 | End: 2024-02-08

## 2023-11-10 RX ORDER — METOPROLOL TARTRATE 100 MG/1
100 TABLET ORAL 2 TIMES DAILY
Qty: 60 TABLET | Refills: 11 | Status: SHIPPED | OUTPATIENT
Start: 2023-11-10 | End: 2024-01-09 | Stop reason: HOSPADM

## 2023-11-10 ASSESSMENT — PATIENT HEALTH QUESTIONNAIRE - PHQ9
3. TROUBLE FALLING OR STAYING ASLEEP OR SLEEPING TOO MUCH: NEARLY EVERY DAY
7. TROUBLE CONCENTRATING ON THINGS, SUCH AS READING THE NEWSPAPER OR WATCHING TELEVISION: NEARLY EVERY DAY
5. POOR APPETITE OR OVEREATING: NOT AT ALL
8. MOVING OR SPEAKING SO SLOWLY THAT OTHER PEOPLE COULD HAVE NOTICED. OR THE OPPOSITE, BEING SO FIGETY OR RESTLESS THAT YOU HAVE BEEN MOVING AROUND A LOT MORE THAN USUAL: NEARLY EVERY DAY
1. LITTLE INTEREST OR PLEASURE IN DOING THINGS: NEARLY EVERY DAY
4. FEELING TIRED OR HAVING LITTLE ENERGY: NEARLY EVERY DAY
6. FEELING BAD ABOUT YOURSELF - OR THAT YOU ARE A FAILURE OR HAVE LET YOURSELF OR YOUR FAMILY DOWN: NOT AT ALL
SUM OF ALL RESPONSES TO PHQ QUESTIONS 1-9: 18
2. FEELING DOWN, DEPRESSED OR HOPELESS: NEARLY EVERY DAY
SUM OF ALL RESPONSES TO PHQ9 QUESTIONS 1 AND 2: 6
10. IF YOU CHECKED OFF ANY PROBLEMS, HOW DIFFICULT HAVE THESE PROBLEMS MADE IT FOR YOU TO DO YOUR WORK, TAKE CARE OF THINGS AT HOME, OR GET ALONG WITH OTHER PEOPLE: SOMEWHAT DIFFICULT
9. THOUGHTS THAT YOU WOULD BE BETTER OFF DEAD, OR OF HURTING YOURSELF: NOT AT ALL

## 2023-11-10 ASSESSMENT — ENCOUNTER SYMPTOMS
DYSURIA: 1
FEVER: 0
APPETITE CHANGE: 0
VOICE CHANGE: 0
ARTHRALGIAS: 1
BACK PAIN: 1
FATIGUE: 0
JOINT SWELLING: 0
PAIN: 1
TROUBLE SWALLOWING: 0
MYALGIAS: 0
UNEXPECTED WEIGHT CHANGE: 0
HEMATURIA: 0
ACTIVITY CHANGE: 0

## 2023-11-10 NOTE — PROGRESS NOTES
"Subjective   Patient ID: Eugenio Bliss is a 67 y.o. male who presents for Hospital Follow-up (Pain in feet and ankles).    Pt recently hospitalized to rule out stroke, was found to be severe lumbar pain discharge to rehab, transitioned to his home on oct 25th for home PT and OT with nursing coming to the house 1 day per week, presents today with complaint of continued pain in his feet has not had either PT or OT at his home as of yet.   Of note also 3 days ago when nursing was at him home they felt he was having an acute change and they opted to take him to ER where it was found he has a UTI in on day 3 of antibiotics. Advised him if urine is not better by Monday to follow-up with the office.     Pt also asked for refills on some long standing medications for htn, afib, depression, gerd, and vitamin deficiencies.       Pain  This is a chronic problem. The current episode started 1 to 4 weeks ago. The problem occurs constantly. The problem is unchanged. The pain occurs in the context of a recent illness and recent physical stress. Pain location: bilateral feet and ankles. The pain is medium. The symptoms are aggravated by any movement. Associated symptoms include dysuria. Pertinent negatives include no fatigue, fever or joint swelling. The treatment provided no relief. Behavior: figgity. His past medical history is significant for chronic back pain.      Pain rated 8/10  Review of Systems   Constitutional:  Negative for activity change, appetite change, fatigue, fever and unexpected weight change.   HENT:  Negative for trouble swallowing and voice change.    Genitourinary:  Positive for dysuria and urgency. Negative for decreased urine volume and hematuria.   Musculoskeletal:  Positive for arthralgias, back pain and gait problem. Negative for joint swelling and myalgias.       Objective   BP 93/56   Pulse 76   Ht 1.702 m (5' 7\")   Wt 95.3 kg (210 lb)   SpO2 97%   BMI 32.89 kg/m²     Physical Exam  Vitals and " nursing note reviewed.   Constitutional:       General: He is not in acute distress.     Appearance: He is ill-appearing. He is not toxic-appearing or diaphoretic.   Cardiovascular:      Pulses: Normal pulses.      Heart sounds: Normal heart sounds.   Pulmonary:      Effort: Pulmonary effort is normal.      Breath sounds: Normal breath sounds.   Musculoskeletal:         General: No swelling or signs of injury. Normal range of motion.   Neurological:      Motor: No weakness.      Gait: Gait normal.   Psychiatric:         Mood and Affect: Mood normal.         Behavior: Behavior normal.         Thought Content: Thought content normal.         Judgment: Judgment normal.         Assessment/Plan   Problem List Items Addressed This Visit             ICD-10-CM    Anxiety disorder F41.9    Relevant Medications    lurasidone (Latuda) 40 mg tablet    Depression, major, recurrent (CMS/HCC) F33.9    Relevant Medications    lurasidone (Latuda) 40 mg tablet    Esophageal reflux - Primary K21.9    Hospital discharge follow-up Z09    Afib (CMS/HCC) I48.91    Relevant Medications    metoprolol tartrate (Lopressor) 100 mg tablet    Sjogrens syndrome (CMS/[facility]) M35.00     Other Visit Diagnoses         Codes    Acute gout of left ankle, unspecified cause     M10.9    Relevant Medications    atorvastatin (Lipitor) 40 mg tablet    Insomnia, unspecified     G47.00    Relevant Medications    hydrOXYzine HCL (Atarax) 25 mg tablet    Vitamin D deficiency     E55.9    Relevant Medications    cholecalciferol (Vitamin D-3) 25 MCG (1000 UT) capsule    Lumbar pain     M54.50    Relevant Orders    Referral to Pain Medicine    Bilateral ankle pain, unspecified chronicity     M25.571, M25.572    Relevant Medications    gabapentin (Neurontin) 100 mg capsule    Other Relevant Orders    Referral to Pain Medicine    Cyanocobalamin deficiency     E53.8    Relevant Medications    cyanocobalamin (Vitamin B-12) 1,000 mcg tablet                 Followup in 3  months

## 2023-11-10 NOTE — PATIENT INSTRUCTIONS
To schedule with pain management please contact  [facility] Pain management: Dr. Taylor, Dr. Coulter and Dr. Mancia  #[phone] [address], [address]  [place] [address]

## 2023-11-28 ENCOUNTER — TELEPHONE (OUTPATIENT)
Dept: PRIMARY CARE | Facility: CLINIC | Age: 68
End: 2023-11-28
Payer: MEDICARE

## 2023-11-28 DIAGNOSIS — R30.0 DYSURIA: Primary | ICD-10-CM

## 2023-11-28 NOTE — TELEPHONE ENCOUNTER
Pt called stating he is still having symptoms of a UTI and would like another Rx sent in.    Last office visit: 11/10/2023  Next office visit: 2/13/2024

## 2023-11-29 ENCOUNTER — OFFICE VISIT (OUTPATIENT)
Dept: PAIN MEDICINE | Facility: CLINIC | Age: 68
End: 2023-11-29
Payer: MEDICARE

## 2023-11-29 ENCOUNTER — LAB (OUTPATIENT)
Dept: LAB | Facility: LAB | Age: 68
End: 2023-11-29
Payer: MEDICARE

## 2023-11-29 VITALS — HEART RATE: 86 BPM | DIASTOLIC BLOOD PRESSURE: 80 MMHG | SYSTOLIC BLOOD PRESSURE: 124 MMHG

## 2023-11-29 DIAGNOSIS — M48.062 SPINAL STENOSIS OF LUMBAR REGION WITH NEUROGENIC CLAUDICATION: Primary | ICD-10-CM

## 2023-11-29 DIAGNOSIS — G89.29 CHRONIC BILATERAL LOW BACK PAIN WITH BILATERAL SCIATICA: ICD-10-CM

## 2023-11-29 DIAGNOSIS — M54.42 CHRONIC BILATERAL LOW BACK PAIN WITH BILATERAL SCIATICA: ICD-10-CM

## 2023-11-29 DIAGNOSIS — R30.0 DYSURIA: ICD-10-CM

## 2023-11-29 DIAGNOSIS — M54.41 CHRONIC BILATERAL LOW BACK PAIN WITH BILATERAL SCIATICA: ICD-10-CM

## 2023-11-29 DIAGNOSIS — Z79.01 CURRENT USE OF LONG TERM ANTICOAGULATION: ICD-10-CM

## 2023-11-29 LAB
APPEARANCE UR: ABNORMAL
BACTERIA #/AREA URNS AUTO: ABNORMAL /HPF
BILIRUB UR STRIP.AUTO-MCNC: NEGATIVE MG/DL
COLOR UR: ABNORMAL
GLUCOSE UR STRIP.AUTO-MCNC: NEGATIVE MG/DL
KETONES UR STRIP.AUTO-MCNC: NEGATIVE MG/DL
LEUKOCYTE ESTERASE UR QL STRIP.AUTO: ABNORMAL
MUCOUS THREADS #/AREA URNS AUTO: ABNORMAL /LPF
NITRITE UR QL STRIP.AUTO: NEGATIVE
PH UR STRIP.AUTO: 5 [PH]
PROT UR STRIP.AUTO-MCNC: ABNORMAL MG/DL
RBC # UR STRIP.AUTO: ABNORMAL /UL
RBC #/AREA URNS AUTO: ABNORMAL /HPF
SP GR UR STRIP.AUTO: 1.02
UROBILINOGEN UR STRIP.AUTO-MCNC: 4 MG/DL
WBC #/AREA URNS AUTO: >50 /HPF
WBC CLUMPS #/AREA URNS AUTO: ABNORMAL /HPF

## 2023-11-29 PROCEDURE — 81001 URINALYSIS AUTO W/SCOPE: CPT

## 2023-11-29 PROCEDURE — 4010F ACE/ARB THERAPY RXD/TAKEN: CPT | Performed by: ANESTHESIOLOGY

## 2023-11-29 PROCEDURE — 3079F DIAST BP 80-89 MM HG: CPT | Performed by: ANESTHESIOLOGY

## 2023-11-29 PROCEDURE — 99204 OFFICE O/P NEW MOD 45 MIN: CPT | Performed by: ANESTHESIOLOGY

## 2023-11-29 PROCEDURE — 1126F AMNT PAIN NOTED NONE PRSNT: CPT | Performed by: ANESTHESIOLOGY

## 2023-11-29 PROCEDURE — 1160F RVW MEDS BY RX/DR IN RCRD: CPT | Performed by: ANESTHESIOLOGY

## 2023-11-29 PROCEDURE — 99214 OFFICE O/P EST MOD 30 MIN: CPT | Performed by: ANESTHESIOLOGY

## 2023-11-29 PROCEDURE — 87086 URINE CULTURE/COLONY COUNT: CPT

## 2023-11-29 PROCEDURE — 87186 SC STD MICRODIL/AGAR DIL: CPT

## 2023-11-29 PROCEDURE — 1159F MED LIST DOCD IN RCRD: CPT | Performed by: ANESTHESIOLOGY

## 2023-11-29 PROCEDURE — 1036F TOBACCO NON-USER: CPT | Performed by: ANESTHESIOLOGY

## 2023-11-29 PROCEDURE — 3074F SYST BP LT 130 MM HG: CPT | Performed by: ANESTHESIOLOGY

## 2023-11-29 RX ORDER — NITROFURANTOIN 25; 75 MG/1; MG/1
CAPSULE ORAL
Qty: 10 CAPSULE | Refills: 0 | Status: SHIPPED | OUTPATIENT
Start: 2023-11-29 | End: 2024-01-09 | Stop reason: HOSPADM

## 2023-11-29 ASSESSMENT — ENCOUNTER SYMPTOMS
WEAKNESS: 1
HEMATOLOGIC/LYMPHATIC NEGATIVE: 1
GASTROINTESTINAL NEGATIVE: 1
PSYCHIATRIC NEGATIVE: 1
CONSTITUTIONAL NEGATIVE: 1
CARDIOVASCULAR NEGATIVE: 1
BACK PAIN: 1
RESPIRATORY NEGATIVE: 1
NUMBNESS: 1
EYES NEGATIVE: 1
ENDOCRINE NEGATIVE: 1

## 2023-11-29 ASSESSMENT — PAIN SCALES - GENERAL: PAINLEVEL_OUTOF10: 10 - WORST POSSIBLE PAIN

## 2023-11-29 ASSESSMENT — PAIN - FUNCTIONAL ASSESSMENT: PAIN_FUNCTIONAL_ASSESSMENT: 0-10
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
## 2023-12-01 ENCOUNTER — TELEPHONE (OUTPATIENT)
Dept: PRIMARY CARE | Facility: CLINIC | Age: 68
End: 2023-12-01
Payer: MEDICARE

## 2023-12-01 DIAGNOSIS — N39.498 OTHER URINARY INCONTINENCE: Primary | ICD-10-CM

## 2023-12-01 LAB — BACTERIA UR CULT: ABNORMAL

## 2023-12-01 RX ORDER — SULFAMETHOXAZOLE AND TRIMETHOPRIM 800; 160 MG/1; MG/1
1 TABLET ORAL 2 TIMES DAILY
Qty: 20 TABLET | Refills: 0 | Status: SHIPPED | OUTPATIENT
Start: 2023-12-01 | End: 2023-12-11

## 2023-12-01 NOTE — TELEPHONE ENCOUNTER
Pt called in requesting for an order of adult diapers to be sent for [facility] size Large. As pt can't control bladder since UTI.    Last office visit: 11/10/2023  Next office visit:2/13/2023

## 2023-12-06 ENCOUNTER — TELEPHONE (OUTPATIENT)
Dept: PAIN MEDICINE | Facility: CLINIC | Age: 68
End: 2023-12-06
Payer: MEDICARE

## 2023-12-12 ENCOUNTER — TELEPHONE (OUTPATIENT)
Dept: PAIN MEDICINE | Facility: CLINIC | Age: 68
End: 2023-12-12

## 2023-12-12 ENCOUNTER — APPOINTMENT (OUTPATIENT)
Dept: PRIMARY CARE | Facility: CLINIC | Age: 68
End: 2023-12-12
Payer: MEDICARE

## 2023-12-12 NOTE — TELEPHONE ENCOUNTER
Received and scanned the coag clearance for Eugenio Bliss to the chart from Tracee Sullivan CNP covering for Idania palmer

## 2024-01-04 ENCOUNTER — HOSPITAL ENCOUNTER (OUTPATIENT)
Dept: GASTROENTEROLOGY | Facility: HOSPITAL | Age: 69
Discharge: HOME | End: 2024-01-04
Payer: MEDICARE

## 2024-01-04 ENCOUNTER — APPOINTMENT (OUTPATIENT)
Dept: OPERATING ROOM | Facility: HOSPITAL | Age: 69
End: 2024-01-04
Payer: MEDICARE

## 2024-01-04 VITALS
WEIGHT: 210 LBS | HEART RATE: 102 BPM | RESPIRATION RATE: 16 BRPM | DIASTOLIC BLOOD PRESSURE: 88 MMHG | TEMPERATURE: 96.8 F | OXYGEN SATURATION: 98 % | SYSTOLIC BLOOD PRESSURE: 133 MMHG | HEIGHT: 67 IN | BODY MASS INDEX: 32.96 KG/M2

## 2024-01-04 DIAGNOSIS — M48.062 SPINAL STENOSIS OF LUMBAR REGION WITH NEUROGENIC CLAUDICATION: ICD-10-CM

## 2024-01-04 ASSESSMENT — COLUMBIA-SUICIDE SEVERITY RATING SCALE - C-SSRS
1. IN THE PAST MONTH, HAVE YOU WISHED YOU WERE DEAD OR WISHED YOU COULD GO TO SLEEP AND NOT WAKE UP?: NO
6. HAVE YOU EVER DONE ANYTHING, STARTED TO DO ANYTHING, OR PREPARED TO DO ANYTHING TO END YOUR LIFE?: NO
2. HAVE YOU ACTUALLY HAD ANY THOUGHTS OF KILLING YOURSELF?: NO

## 2024-01-04 ASSESSMENT — PAIN - FUNCTIONAL ASSESSMENT: PAIN_FUNCTIONAL_ASSESSMENT: 0-10

## 2024-01-04 ASSESSMENT — PAIN DESCRIPTION - DESCRIPTORS: DESCRIPTORS: ACHING;DULL

## 2024-01-05 ENCOUNTER — APPOINTMENT (OUTPATIENT)
Dept: CARDIOLOGY | Facility: HOSPITAL | Age: 69
DRG: 554 | End: 2024-01-05
Payer: MEDICARE

## 2024-01-05 ENCOUNTER — APPOINTMENT (OUTPATIENT)
Dept: RADIOLOGY | Facility: HOSPITAL | Age: 69
DRG: 554 | End: 2024-01-05
Payer: MEDICARE

## 2024-01-05 ENCOUNTER — HOSPITAL ENCOUNTER (INPATIENT)
Facility: HOSPITAL | Age: 69
LOS: 1 days | Discharge: HOME | DRG: 554 | End: 2024-01-09
Attending: STUDENT IN AN ORGANIZED HEALTH CARE EDUCATION/TRAINING PROGRAM | Admitting: INTERNAL MEDICINE
Payer: MEDICARE

## 2024-01-05 DIAGNOSIS — M10.9 ACUTE GOUT OF LEFT ANKLE, UNSPECIFIED CAUSE: ICD-10-CM

## 2024-01-05 DIAGNOSIS — R53.1 LEFT-SIDED WEAKNESS: Primary | ICD-10-CM

## 2024-01-05 DIAGNOSIS — R20.0 NUMBNESS AND TINGLING OF LEFT ARM AND LEG: ICD-10-CM

## 2024-01-05 DIAGNOSIS — I13.0 HYPERTENSIVE HEART AND CHRONIC KIDNEY DISEASE WITH HEART FAILURE AND STAGE 1 THROUGH STAGE 4 CHRONIC KIDNEY DISEASE, OR UNSPECIFIED CHRONIC KIDNEY DISEASE (MULTI): ICD-10-CM

## 2024-01-05 DIAGNOSIS — R20.2 NUMBNESS AND TINGLING OF LEFT ARM AND LEG: ICD-10-CM

## 2024-01-05 PROBLEM — R29.898 LEG WEAKNESS, BILATERAL: Status: ACTIVE | Noted: 2024-01-05

## 2024-01-05 LAB
ALBUMIN SERPL-MCNC: 3.8 G/DL (ref 3.5–5)
ALP BLD-CCNC: 69 U/L (ref 35–125)
ALT SERPL-CCNC: 6 U/L (ref 5–40)
ANION GAP SERPL CALC-SCNC: 15 MMOL/L
APPEARANCE UR: CLEAR
APTT PPP: 33.3 SECONDS (ref 22–32.5)
AST SERPL-CCNC: 14 U/L (ref 5–40)
ATRIAL RATE: 83 BPM
BASOPHILS # BLD AUTO: 0.02 X10*3/UL (ref 0–0.1)
BASOPHILS NFR BLD AUTO: 0.3 %
BILIRUB SERPL-MCNC: 1.4 MG/DL (ref 0.1–1.2)
BILIRUB UR STRIP.AUTO-MCNC: NEGATIVE MG/DL
BUN SERPL-MCNC: 14 MG/DL (ref 8–25)
CALCIUM SERPL-MCNC: 9.6 MG/DL (ref 8.5–10.4)
CHLORIDE SERPL-SCNC: 98 MMOL/L (ref 97–107)
CO2 SERPL-SCNC: 21 MMOL/L (ref 24–31)
COLOR UR: YELLOW
CREAT SERPL-MCNC: 0.8 MG/DL (ref 0.4–1.6)
CRP SERPL-MCNC: 14.17 MG/DL (ref 0–2)
EOSINOPHIL # BLD AUTO: 0.01 X10*3/UL (ref 0–0.7)
EOSINOPHIL NFR BLD AUTO: 0.2 %
ERYTHROCYTE [DISTWIDTH] IN BLOOD BY AUTOMATED COUNT: 13.8 % (ref 11.5–14.5)
ERYTHROCYTE [SEDIMENTATION RATE] IN BLOOD BY WESTERGREN METHOD: 53 MM/H (ref 0–20)
GFR SERPL CREATININE-BSD FRML MDRD: >90 ML/MIN/1.73M*2
GLUCOSE BLD MANUAL STRIP-MCNC: 120 MG/DL (ref 74–99)
GLUCOSE BLD MANUAL STRIP-MCNC: 130 MG/DL (ref 74–99)
GLUCOSE SERPL-MCNC: 130 MG/DL (ref 65–99)
GLUCOSE UR STRIP.AUTO-MCNC: NORMAL MG/DL
HCT VFR BLD AUTO: 38.2 % (ref 41–52)
HGB BLD-MCNC: 12.8 G/DL (ref 13.5–17.5)
IMM GRANULOCYTES # BLD AUTO: 0.03 X10*3/UL (ref 0–0.7)
IMM GRANULOCYTES NFR BLD AUTO: 0.5 % (ref 0–0.9)
INR PPP: 1.2 (ref 0.9–1.2)
KETONES UR STRIP.AUTO-MCNC: ABNORMAL MG/DL
LACTATE BLDV-SCNC: 1.4 MMOL/L (ref 0.4–2)
LEUKOCYTE ESTERASE UR QL STRIP.AUTO: NEGATIVE
LYMPHOCYTES # BLD AUTO: 0.76 X10*3/UL (ref 1.2–4.8)
LYMPHOCYTES NFR BLD AUTO: 13.2 %
MCH RBC QN AUTO: 30.7 PG (ref 26–34)
MCHC RBC AUTO-ENTMCNC: 33.5 G/DL (ref 32–36)
MCV RBC AUTO: 92 FL (ref 80–100)
MONOCYTES # BLD AUTO: 0.69 X10*3/UL (ref 0.1–1)
MONOCYTES NFR BLD AUTO: 12 %
NEUTROPHILS # BLD AUTO: 4.24 X10*3/UL (ref 1.2–7.7)
NEUTROPHILS NFR BLD AUTO: 73.8 %
NITRITE UR QL STRIP.AUTO: NEGATIVE
NRBC BLD-RTO: 0 /100 WBCS (ref 0–0)
P AXIS: 10 DEGREES
PH UR STRIP.AUTO: 6.5 [PH]
PLATELET # BLD AUTO: 143 X10*3/UL (ref 150–450)
POCT GLUCOSE: 120 MG/DL (ref 74–99)
POTASSIUM SERPL-SCNC: 3.6 MMOL/L (ref 3.4–5.1)
PR INTERVAL: 144 MS
PROT SERPL-MCNC: 7.5 G/DL (ref 5.9–7.9)
PROT UR STRIP.AUTO-MCNC: ABNORMAL MG/DL
PROTHROMBIN TIME: 12.2 SECONDS (ref 9.3–12.7)
Q ONSET: 213 MS
QRS COUNT: 14 BEATS
QRS DURATION: 96 MS
QT INTERVAL: 388 MS
QTC CALCULATION(BAZETT): 455 MS
QTC FREDERICIA: 432 MS
R AXIS: -49 DEGREES
RBC # BLD AUTO: 4.17 X10*6/UL (ref 4.5–5.9)
RBC # UR STRIP.AUTO: ABNORMAL /UL
RBC #/AREA URNS AUTO: NORMAL /HPF
SODIUM SERPL-SCNC: 134 MMOL/L (ref 133–145)
SP GR UR STRIP.AUTO: 1.03
T AXIS: 45 DEGREES
T OFFSET: 407 MS
TROPONIN T SERPL-MCNC: 23 NG/L
URATE SERPL-MCNC: 7.6 MG/DL (ref 3.6–7.7)
UROBILINOGEN UR STRIP.AUTO-MCNC: ABNORMAL MG/DL
VENTRICULAR RATE: 83 BPM
WBC # BLD AUTO: 5.8 X10*3/UL (ref 4.4–11.3)
WBC #/AREA URNS AUTO: NORMAL /HPF

## 2024-01-05 PROCEDURE — 99291 CRITICAL CARE FIRST HOUR: CPT | Mod: 25

## 2024-01-05 PROCEDURE — 81001 URINALYSIS AUTO W/SCOPE: CPT | Performed by: INTERNAL MEDICINE

## 2024-01-05 PROCEDURE — G0378 HOSPITAL OBSERVATION PER HR: HCPCS

## 2024-01-05 PROCEDURE — 84484 ASSAY OF TROPONIN QUANT: CPT | Performed by: STUDENT IN AN ORGANIZED HEALTH CARE EDUCATION/TRAINING PROGRAM

## 2024-01-05 PROCEDURE — 83605 ASSAY OF LACTIC ACID: CPT | Performed by: INTERNAL MEDICINE

## 2024-01-05 PROCEDURE — 70450 CT HEAD/BRAIN W/O DYE: CPT | Mod: FOREIGN READ | Performed by: RADIOLOGY

## 2024-01-05 PROCEDURE — 82947 ASSAY GLUCOSE BLOOD QUANT: CPT | Mod: 91

## 2024-01-05 PROCEDURE — 85610 PROTHROMBIN TIME: CPT | Performed by: STUDENT IN AN ORGANIZED HEALTH CARE EDUCATION/TRAINING PROGRAM

## 2024-01-05 PROCEDURE — 70551 MRI BRAIN STEM W/O DYE: CPT

## 2024-01-05 PROCEDURE — 71045 X-RAY EXAM CHEST 1 VIEW: CPT

## 2024-01-05 PROCEDURE — 87040 BLOOD CULTURE FOR BACTERIA: CPT | Mod: TRILAB | Performed by: INTERNAL MEDICINE

## 2024-01-05 PROCEDURE — 70450 CT HEAD/BRAIN W/O DYE: CPT | Mod: FR

## 2024-01-05 PROCEDURE — 85730 THROMBOPLASTIN TIME PARTIAL: CPT | Performed by: STUDENT IN AN ORGANIZED HEALTH CARE EDUCATION/TRAINING PROGRAM

## 2024-01-05 PROCEDURE — 82947 ASSAY GLUCOSE BLOOD QUANT: CPT

## 2024-01-05 PROCEDURE — 2550000001 HC RX 255 CONTRASTS: Performed by: STUDENT IN AN ORGANIZED HEALTH CARE EDUCATION/TRAINING PROGRAM

## 2024-01-05 PROCEDURE — 84550 ASSAY OF BLOOD/URIC ACID: CPT | Performed by: INTERNAL MEDICINE

## 2024-01-05 PROCEDURE — 2500000001 HC RX 250 WO HCPCS SELF ADMINISTERED DRUGS (ALT 637 FOR MEDICARE OP): Performed by: STUDENT IN AN ORGANIZED HEALTH CARE EDUCATION/TRAINING PROGRAM

## 2024-01-05 PROCEDURE — 36415 COLL VENOUS BLD VENIPUNCTURE: CPT | Performed by: STUDENT IN AN ORGANIZED HEALTH CARE EDUCATION/TRAINING PROGRAM

## 2024-01-05 PROCEDURE — 80053 COMPREHEN METABOLIC PANEL: CPT | Performed by: STUDENT IN AN ORGANIZED HEALTH CARE EDUCATION/TRAINING PROGRAM

## 2024-01-05 PROCEDURE — 96374 THER/PROPH/DIAG INJ IV PUSH: CPT

## 2024-01-05 PROCEDURE — 2500000004 HC RX 250 GENERAL PHARMACY W/ HCPCS (ALT 636 FOR OP/ED): Performed by: INTERNAL MEDICINE

## 2024-01-05 PROCEDURE — 2500000001 HC RX 250 WO HCPCS SELF ADMINISTERED DRUGS (ALT 637 FOR MEDICARE OP): Performed by: INTERNAL MEDICINE

## 2024-01-05 PROCEDURE — 70496 CT ANGIOGRAPHY HEAD: CPT

## 2024-01-05 PROCEDURE — 86140 C-REACTIVE PROTEIN: CPT | Performed by: INTERNAL MEDICINE

## 2024-01-05 PROCEDURE — 87086 URINE CULTURE/COLONY COUNT: CPT | Mod: TRILAB | Performed by: INTERNAL MEDICINE

## 2024-01-05 PROCEDURE — 2500000004 HC RX 250 GENERAL PHARMACY W/ HCPCS (ALT 636 FOR OP/ED): Performed by: STUDENT IN AN ORGANIZED HEALTH CARE EDUCATION/TRAINING PROGRAM

## 2024-01-05 PROCEDURE — 36415 COLL VENOUS BLD VENIPUNCTURE: CPT | Performed by: INTERNAL MEDICINE

## 2024-01-05 PROCEDURE — 85025 COMPLETE CBC W/AUTO DIFF WBC: CPT | Performed by: STUDENT IN AN ORGANIZED HEALTH CARE EDUCATION/TRAINING PROGRAM

## 2024-01-05 PROCEDURE — 85652 RBC SED RATE AUTOMATED: CPT | Performed by: INTERNAL MEDICINE

## 2024-01-05 PROCEDURE — 93005 ELECTROCARDIOGRAM TRACING: CPT

## 2024-01-05 PROCEDURE — 99285 EMERGENCY DEPT VISIT HI MDM: CPT | Performed by: STUDENT IN AN ORGANIZED HEALTH CARE EDUCATION/TRAINING PROGRAM

## 2024-01-05 RX ORDER — PANTOPRAZOLE SODIUM 20 MG/1
20 TABLET, DELAYED RELEASE ORAL DAILY
Status: DISCONTINUED | OUTPATIENT
Start: 2024-01-05 | End: 2024-01-09 | Stop reason: HOSPADM

## 2024-01-05 RX ORDER — CHOLECALCIFEROL (VITAMIN D3) 125 MCG
5000 CAPSULE ORAL DAILY
Status: DISCONTINUED | OUTPATIENT
Start: 2024-01-05 | End: 2024-01-09 | Stop reason: HOSPADM

## 2024-01-05 RX ORDER — VANCOMYCIN 1.5 G/300ML
1500 INJECTION, SOLUTION INTRAVENOUS ONCE
Status: COMPLETED | OUTPATIENT
Start: 2024-01-05 | End: 2024-01-06

## 2024-01-05 RX ORDER — MORPHINE SULFATE 4 MG/ML
4 INJECTION, SOLUTION INTRAMUSCULAR; INTRAVENOUS ONCE
Status: COMPLETED | OUTPATIENT
Start: 2024-01-05 | End: 2024-01-05

## 2024-01-05 RX ORDER — BUSPIRONE HYDROCHLORIDE 10 MG/1
10 TABLET ORAL 2 TIMES DAILY
Status: DISCONTINUED | OUTPATIENT
Start: 2024-01-05 | End: 2024-01-09 | Stop reason: HOSPADM

## 2024-01-05 RX ORDER — TRAZODONE HYDROCHLORIDE 100 MG/1
100 TABLET ORAL NIGHTLY
Status: DISCONTINUED | OUTPATIENT
Start: 2024-01-05 | End: 2024-01-09 | Stop reason: HOSPADM

## 2024-01-05 RX ORDER — LEVOTHYROXINE SODIUM 50 UG/1
50 TABLET ORAL
Status: DISCONTINUED | OUTPATIENT
Start: 2024-01-05 | End: 2024-01-09 | Stop reason: HOSPADM

## 2024-01-05 RX ORDER — HYDROXYCHLOROQUINE SULFATE 200 MG/1
200 TABLET, FILM COATED ORAL DAILY
Status: DISCONTINUED | OUTPATIENT
Start: 2024-01-05 | End: 2024-01-09 | Stop reason: HOSPADM

## 2024-01-05 RX ORDER — GABAPENTIN 100 MG/1
100 CAPSULE ORAL 3 TIMES DAILY
Status: DISCONTINUED | OUTPATIENT
Start: 2024-01-05 | End: 2024-01-09 | Stop reason: HOSPADM

## 2024-01-05 RX ORDER — METFORMIN HYDROCHLORIDE 500 MG/1
500 TABLET ORAL
Status: DISCONTINUED | OUTPATIENT
Start: 2024-01-06 | End: 2024-01-09 | Stop reason: HOSPADM

## 2024-01-05 RX ORDER — LANOLIN ALCOHOL/MO/W.PET/CERES
1000 CREAM (GRAM) TOPICAL DAILY
Status: DISCONTINUED | OUTPATIENT
Start: 2024-01-05 | End: 2024-01-09 | Stop reason: HOSPADM

## 2024-01-05 RX ORDER — ALLOPURINOL 100 MG/1
100 TABLET ORAL DAILY
Status: DISCONTINUED | OUTPATIENT
Start: 2024-01-05 | End: 2024-01-09 | Stop reason: HOSPADM

## 2024-01-05 RX ORDER — BISMUTH SUBSALICYLATE 262 MG
1 TABLET,CHEWABLE ORAL DAILY
Status: DISCONTINUED | OUTPATIENT
Start: 2024-01-05 | End: 2024-01-09 | Stop reason: HOSPADM

## 2024-01-05 RX ORDER — ACETAMINOPHEN 325 MG/1
650 TABLET ORAL 4 TIMES DAILY
Status: DISCONTINUED | OUTPATIENT
Start: 2024-01-05 | End: 2024-01-09 | Stop reason: HOSPADM

## 2024-01-05 RX ORDER — LURASIDONE HYDROCHLORIDE 40 MG/1
40 TABLET, FILM COATED ORAL DAILY
Status: DISCONTINUED | OUTPATIENT
Start: 2024-01-05 | End: 2024-01-09 | Stop reason: HOSPADM

## 2024-01-05 RX ORDER — ACETAMINOPHEN 325 MG/1
325 TABLET ORAL 4 TIMES DAILY
Status: DISCONTINUED | OUTPATIENT
Start: 2024-01-05 | End: 2024-01-05

## 2024-01-05 RX ORDER — NAPROXEN SODIUM 220 MG/1
324 TABLET, FILM COATED ORAL ONCE
Status: COMPLETED | OUTPATIENT
Start: 2024-01-05 | End: 2024-01-05

## 2024-01-05 RX ORDER — ASCORBIC ACID 500 MG
500 TABLET ORAL 2 TIMES DAILY
Status: DISCONTINUED | OUTPATIENT
Start: 2024-01-05 | End: 2024-01-09 | Stop reason: HOSPADM

## 2024-01-05 RX ORDER — CEFTRIAXONE 2 G/50ML
2 INJECTION, SOLUTION INTRAVENOUS EVERY 24 HOURS
Status: DISCONTINUED | OUTPATIENT
Start: 2024-01-05 | End: 2024-01-09 | Stop reason: HOSPADM

## 2024-01-05 RX ORDER — DEXAMETHASONE 4 MG/1
4 TABLET ORAL EVERY 8 HOURS SCHEDULED
Status: DISCONTINUED | OUTPATIENT
Start: 2024-01-05 | End: 2024-01-07

## 2024-01-05 RX ORDER — NITROFURANTOIN 25; 75 MG/1; MG/1
100 CAPSULE ORAL DAILY
Status: DISCONTINUED | OUTPATIENT
Start: 2024-01-05 | End: 2024-01-09 | Stop reason: HOSPADM

## 2024-01-05 RX ORDER — DIAZEPAM 5 MG/ML
2 INJECTION, SOLUTION INTRAMUSCULAR; INTRAVENOUS ONCE
Status: COMPLETED | OUTPATIENT
Start: 2024-01-05 | End: 2024-01-05

## 2024-01-05 RX ORDER — HYDROXYZINE HYDROCHLORIDE 25 MG/1
25 TABLET, FILM COATED ORAL NIGHTLY
Status: DISCONTINUED | OUTPATIENT
Start: 2024-01-05 | End: 2024-01-09 | Stop reason: HOSPADM

## 2024-01-05 RX ORDER — METOPROLOL TARTRATE 100 MG/1
100 TABLET ORAL 2 TIMES DAILY
Status: DISCONTINUED | OUTPATIENT
Start: 2024-01-05 | End: 2024-01-07

## 2024-01-05 RX ORDER — SODIUM CHLORIDE, SODIUM LACTATE, POTASSIUM CHLORIDE, CALCIUM CHLORIDE 600; 310; 30; 20 MG/100ML; MG/100ML; MG/100ML; MG/100ML
50 INJECTION, SOLUTION INTRAVENOUS CONTINUOUS
Status: DISCONTINUED | OUTPATIENT
Start: 2024-01-05 | End: 2024-01-09 | Stop reason: HOSPADM

## 2024-01-05 RX ORDER — DIAZEPAM 5 MG/1
10 TABLET ORAL ONCE
Status: DISCONTINUED | OUTPATIENT
Start: 2024-01-05 | End: 2024-01-09 | Stop reason: HOSPADM

## 2024-01-05 RX ORDER — OXYCODONE HYDROCHLORIDE 5 MG/1
5 TABLET ORAL EVERY 6 HOURS PRN
Status: DISCONTINUED | OUTPATIENT
Start: 2024-01-05 | End: 2024-01-09 | Stop reason: HOSPADM

## 2024-01-05 RX ORDER — VILAZODONE HYDROCHLORIDE 40 MG/1
40 TABLET ORAL
Status: DISCONTINUED | OUTPATIENT
Start: 2024-01-06 | End: 2024-01-09 | Stop reason: HOSPADM

## 2024-01-05 RX ADMIN — BUSPIRONE HYDROCHLORIDE 10 MG: 10 TABLET ORAL at 13:50

## 2024-01-05 RX ADMIN — LOSARTAN POTASSIUM 75 MG: 50 TABLET, FILM COATED ORAL at 13:50

## 2024-01-05 RX ADMIN — PANTOPRAZOLE SODIUM 20 MG: 20 TABLET, DELAYED RELEASE ORAL at 13:51

## 2024-01-05 RX ADMIN — GABAPENTIN 100 MG: 100 CAPSULE ORAL at 15:00

## 2024-01-05 RX ADMIN — BUSPIRONE HYDROCHLORIDE 10 MG: 10 TABLET ORAL at 22:16

## 2024-01-05 RX ADMIN — HYDROXYCHLOROQUINE SULFATE 200 MG: 200 TABLET ORAL at 13:51

## 2024-01-05 RX ADMIN — APIXABAN 5 MG: 5 TABLET, FILM COATED ORAL at 22:15

## 2024-01-05 RX ADMIN — OXYCODONE HYDROCHLORIDE AND ACETAMINOPHEN 500 MG: 500 TABLET ORAL at 22:15

## 2024-01-05 RX ADMIN — APIXABAN 5 MG: 5 TABLET, FILM COATED ORAL at 13:50

## 2024-01-05 RX ADMIN — TRAZODONE HYDROCHLORIDE 100 MG: 100 TABLET ORAL at 22:16

## 2024-01-05 RX ADMIN — Medication 125 MCG: at 13:50

## 2024-01-05 RX ADMIN — ALLOPURINOL 100 MG: 100 TABLET ORAL at 13:50

## 2024-01-05 RX ADMIN — GABAPENTIN 100 MG: 100 CAPSULE ORAL at 22:16

## 2024-01-05 RX ADMIN — ACETAMINOPHEN 650 MG: 325 TABLET ORAL at 17:30

## 2024-01-05 RX ADMIN — DEXAMETHASONE 4 MG: 4 TABLET ORAL at 22:16

## 2024-01-05 RX ADMIN — CEFTRIAXONE SODIUM 2 G: 2 INJECTION, SOLUTION INTRAVENOUS at 23:17

## 2024-01-05 RX ADMIN — NITROFURANTOIN MONOHYDRATE/MACROCRYSTALS 100 MG: 75; 25 CAPSULE ORAL at 13:51

## 2024-01-05 RX ADMIN — CYANOCOBALAMIN TAB 1000 MCG 1000 MCG: 1000 TAB at 13:50

## 2024-01-05 RX ADMIN — ASPIRIN 324 MG: 81 TABLET, CHEWABLE ORAL at 07:09

## 2024-01-05 RX ADMIN — DIAZEPAM 2 MG: 5 INJECTION INTRAMUSCULAR; INTRAVENOUS at 20:42

## 2024-01-05 RX ADMIN — ACETAMINOPHEN 650 MG: 325 TABLET ORAL at 23:22

## 2024-01-05 RX ADMIN — LURASIDONE HYDROCHLORIDE 40 MG: 40 TABLET, FILM COATED ORAL at 13:00

## 2024-01-05 RX ADMIN — METOPROLOL 100 MG: 100 TABLET ORAL at 13:51

## 2024-01-05 RX ADMIN — MORPHINE SULFATE 4 MG: 4 INJECTION, SOLUTION INTRAMUSCULAR; INTRAVENOUS at 08:38

## 2024-01-05 RX ADMIN — SODIUM CHLORIDE, SODIUM LACTATE, POTASSIUM CHLORIDE, AND CALCIUM CHLORIDE 100 ML/HR: 600; 310; 30; 20 INJECTION, SOLUTION INTRAVENOUS at 22:11

## 2024-01-05 RX ADMIN — HYDROXYZINE HYDROCHLORIDE 25 MG: 25 TABLET, FILM COATED ORAL at 22:16

## 2024-01-05 RX ADMIN — METOPROLOL 100 MG: 100 TABLET ORAL at 22:15

## 2024-01-05 RX ADMIN — SODIUM CHLORIDE, SODIUM LACTATE, POTASSIUM CHLORIDE, AND CALCIUM CHLORIDE 1000 ML: 600; 310; 30; 20 INJECTION, SOLUTION INTRAVENOUS at 22:19

## 2024-01-05 RX ADMIN — IOHEXOL 75 ML: 350 INJECTION, SOLUTION INTRAVENOUS at 08:43

## 2024-01-05 RX ADMIN — OXYCODONE HYDROCHLORIDE AND ACETAMINOPHEN 500 MG: 500 TABLET ORAL at 13:51

## 2024-01-05 ASSESSMENT — PAIN - FUNCTIONAL ASSESSMENT
PAIN_FUNCTIONAL_ASSESSMENT: 0-10

## 2024-01-05 ASSESSMENT — ENCOUNTER SYMPTOMS
CARDIOVASCULAR NEGATIVE: 1
ENDOCRINE NEGATIVE: 1
EYES NEGATIVE: 1
AGITATION: 1
RESPIRATORY NEGATIVE: 1
GASTROINTESTINAL NEGATIVE: 1
CONSTITUTIONAL NEGATIVE: 1
HEMATOLOGIC/LYMPHATIC NEGATIVE: 1
BACK PAIN: 1
ALLERGIC/IMMUNOLOGIC NEGATIVE: 1

## 2024-01-05 ASSESSMENT — COLUMBIA-SUICIDE SEVERITY RATING SCALE - C-SSRS
2. HAVE YOU ACTUALLY HAD ANY THOUGHTS OF KILLING YOURSELF?: NO
1. IN THE PAST MONTH, HAVE YOU WISHED YOU WERE DEAD OR WISHED YOU COULD GO TO SLEEP AND NOT WAKE UP?: NO
6. HAVE YOU EVER DONE ANYTHING, STARTED TO DO ANYTHING, OR PREPARED TO DO ANYTHING TO END YOUR LIFE?: NO

## 2024-01-05 ASSESSMENT — PAIN SCALES - GENERAL
PAINLEVEL_OUTOF10: 10 - WORST POSSIBLE PAIN
PAINLEVEL_OUTOF10: 7
PAINLEVEL_OUTOF10: 10 - WORST POSSIBLE PAIN
PAINLEVEL_OUTOF10: 10 - WORST POSSIBLE PAIN

## 2024-01-05 ASSESSMENT — PAIN DESCRIPTION - LOCATION
LOCATION: KNEE
LOCATION: ARM

## 2024-01-05 ASSESSMENT — PAIN DESCRIPTION - ORIENTATION: ORIENTATION: LEFT
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
## 2024-01-05 NOTE — TELECONSULT
HPI: Telestroke consult at   68 y.o. male with L hemisensory symptoms especially LLE numbness weakness.  Presented 10/2023 with similar complaints, attributed to lumbar disease.  Scheduled for pain mgt procedure, held eliquis for a few days this week.     Last known Well: LKW last night, awoke 3am with symptoms  NIH Stroke Scale Reported: 2-3especially LLE, which has been an issue for months, prior     Prior Functional Status (Modified Stites Scale):  3 Moderate disability; requiring some help, but able to walk without assistance  Using walker to ambulate.      Imaging Results:  Head CT: `no hemorrhage or early infarct signs  Head/Neck @CTA@     Assessment:   Working Diagnosis: TIA/ Ischemic Stroke vs non-stroke cause given recent history over preceeding months     Recommendations:   IV tPA is not recommended/ Rationale anticoagulation, time  Patient is NOT a candidate for endovascular treatment/ Rationale low NIH, uncertain diagnosis      Additional Recommendations: would recommend CTA however given recent interruption of therapy      Consult completed by: TELEPHONE communication was used to provide this telehealth service.  Time includes consultation with ED provider and extensive review of data- history, medical records, test results, and neuroimaging studies: 11 - 20 mins was spent in consultation

## 2024-01-06 LAB
GLUCOSE BLD MANUAL STRIP-MCNC: 149 MG/DL (ref 74–99)
GLUCOSE BLD MANUAL STRIP-MCNC: 172 MG/DL (ref 74–99)
GLUCOSE BLD MANUAL STRIP-MCNC: 185 MG/DL (ref 74–99)
GLUCOSE BLD MANUAL STRIP-MCNC: 194 MG/DL (ref 74–99)

## 2024-01-06 PROCEDURE — 97161 PT EVAL LOW COMPLEX 20 MIN: CPT | Mod: GP

## 2024-01-06 PROCEDURE — 2500000002 HC RX 250 W HCPCS SELF ADMINISTERED DRUGS (ALT 637 FOR MEDICARE OP, ALT 636 FOR OP/ED): Performed by: INTERNAL MEDICINE

## 2024-01-06 PROCEDURE — 2500000004 HC RX 250 GENERAL PHARMACY W/ HCPCS (ALT 636 FOR OP/ED): Performed by: INTERNAL MEDICINE

## 2024-01-06 PROCEDURE — G0378 HOSPITAL OBSERVATION PER HR: HCPCS

## 2024-01-06 PROCEDURE — 82947 ASSAY GLUCOSE BLOOD QUANT: CPT

## 2024-01-06 PROCEDURE — 2500000001 HC RX 250 WO HCPCS SELF ADMINISTERED DRUGS (ALT 637 FOR MEDICARE OP): Performed by: INTERNAL MEDICINE

## 2024-01-06 PROCEDURE — 2500000005 HC RX 250 GENERAL PHARMACY W/O HCPCS: Performed by: INTERNAL MEDICINE

## 2024-01-06 RX ORDER — DEXTROSE MONOHYDRATE 100 MG/ML
0.3 INJECTION, SOLUTION INTRAVENOUS ONCE AS NEEDED
Status: DISCONTINUED | OUTPATIENT
Start: 2024-01-06 | End: 2024-01-09 | Stop reason: HOSPADM

## 2024-01-06 RX ORDER — LIDOCAINE 560 MG/1
1 PATCH PERCUTANEOUS; TOPICAL; TRANSDERMAL DAILY
Status: DISCONTINUED | OUTPATIENT
Start: 2024-01-06 | End: 2024-01-09 | Stop reason: HOSPADM

## 2024-01-06 RX ORDER — INSULIN LISPRO 100 [IU]/ML
0-10 INJECTION, SOLUTION INTRAVENOUS; SUBCUTANEOUS
Status: DISCONTINUED | OUTPATIENT
Start: 2024-01-06 | End: 2024-01-09 | Stop reason: HOSPADM

## 2024-01-06 RX ORDER — DEXTROSE 50 % IN WATER (D50W) INTRAVENOUS SYRINGE
25
Status: DISCONTINUED | OUTPATIENT
Start: 2024-01-06 | End: 2024-01-09 | Stop reason: HOSPADM

## 2024-01-06 RX ADMIN — HYDROXYCHLOROQUINE SULFATE 200 MG: 200 TABLET ORAL at 08:54

## 2024-01-06 RX ADMIN — DEXAMETHASONE 4 MG: 4 TABLET ORAL at 20:53

## 2024-01-06 RX ADMIN — DEXAMETHASONE 4 MG: 4 TABLET ORAL at 13:07

## 2024-01-06 RX ADMIN — DEXAMETHASONE 4 MG: 4 TABLET ORAL at 05:38

## 2024-01-06 RX ADMIN — NITROFURANTOIN MONOHYDRATE/MACROCRYSTALS 100 MG: 75; 25 CAPSULE ORAL at 05:39

## 2024-01-06 RX ADMIN — GABAPENTIN 100 MG: 100 CAPSULE ORAL at 20:46

## 2024-01-06 RX ADMIN — VANCOMYCIN 1.5 G: 1.5 INJECTION, SOLUTION INTRAVENOUS at 00:02

## 2024-01-06 RX ADMIN — GABAPENTIN 100 MG: 100 CAPSULE ORAL at 08:54

## 2024-01-06 RX ADMIN — MULTIVITAMIN TABLET 1 TABLET: TABLET at 08:54

## 2024-01-06 RX ADMIN — TRAZODONE HYDROCHLORIDE 100 MG: 100 TABLET ORAL at 20:46

## 2024-01-06 RX ADMIN — OXYCODONE HYDROCHLORIDE 5 MG: 5 TABLET ORAL at 05:41

## 2024-01-06 RX ADMIN — ALLOPURINOL 100 MG: 100 TABLET ORAL at 08:54

## 2024-01-06 RX ADMIN — APIXABAN 5 MG: 5 TABLET, FILM COATED ORAL at 20:45

## 2024-01-06 RX ADMIN — ACETAMINOPHEN 650 MG: 325 TABLET ORAL at 13:07

## 2024-01-06 RX ADMIN — METOPROLOL 100 MG: 100 TABLET ORAL at 20:45

## 2024-01-06 RX ADMIN — LEVOTHYROXINE SODIUM 50 MCG: 0.05 TABLET ORAL at 05:38

## 2024-01-06 RX ADMIN — LIDOCAINE 1 PATCH: 4 PATCH TOPICAL at 11:45

## 2024-01-06 RX ADMIN — ACETAMINOPHEN 650 MG: 325 TABLET ORAL at 20:46

## 2024-01-06 RX ADMIN — CEFTRIAXONE SODIUM 2 G: 2 INJECTION, SOLUTION INTRAVENOUS at 20:45

## 2024-01-06 RX ADMIN — APIXABAN 5 MG: 5 TABLET, FILM COATED ORAL at 08:54

## 2024-01-06 RX ADMIN — OXYCODONE HYDROCHLORIDE AND ACETAMINOPHEN 500 MG: 500 TABLET ORAL at 08:54

## 2024-01-06 RX ADMIN — CYANOCOBALAMIN TAB 1000 MCG 1000 MCG: 1000 TAB at 08:53

## 2024-01-06 RX ADMIN — BUSPIRONE HYDROCHLORIDE 10 MG: 10 TABLET ORAL at 20:45

## 2024-01-06 RX ADMIN — METFORMIN HYDROCHLORIDE 500 MG: 500 TABLET, FILM COATED ORAL at 08:53

## 2024-01-06 RX ADMIN — PANTOPRAZOLE SODIUM 20 MG: 20 TABLET, DELAYED RELEASE ORAL at 08:54

## 2024-01-06 RX ADMIN — ACETAMINOPHEN 650 MG: 325 TABLET ORAL at 05:38

## 2024-01-06 RX ADMIN — LOSARTAN POTASSIUM 75 MG: 50 TABLET, FILM COATED ORAL at 08:54

## 2024-01-06 RX ADMIN — BUSPIRONE HYDROCHLORIDE 10 MG: 10 TABLET ORAL at 08:54

## 2024-01-06 RX ADMIN — Medication 125 MCG: at 08:53

## 2024-01-06 RX ADMIN — METOPROLOL 100 MG: 100 TABLET ORAL at 09:00

## 2024-01-06 RX ADMIN — OXYCODONE HYDROCHLORIDE AND ACETAMINOPHEN 500 MG: 500 TABLET ORAL at 20:46

## 2024-01-06 RX ADMIN — HYDROXYZINE HYDROCHLORIDE 25 MG: 25 TABLET, FILM COATED ORAL at 20:46

## 2024-01-06 RX ADMIN — GABAPENTIN 100 MG: 100 CAPSULE ORAL at 15:00

## 2024-01-06 ASSESSMENT — COGNITIVE AND FUNCTIONAL STATUS - GENERAL
MOVING TO AND FROM BED TO CHAIR: A LITTLE
MOVING TO AND FROM BED TO CHAIR: A LOT
MOBILITY SCORE: 17
TURNING FROM BACK TO SIDE WHILE IN FLAT BAD: A LITTLE
MOVING FROM LYING ON BACK TO SITTING ON SIDE OF FLAT BED WITH BEDRAILS: A LITTLE
MOBILITY SCORE: 14
EATING MEALS: A LITTLE
TOILETING: A LOT
PERSONAL GROOMING: A LITTLE
TURNING FROM BACK TO SIDE WHILE IN FLAT BAD: A LITTLE
HELP NEEDED FOR BATHING: A LITTLE
DAILY ACTIVITIY SCORE: 16
DRESSING REGULAR UPPER BODY CLOTHING: A LITTLE
WALKING IN HOSPITAL ROOM: A LITTLE
CLIMB 3 TO 5 STEPS WITH RAILING: A LOT
CLIMB 3 TO 5 STEPS WITH RAILING: TOTAL
DRESSING REGULAR LOWER BODY CLOTHING: A LOT
STANDING UP FROM CHAIR USING ARMS: A LITTLE
WALKING IN HOSPITAL ROOM: A LOT
STANDING UP FROM CHAIR USING ARMS: A LOT

## 2024-01-06 ASSESSMENT — PAIN DESCRIPTION - ORIENTATION
ORIENTATION: LEFT

## 2024-01-06 ASSESSMENT — PAIN DESCRIPTION - LOCATION
LOCATION: KNEE

## 2024-01-06 ASSESSMENT — PAIN SCALES - GENERAL
PAINLEVEL_OUTOF10: 7
PAINLEVEL_OUTOF10: 8

## 2024-01-06 ASSESSMENT — PAIN - FUNCTIONAL ASSESSMENT
PAIN_FUNCTIONAL_ASSESSMENT: 0-10

## 2024-01-06 ASSESSMENT — ACTIVITIES OF DAILY LIVING (ADL): ADL_ASSISTANCE: INDEPENDENT

## 2024-01-06 NOTE — CONSULTS
Reason For Consult  Left knee pain    History Of Present Illness  Eugenio Bliss is a 68 y.o. male presenting with bilateral lower extremity weakness left greater than right.  Orthopedics was consulted for left knee pain.  Patient describes weakness on his left side along with numbness from his knee down.  Patient states this began yesterday.  He notes pain over the anterior knee.  Denies recent fall or trauma.  He states he has had a history of multiple gout attacks in his foot and knees in the past.  He also states she has known significant arthritis in the knee.  Denies any other complaints     Past Medical History  He has a past medical history of Abdominal hernia (05/16/2023), Achilles tendinitis, right leg, Acute frontal sinusitis, unspecified (02/03/2020), Allergic rhinitis (05/16/2023), Body mass index (BMI) 39.0-39.9, adult (05/25/2021), CHF (congestive heart failure) (CMS/[facility]), Chondrocostal junction syndrome (tietze) (07/22/2013), Contact with and (suspected) exposure to covid-19, COPD (chronic obstructive pulmonary disease) (CMS/[facility]), Deficiency of other specified B group vitamins (12/03/2019), Diabetes mellitus (CMS/[facility]), Dry eye syndrome of unspecified lacrimal gland (12/29/2014), Effusion, right ankle (06/13/2018), Encounter for screening for malignant neoplasm of colon (05/19/2016), Encounter for screening for malignant neoplasm of prostate (04/24/2019), Gallbladder attack (05/16/2023), Hypertension (05/21/2023), Inguinal hernia (05/21/2023), Obesity, unspecified (05/04/2022), Other conditions influencing health status (04/22/2013), Other conditions influencing health status (08/14/2019), Other conditions influencing health status (04/22/2013), Pain in right ankle and joints of right foot, Pain in unspecified elbow (07/10/2017), Personal history of diseases of the blood and blood-forming organs and certain disorders involving the immune mechanism (12/03/2019), Personal history of diseases of the  blood and blood-forming organs and certain disorders involving the immune mechanism (12/03/2019), Personal history of other (healed) physical injury and trauma (04/16/2019), Personal history of other diseases of the digestive system (12/03/2019), Personal history of other diseases of the digestive system (07/08/2022), Personal history of other diseases of the respiratory system, Personal history of other diseases of urinary system (12/03/2019), Personal history of other endocrine, nutritional and metabolic disease (04/22/2013), Personal history of other endocrine, nutritional and metabolic disease (07/29/2016), Personal history of other specified conditions (10/17/2019), Personal history of other specified conditions, Personal history of other specified conditions (09/06/2013), Personal history of other specified conditions (04/22/2013), Personal history of pneumonia (recurrent) (09/04/2020), Pleurodynia (06/05/2020), Pure hypercholesterolemia, unspecified (11/08/2013), Right knee pain (05/21/2023), Sjogren syndrome, unspecified (CMS/[facility]), Snoring (05/21/2023), Stiffness of right ankle, not elsewhere classified (06/13/2018), 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    Surgical History  He has a past surgical history that includes Ankle surgery (04/17/2013); Knee surgery (04/17/2013); and Hernia repair (04/17/2013).     Social History  He reports that he has never smoked. He has never used smokeless tobacco. He reports that he does not currently use alcohol. He reports that he does not use drugs.    Family History  No family history on file.     Allergies  Ciprofloxacin, Levofloxacin, and Penicillins    Review of Systems  At least 10 systems reviewed and negative except as described in the HPI     Physical Exam  Lower extremity: Tenderness over the medial joint line.  No appreciable effusion.  No increased  "warmth or erythema about the knee.  No open wounds over the knee.  Active range of motion from 0 degrees to 90 degrees.  Stable knee to ligamentous testing.  Able to plantarflex and dorsiflex his ankle.  Warm well-perfused foot.     Last Recorded Vitals  Blood pressure 136/78, pulse 82, temperature 36.8 °C (98.2 °F), temperature source Oral, resp. rate 18, height 1.702 m (5' 7.01\"), weight 92.1 kg (203 lb), SpO2 99 %.      Assessment/Plan     Left knee pain and weakness of the left lower extremity.  Currently being worked up for possible stroke.  There is no appreciable effusion in the knee.  No significant increased warmth or any erythema.  He has range of motion from 0 to 90 degrees.  He has a strong history of gout in the past per the patient.  Low suspicion at this time for septic joint.  Recommend continuing medications for an acute gout attack.  Primary is ordering a uric acid level.  Please reconsult orthopedics for any concerns or worsening symptoms.      Andrea Mcfarland MD    "
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
## 2024-01-06 NOTE — CARE PLAN
Problem: Safety  Goal: Patient will be injury free during hospitalization  Outcome: Progressing   The patient's goals for the shift include      The clinical goals for the shift include monitor labs /vitals

## 2024-01-06 NOTE — CARE PLAN
The patient's goals for the shift include      The clinical goals for the shift include monitor labs /vitals    O  Problem: Fall/Injury  Goal: Not fall by end of shift  Outcome: Progressing     Problem: Fall/Injury  Goal: Verbalize understanding of risk factor reduction measures to prevent injury from fall in the home  Outcome: Progressing     Problem: Skin  Goal: Prevent/minimize sheer/friction injuries  Outcome: Progressing     Problem: Pain  Goal: My pain/discomfort is manageable  Outcome: Progressing

## 2024-01-06 NOTE — PROGRESS NOTES
"HOSPITALIST  PROGRESS NOTE   Eugenio Bliss    :  1955    Medical Record:  [id number]    DATE OF SERVICE: 2024  ADMIT DAY: 0.    Subjective:  Eugenio Bliss is a 68 y.o. year-old male who was admitted on 2024 for worsening bilateral leg weakness left greater than right concerning for possible CVA.  The patient's labs, imaging studies and vital signs are noted with the case discussed with the nursing staff. The patient was seen and examined and the chart was reviewed. The patient is being seen for management of their BP along with the pt's other medical conditions. Today pt reports \"left knee still hurts and is numb\" but he denies any F/C/CP/SOB/N/V/D/Abd pain.    Objective:  Vitals:    24 0814   BP: 122/78   Pulse: 95   Resp: 20   Temp: 36.6 °C (97.9 °F)   SpO2: 99%        I/O last 3 completed shifts:  In: 2751.7 (29.9 mL/kg) [P.O.:620; I.V.:781.7 (8.5 mL/kg); IV Piggyback:1350]  Out: 1150 (12.5 mL/kg) [Urine:1150 (0.3 mL/kg/hr)]  Weight: 92.1 kg   No intake/output data recorded.  Pulmonary:RA    General: Male, A&Ox3, in no acute distress, and is following commands.  HEENT: Normocephalic atraumatic, pupils equally reactive to light and accomodation, extra occular muscles are intact. Neck is supple, trachea is midline without observable bruits.  CVS: Regular rate and rhythm, S1 S2 without any S3 or S4.  Pulmonary: Decreased breath sounds with occasional rales and trace rhonchi.  GI: Abdomen is soft, non tender, positive bowel sounds are present.  EXT: B/L LE peripheral edema 1/4 with pulses are palpable throughout.  There is more pronounced left knee tenderness and erythema  NEUROLOGY: CN 3-12 grossly intact except for known chronic visual impairments, Muscle strength is 4/5, DTRs are 2/4 throughout with more pronounced left leg weakness.  There are movements consistent with known history of tardive dyskinesia    LABS:  Results for orders placed or performed during the hospital encounter of " 01/05/24 (from the past 24 hour(s))   Urinalysis with Reflex Microscopic   Result Value Ref Range    Color, Urine Yellow Light-Yellow, Yellow, Dark-Yellow    Appearance, Urine Clear Clear    Specific Gravity, Urine 1.031 1.005 - 1.035    pH, Urine 6.5 5.0, 5.5, 6.0, 6.5, 7.0, 7.5, 8.0    Protein, Urine 100 (2+) (A) NEGATIVE, 10 (TRACE), 20 (TRACE) mg/dL    Glucose, Urine Normal Normal mg/dL    Blood, Urine 0.03 (TRACE) (A) NEGATIVE    Ketones, Urine 20 (1+) (A) NEGATIVE mg/dL    Bilirubin, Urine NEGATIVE NEGATIVE    Urobilinogen, Urine 2 (1+) (A) Normal mg/dL    Nitrite, Urine NEGATIVE NEGATIVE    Leukocyte Esterase, Urine NEGATIVE NEGATIVE   Microscopic Only, Urine   Result Value Ref Range    WBC, Urine 1-5 1-5, NONE /HPF    RBC, Urine 3-5 NONE, 1-2, 3-5 /HPF   POCT GLUCOSE   Result Value Ref Range    POCT Glucose 130 (H) 74 - 99 mg/dL   Uric Acid   Result Value Ref Range    Uric Acid 7.6 3.6 - 7.7 mg/dL   BLOOD GAS LACTIC ACID, VENOUS   Result Value Ref Range    POCT Lactate, Venous 1.4 0.4 - 2.0 mmol/L   Sedimentation rate, automated   Result Value Ref Range    Sedimentation Rate 53 (H) 0 - 20 mm/h   C-reactive protein   Result Value Ref Range    C-Reactive Protein 14.17 (H) 0.00 - 2.00 mg/dL   POCT GLUCOSE   Result Value Ref Range    POCT Glucose 149 (H) 74 - 99 mg/dL        Susceptibility data from last 90 days.  Collected Specimen Info Organism Amoxicillin/Clavulanate Ampicillin Ampicillin/Sulbactam Cefazolin Cefazolin (uncomplicated UTIs only) Ciprofloxacin Gentamicin Nitrofurantoin Piperacillin/Tazobactam Trimethoprim/Sulfamethoxazole   11/29/23 Urine from Clean Catch/Voided Klebsiella pneumoniae/variicola S R S S S S S I S S          MEDICATIONS:  Scheduled medications  acetaminophen, 650 mg, oral, 4x daily  allopurinol, 100 mg, oral, Daily  apixaban, 5 mg, oral, BID  ascorbic acid, 500 mg, oral, BID  busPIRone, 10 mg, oral, BID  cefTRIAXone, 2 g, intravenous, q24h  cholecalciferol, 5,000 Units, oral,  Daily  cyanocobalamin, 1,000 mcg, oral, Daily  dexAMETHasone, 4 mg, oral, q8h ANNAMARIE  diazePAM, 10 mg, oral, Once  gabapentin, 100 mg, oral, TID  hydroxychloroquine, 200 mg, oral, Daily  hydrOXYzine HCL, 25 mg, oral, Nightly  insulin lispro, 0-10 Units, subcutaneous, TID with meals  levothyroxine, 50 mcg, oral, Daily before breakfast  losartan, 75 mg, oral, Daily  lurasidone, 40 mg, oral, Daily  metFORMIN, 500 mg, oral, Daily with breakfast  metoprolol tartrate, 100 mg, oral, BID  multivitamin, 1 tablet, oral, Daily  nitrofurantoin (macrocrystal-monohydrate), 100 mg, oral, Daily  pantoprazole, 20 mg, oral, Daily  traZODone, 100 mg, oral, Nightly  vilazodone, 40 mg, oral, Daily with breakfast      Continuous medications  lactated Ringer's, 50 mL/hr, Last Rate: 100 mL/hr (01/05/24 2211)      PRN medications  PRN medications: dextrose 10 % in water (D10W), dextrose, glucagon, oxyCODONE    PERTINENT IMAGING STUDIES/PROCEDURES:  MR brain wo IV contrast    Result Date: 1/6/2024  Interpreted By:  Arelis Lara, STUDY: MR BRAIN WO IV CONTRAST; 1/5/2024 9:34 pm   INDICATION: Signs/Symptoms:TIA Hx CVA;   COMPARISON: CT head dated 01/05/2024   ACCESSION NUMBER(S): [id number]   ORDERING CLINICIAN: SIMON HECK   TECHNIQUE: Multiple, multiplanar sequences of the brain were acquired.   FINDINGS: No focal mass effect or midline shift is identified. The ventricles and sulci are symmetric and appropriate for the patient's age.   The gray-white matter differentiation is preserved. No restricted diffusion is seen.   No acute intracranial hemorrhage is seen. No intra-axial or extra-axial fluid collection is seen.   The visualized paranasal sinuses and mastoid air cells are clear.       No acute intracranial findings.   MACRO: None.   Signed by: Arelis Lara 1/6/2024 8:00 AM Dictation workstation:   [id number]    CT angio head and neck w and wo IV contrast    Result Date: 1/5/2024  Interpreted By:  Arnie Zamarripa, STUDY:  CT ANGIO HEAD AND NECK W AND WO IV CONTRAST; 1/5/2024 8:43 am   INDICATION: Signs/Symptoms:Left-sided numbness and weakness;   COMPARISON: CT head same day /   ACCESSION NUMBER(S): [id number]   ORDERING CLINICIAN: SANDI TRENT   TECHNIQUE: CT arteriogram of the head and neck with 100 ml of Omnipaque 350 was injected intravenously. 3D MIP images were created, processed, and interpreted on an independent workstation. All CT examinations are performed with 1 or more of the following dose reduction techniques: Automated exposure control, adjustment of mA and/or kv according to patient's size, or use of iterative reconstruction techniques.   FINDINGS: CTA HEAD:   Basilar: Patent without aneurysm, dissection or thrombus. PCOM: Right posterior communicating artery is present with fetal origin of the right posterior cerebral artery. A left posterior communicating artery is not present. ACOM: Patent without aneurysm, dissection or thrombus.   LEFT:   MCA: Patent without aneurysm, dissection or thrombus. PAXTON: Patent without aneurysm, dissection or thrombus. PCA: Patent without aneurysm, dissection or thrombus. ICA: Patent without aneurysm, dissection or thrombus. VERT: Patent without aneurysm, dissection or thrombus.   RIGHT:   MCA: Patent without aneurysm, dissection or thrombus. PAXTON: Patent without aneurysm, dissection or thrombus. PCA: Patent without aneurysm, dissection or thrombus. ICA: Patent without aneurysm, dissection or thrombus. VERT: Patent without aneurysm, dissection or thrombus.     CTA NECK:     The estimate of the degree of stenosis included in this report is based on the NASCET CRITERIA for calculating stenosis by using the internal carotid artery distal to the stenosis as the reference point. Normal is no stenosis. Mild is less than 50% stenosis. Moderate is 50-69% stenosis. Severe is 70-99% stenosis. Total occlusion is no detectable patent lumen.   LEFT:   CCA: Patent without aneurysm, dissection or  thrombus. Vascular calcifications at the carotid bifurcation without hemodynamically significant stenosis. ICA: Patent without aneurysm, dissection or thrombus. ECA: Patent without aneurysm, dissection or thrombus. VERT: Patent without aneurysm, dissection or thrombus.     RIGHT:   CCA: Patent without aneurysm, dissection or thrombus. Vascular calcifications at the carotid bifurcation without hemodynamically significant stenosis. ICA: Patent without aneurysm, dissection or thrombus. ECA: Patent without aneurysm, dissection or thrombus. VERT: Patent without aneurysm, dissection or thrombus.   AORTIC ARCH AND BRANCHES: Patent without aneurysm, dissection or thrombus.   Degenerative disc disease spondylosis of the cervical spine is seen.           CT angiogram of the neck and head shows no evidence for acute abnormality. No evidence for hemodynamically significant stenosis.   Signed by: Arnie Zamarripa 1/5/2024 10:39 AM Dictation workstation:   [id number]    ECG 12 lead 1/5/2024  Poor data quality, interpretation may be adversely affected Sinus rhythm with Premature atrial complexes Left axis deviation Inferior infarct , age undetermined Possible Anterior infarct (cited on or before 19-AUG-2020) Abnormal ECG When compared with ECG of 01-OCT-2023 10:40, ST no longer depressed in Inferior leads Nonspecific T wave abnormality now evident in Lateral leads Confirmed by David Perez ([id number]) on 1/5/2024 8:31:40 AM    XR chest 1 view    Result Date: 1/5/2024  Interpreted By:  Arnie Zamarripa, STUDY: XR CHEST 1 VIEW; 1/5/2024 7:45 am   INDICATION: Signs/Symptoms:Stroke.   COMPARISON: 10/01/2023   ACCESSION NUMBER(S): [id number]   ORDERING CLINICIAN: SANDI TRENT   TECHNIQUE: 1 view of the chest was performed.   FINDINGS: The lungs are adequately inflated. No acute consolidation. No pleural effusion. No pneumothorax.  The cardiomediastinal silhouette is within normal limits.       No acute cardiopulmonary disease.    Signed by: Arnie Zamarripa 1/5/2024 8:18 AM Dictation workstation:   [id number]    CT brain attack head wo IV contrast    Result Date: 1/5/2024  STUDY: CT Head without IV Contrast; 01/05/2023 7:08 AM INDICATION: Stroke. COMPARISON: MR brain & CT head 10/01/2023. ACCESSION NUMBER(S): [id number] ORDERING CLINICIAN: SANDI TRENT TECHNIQUE: Noncontrast axial CT scan of head was performed.  Angled reformats in brain and bone windows were generated.  The images were reviewed in bone, brain, blood and soft tissue windows. Automated mA/kV exposure control was utilized and patient examination was performed in strict accordance with principles of ALARA. FINDINGS: CSF Spaces:  The ventricles, sulci and basal cisterns are within normal limits for age.  There is no extraaxial fluid collection.  Mild patchy areas of low-attenuation are seen in the subcortical and deep periventricular white matter suggesting areas of chronic microvascular ischemia.  Parenchyma:  The grey-white differentiation is intact.  There is no mass effect or midline shift.  There is no intracranial hemorrhage.  Calvarium:  The calvarium is unremarkable.  Paranasal sinuses and mastoids:  Moderate sized mucosal retention polyp in the left maxillary sinus.    No acute intracranial process. Signed by Jose L Alcantara MD    Assessment:  Eugenio Bliss is a 68 y.o. year-old male @ on admission for 0 days    Acute on chronic B/L LE weakness L>R with known history of spinal stenosis  Left knee tenderness-doubt septic joint versus possible gout  Chronic A-fib-on Eliquis  Hypothyroidism  Essential hypertension  GERD  Vitamin B12 deficiency  Peripheral neuropathy  Diabetes mellitus type 2  Known lumbar radiculopathy  Known tardive dyskinesia  Deconditioning and impaired mobility    ID:  -Case discussed with orthopedic surgeon, suspicion for septic knee is low but will continue IV Rocephin.  Will check uric acid level as patient has known history of gout and  continue allopurinol with patient also on Decadron.  -Patient also resumed on his Macrobid      PSYCHIATRY/NEUROLOGY:   -Stroke workup including MRI of the brain so far negative.  Await 2D echo and neurology consult.  Will check lipid panel and will defer to neurology if patient needs any more antiplatelet therapy since patient already on Eliquis    CARDIOLOGY:  -BP is being monitored for now on the patient's current medications (Cozaar and Lopressor) with repeat labs ordered in the am.  Continue IV fluids as well but at a lower rate.      ENDOCRINOLOGY:     Accu-Cheks are being watched closely and patient resumed on Glucophage.  Continue ISS.     GI:   Protonix     HEMATOLOGY:  -CBC noted no need for transfusions unless pt develops severe bleeding, platelet counts less than 10,000 or Hgb becomes < 7.        MUSCULOSKELETAL:     1- generalized weakness:  PT&OT      OTHER:    -The patient continues to be on the rest of their chronic home medications for depression, peripheral neuropathy, etc.  Will update family once available.   -Discharge planner is on the case with PT and OT following as well.  Possible discharge tomorrow once stroke workup has been completed and is negative.    *CODE STATUS: DNR and no intubation     *DVT prophylaxis: Eliquis     *PUD PROPHYLAXIS:  Protonix     Time spent managing patient's care is greater than 40 minutes with approximately 50% or more spent in counseling and coordination of care.    Adrien ARCE    Portions of this Progress note were dictated using [employer] and reviewed . While every effort was made to correct mistranscriptions, minor grammatical or typographical errors may be present from machine dictation

## 2024-01-07 ENCOUNTER — APPOINTMENT (OUTPATIENT)
Dept: RADIOLOGY | Facility: HOSPITAL | Age: 69
DRG: 554 | End: 2024-01-07
Payer: MEDICARE

## 2024-01-07 LAB
ANION GAP SERPL CALC-SCNC: 10 MMOL/L
BACTERIA UR CULT: NORMAL
BUN SERPL-MCNC: 26 MG/DL (ref 8–25)
CALCIUM SERPL-MCNC: 9.4 MG/DL (ref 8.5–10.4)
CHLORIDE SERPL-SCNC: 104 MMOL/L (ref 97–107)
CO2 SERPL-SCNC: 23 MMOL/L (ref 24–31)
CREAT SERPL-MCNC: 0.7 MG/DL (ref 0.4–1.6)
CRP SERPL-MCNC: 10.5 MG/DL (ref 0–2)
ERYTHROCYTE [DISTWIDTH] IN BLOOD BY AUTOMATED COUNT: 13.3 % (ref 11.5–14.5)
GFR SERPL CREATININE-BSD FRML MDRD: >90 ML/MIN/1.73M*2
GLUCOSE BLD MANUAL STRIP-MCNC: 146 MG/DL (ref 74–99)
GLUCOSE BLD MANUAL STRIP-MCNC: 154 MG/DL (ref 74–99)
GLUCOSE BLD MANUAL STRIP-MCNC: 165 MG/DL (ref 74–99)
GLUCOSE BLD MANUAL STRIP-MCNC: 177 MG/DL (ref 74–99)
GLUCOSE SERPL-MCNC: 160 MG/DL (ref 65–99)
HCT VFR BLD AUTO: 35.5 % (ref 41–52)
HGB BLD-MCNC: 11.7 G/DL (ref 13.5–17.5)
MAGNESIUM SERPL-MCNC: 1.8 MG/DL (ref 1.6–3.1)
MCH RBC QN AUTO: 30.4 PG (ref 26–34)
MCHC RBC AUTO-ENTMCNC: 33 G/DL (ref 32–36)
MCV RBC AUTO: 92 FL (ref 80–100)
NRBC BLD-RTO: 0 /100 WBCS (ref 0–0)
PHOSPHATE SERPL-MCNC: 3.1 MG/DL (ref 2.5–4.5)
PLATELET # BLD AUTO: 134 X10*3/UL (ref 150–450)
POTASSIUM SERPL-SCNC: 4.2 MMOL/L (ref 3.4–5.1)
RBC # BLD AUTO: 3.85 X10*6/UL (ref 4.5–5.9)
SODIUM SERPL-SCNC: 137 MMOL/L (ref 133–145)
TSH SERPL DL<=0.05 MIU/L-ACNC: 0.9 MIU/L (ref 0.27–4.2)
URATE SERPL-MCNC: 6.6 MG/DL (ref 3.6–7.7)
WBC # BLD AUTO: 4.8 X10*3/UL (ref 4.4–11.3)

## 2024-01-07 PROCEDURE — 2500000001 HC RX 250 WO HCPCS SELF ADMINISTERED DRUGS (ALT 637 FOR MEDICARE OP): Performed by: INTERNAL MEDICINE

## 2024-01-07 PROCEDURE — 2500000004 HC RX 250 GENERAL PHARMACY W/ HCPCS (ALT 636 FOR OP/ED): Performed by: INTERNAL MEDICINE

## 2024-01-07 PROCEDURE — 85027 COMPLETE CBC AUTOMATED: CPT | Performed by: INTERNAL MEDICINE

## 2024-01-07 PROCEDURE — 84100 ASSAY OF PHOSPHORUS: CPT | Performed by: INTERNAL MEDICINE

## 2024-01-07 PROCEDURE — 86140 C-REACTIVE PROTEIN: CPT | Performed by: INTERNAL MEDICINE

## 2024-01-07 PROCEDURE — 36415 COLL VENOUS BLD VENIPUNCTURE: CPT | Performed by: INTERNAL MEDICINE

## 2024-01-07 PROCEDURE — 73562 X-RAY EXAM OF KNEE 3: CPT | Mod: 50

## 2024-01-07 PROCEDURE — 80048 BASIC METABOLIC PNL TOTAL CA: CPT | Performed by: INTERNAL MEDICINE

## 2024-01-07 PROCEDURE — 84443 ASSAY THYROID STIM HORMONE: CPT | Performed by: INTERNAL MEDICINE

## 2024-01-07 PROCEDURE — 2500000002 HC RX 250 W HCPCS SELF ADMINISTERED DRUGS (ALT 637 FOR MEDICARE OP, ALT 636 FOR OP/ED): Performed by: INTERNAL MEDICINE

## 2024-01-07 PROCEDURE — 2500000005 HC RX 250 GENERAL PHARMACY W/O HCPCS: Performed by: INTERNAL MEDICINE

## 2024-01-07 PROCEDURE — 83735 ASSAY OF MAGNESIUM: CPT | Performed by: INTERNAL MEDICINE

## 2024-01-07 PROCEDURE — 84550 ASSAY OF BLOOD/URIC ACID: CPT | Performed by: INTERNAL MEDICINE

## 2024-01-07 PROCEDURE — 82947 ASSAY GLUCOSE BLOOD QUANT: CPT

## 2024-01-07 PROCEDURE — G0378 HOSPITAL OBSERVATION PER HR: HCPCS

## 2024-01-07 RX ORDER — DEXAMETHASONE 4 MG/1
4 TABLET ORAL EVERY 12 HOURS SCHEDULED
Status: DISCONTINUED | OUTPATIENT
Start: 2024-01-07 | End: 2024-01-09 | Stop reason: HOSPADM

## 2024-01-07 RX ORDER — TALC
6 POWDER (GRAM) TOPICAL NIGHTLY PRN
Status: DISCONTINUED | OUTPATIENT
Start: 2024-01-07 | End: 2024-01-09 | Stop reason: HOSPADM

## 2024-01-07 RX ORDER — LOSARTAN POTASSIUM 100 MG/1
100 TABLET ORAL DAILY
Status: DISCONTINUED | OUTPATIENT
Start: 2024-01-08 | End: 2024-01-09 | Stop reason: HOSPADM

## 2024-01-07 RX ADMIN — METOPROLOL TARTRATE 75 MG: 25 TABLET, FILM COATED ORAL at 20:36

## 2024-01-07 RX ADMIN — METFORMIN HYDROCHLORIDE 500 MG: 500 TABLET, FILM COATED ORAL at 08:35

## 2024-01-07 RX ADMIN — CYANOCOBALAMIN TAB 1000 MCG 1000 MCG: 1000 TAB at 08:35

## 2024-01-07 RX ADMIN — OXYCODONE HYDROCHLORIDE 5 MG: 5 TABLET ORAL at 17:54

## 2024-01-07 RX ADMIN — ACETAMINOPHEN 650 MG: 325 TABLET ORAL at 20:37

## 2024-01-07 RX ADMIN — LEVOTHYROXINE SODIUM 50 MCG: 0.05 TABLET ORAL at 05:19

## 2024-01-07 RX ADMIN — DEXAMETHASONE 4 MG: 4 TABLET ORAL at 05:19

## 2024-01-07 RX ADMIN — APIXABAN 5 MG: 5 TABLET, FILM COATED ORAL at 20:37

## 2024-01-07 RX ADMIN — GABAPENTIN 100 MG: 100 CAPSULE ORAL at 08:34

## 2024-01-07 RX ADMIN — DEXAMETHASONE 4 MG: 4 TABLET ORAL at 14:30

## 2024-01-07 RX ADMIN — LOSARTAN POTASSIUM 75 MG: 50 TABLET, FILM COATED ORAL at 08:34

## 2024-01-07 RX ADMIN — BUSPIRONE HYDROCHLORIDE 10 MG: 10 TABLET ORAL at 08:35

## 2024-01-07 RX ADMIN — GABAPENTIN 100 MG: 100 CAPSULE ORAL at 14:30

## 2024-01-07 RX ADMIN — ACETAMINOPHEN 650 MG: 325 TABLET ORAL at 06:00

## 2024-01-07 RX ADMIN — LURASIDONE HYDROCHLORIDE 40 MG: 40 TABLET, FILM COATED ORAL at 08:34

## 2024-01-07 RX ADMIN — NITROFURANTOIN MONOHYDRATE/MACROCRYSTALS 100 MG: 75; 25 CAPSULE ORAL at 05:19

## 2024-01-07 RX ADMIN — GABAPENTIN 100 MG: 100 CAPSULE ORAL at 20:36

## 2024-01-07 RX ADMIN — OXYCODONE HYDROCHLORIDE AND ACETAMINOPHEN 500 MG: 500 TABLET ORAL at 20:37

## 2024-01-07 RX ADMIN — HYDROXYZINE HYDROCHLORIDE 25 MG: 25 TABLET, FILM COATED ORAL at 20:37

## 2024-01-07 RX ADMIN — ALLOPURINOL 100 MG: 100 TABLET ORAL at 08:35

## 2024-01-07 RX ADMIN — Medication 125 MCG: at 08:35

## 2024-01-07 RX ADMIN — HYDROXYCHLOROQUINE SULFATE 200 MG: 200 TABLET ORAL at 08:34

## 2024-01-07 RX ADMIN — BUSPIRONE HYDROCHLORIDE 10 MG: 10 TABLET ORAL at 20:36

## 2024-01-07 RX ADMIN — ACETAMINOPHEN 650 MG: 325 TABLET ORAL at 17:54

## 2024-01-07 RX ADMIN — OXYCODONE HYDROCHLORIDE AND ACETAMINOPHEN 500 MG: 500 TABLET ORAL at 08:35

## 2024-01-07 RX ADMIN — MULTIVITAMIN TABLET 1 TABLET: TABLET at 08:35

## 2024-01-07 RX ADMIN — ACETAMINOPHEN 650 MG: 325 TABLET ORAL at 12:56

## 2024-01-07 RX ADMIN — LIDOCAINE 1 PATCH: 4 PATCH TOPICAL at 08:35

## 2024-01-07 RX ADMIN — TRAZODONE HYDROCHLORIDE 100 MG: 100 TABLET ORAL at 20:37

## 2024-01-07 RX ADMIN — CEFTRIAXONE SODIUM 2 G: 2 INJECTION, SOLUTION INTRAVENOUS at 20:36

## 2024-01-07 RX ADMIN — APIXABAN 5 MG: 5 TABLET, FILM COATED ORAL at 08:35

## 2024-01-07 RX ADMIN — PANTOPRAZOLE SODIUM 20 MG: 20 TABLET, DELAYED RELEASE ORAL at 08:35

## 2024-01-07 RX ADMIN — DEXAMETHASONE 4 MG: 4 TABLET ORAL at 21:00

## 2024-01-07 ASSESSMENT — COGNITIVE AND FUNCTIONAL STATUS - GENERAL
TURNING FROM BACK TO SIDE WHILE IN FLAT BAD: A LITTLE
CLIMB 3 TO 5 STEPS WITH RAILING: A LOT
DRESSING REGULAR UPPER BODY CLOTHING: A LITTLE
TURNING FROM BACK TO SIDE WHILE IN FLAT BAD: A LITTLE
MOVING FROM LYING ON BACK TO SITTING ON SIDE OF FLAT BED WITH BEDRAILS: A LITTLE
DAILY ACTIVITIY SCORE: 16
HELP NEEDED FOR BATHING: A LITTLE
TOILETING: A LOT
EATING MEALS: A LITTLE
DRESSING REGULAR UPPER BODY CLOTHING: A LITTLE
MOVING TO AND FROM BED TO CHAIR: A LITTLE
HELP NEEDED FOR BATHING: A LITTLE
MOVING TO AND FROM BED TO CHAIR: A LITTLE
DAILY ACTIVITIY SCORE: 16
MOBILITY SCORE: 17
DRESSING REGULAR LOWER BODY CLOTHING: A LOT
MOVING FROM LYING ON BACK TO SITTING ON SIDE OF FLAT BED WITH BEDRAILS: A LITTLE
EATING MEALS: A LITTLE
PERSONAL GROOMING: A LITTLE
DRESSING REGULAR LOWER BODY CLOTHING: A LOT
TOILETING: A LOT
PERSONAL GROOMING: A LITTLE
WALKING IN HOSPITAL ROOM: A LITTLE
STANDING UP FROM CHAIR USING ARMS: A LITTLE
MOBILITY SCORE: 17
CLIMB 3 TO 5 STEPS WITH RAILING: A LOT
WALKING IN HOSPITAL ROOM: A LITTLE
STANDING UP FROM CHAIR USING ARMS: A LITTLE

## 2024-01-07 ASSESSMENT — PAIN SCALES - GENERAL
PAINLEVEL_OUTOF10: 4
PAINLEVEL_OUTOF10: 8
PAINLEVEL_OUTOF10: 4

## 2024-01-07 ASSESSMENT — PAIN - FUNCTIONAL ASSESSMENT: PAIN_FUNCTIONAL_ASSESSMENT: 0-10

## 2024-01-07 NOTE — PROGRESS NOTES
"HOSPITALIST  PROGRESS NOTE   Eugenio Bliss    :  1955    Medical Record:  [id number]    DATE OF SERVICE: 2024  ADMIT DAY: 0.    Subjective:  Eugenio Bliss is a 68 y.o. year-old male who was admitted on 2024 for worsening bilateral leg weakness left greater than right concerning for possible CVA.  The patient's labs, imaging studies and vital signs are noted with the case discussed with the nursing staff. The patient was seen and examined and the chart was reviewed. The patient is being seen for management of their BP along with the pt's other medical conditions. Today pt denies any F/C/CP/SOB/N/V/D/Abd pain but he reports \"still feeling weak\".    Objective:  Vitals:    24 1100   BP: 136/70   Pulse: 63   Resp: 18   Temp: 36.6 °C (97.9 °F)   SpO2: 100%        I/O last 3 completed shifts:  In: 3271.7 (35.5 mL/kg) [P.O.:1140; I.V.:781.7 (8.5 mL/kg); IV Piggyback:1350]  Out: 2450 (26.6 mL/kg) [Urine:2450 (0.7 mL/kg/hr)]  Weight: 92.1 kg   I/O this shift:  In: 1000 [P.O.:1000]  Out: 400 [Urine:400]  Pulmonary:RA    General: Male, A&Ox3, in no acute distress, and is following commands.  HEENT: Normocephalic atraumatic, pupils equally reactive to light and accomodation, extra occular muscles are intact. Neck is supple, trachea is midline without observable bruits.  CVS: Regular rate and rhythm, S1 S2 without any S3 or S4.  Pulmonary: Decreased breath sounds with occasional rales and trace rhonchi.  GI: Abdomen is soft, non tender, positive bowel sounds are present.  EXT: B/L LE peripheral edema 1/4 with pulses are palpable throughout.  There is more pronounced left knee tenderness with associated mild erythema  NEUROLOGY: CN 3-12 grossly intact except for known chronic visual impairments, Muscle strength is 4/5, DTRs are 2/4 throughout with more pronounced left leg weakness.  There are movements consistent with known history of tardive dyskinesia    LABS:  Results for orders placed or performed during " the hospital encounter of 01/05/24 (from the past 24 hour(s))   POCT GLUCOSE   Result Value Ref Range    POCT Glucose 172 (H) 74 - 99 mg/dL   POCT GLUCOSE   Result Value Ref Range    POCT Glucose 194 (H) 74 - 99 mg/dL   Thyroid Stimulating Hormone   Result Value Ref Range    Thyroid Stimulating Hormone 0.90 0.27 - 4.20 mIU/L   C-reactive protein   Result Value Ref Range    C-Reactive Protein 10.50 (H) 0.00 - 2.00 mg/dL   Phosphorus   Result Value Ref Range    Phosphorus 3.1 2.5 - 4.5 mg/dL   Magnesium   Result Value Ref Range    Magnesium 1.80 1.60 - 3.10 mg/dL   Basic Metabolic Panel   Result Value Ref Range    Glucose 160 (H) 65 - 99 mg/dL    Sodium 137 133 - 145 mmol/L    Potassium 4.2 3.4 - 5.1 mmol/L    Chloride 104 97 - 107 mmol/L    Bicarbonate 23 (L) 24 - 31 mmol/L    Urea Nitrogen 26 (H) 8 - 25 mg/dL    Creatinine 0.70 0.40 - 1.60 mg/dL    eGFR >90 >60 mL/min/1.73m*2    Calcium 9.4 8.5 - 10.4 mg/dL    Anion Gap 10 <=19 mmol/L   CBC   Result Value Ref Range    WBC 4.8 4.4 - 11.3 x10*3/uL    nRBC 0.0 0.0 - 0.0 /100 WBCs    RBC 3.85 (L) 4.50 - 5.90 x10*6/uL    Hemoglobin 11.7 (L) 13.5 - 17.5 g/dL    Hematocrit 35.5 (L) 41.0 - 52.0 %    MCV 92 80 - 100 fL    MCH 30.4 26.0 - 34.0 pg    MCHC 33.0 32.0 - 36.0 g/dL    RDW 13.3 11.5 - 14.5 %    Platelets 134 (L) 150 - 450 x10*3/uL   Uric Acid   Result Value Ref Range    Uric Acid 6.6 3.6 - 7.7 mg/dL   POCT GLUCOSE   Result Value Ref Range    POCT Glucose 154 (H) 74 - 99 mg/dL   POCT GLUCOSE   Result Value Ref Range    POCT Glucose 146 (H) 74 - 99 mg/dL        Susceptibility data from last 90 days.  Collected Specimen Info Organism Amoxicillin/Clavulanate Ampicillin Ampicillin/Sulbactam Cefazolin Cefazolin (uncomplicated UTIs only) Ciprofloxacin Gentamicin Nitrofurantoin Piperacillin/Tazobactam Trimethoprim/Sulfamethoxazole   11/29/23 Urine from Clean Catch/Voided Klebsiella pneumoniae/variicola S R S S S S S I S S            MEDICATIONS:  Scheduled  medications  acetaminophen, 650 mg, oral, 4x daily  allopurinol, 100 mg, oral, Daily  apixaban, 5 mg, oral, BID  ascorbic acid, 500 mg, oral, BID  busPIRone, 10 mg, oral, BID  cefTRIAXone, 2 g, intravenous, q24h  cholecalciferol, 5,000 Units, oral, Daily  cyanocobalamin, 1,000 mcg, oral, Daily  dexAMETHasone, 4 mg, oral, q8h ANNAMARIE  diazePAM, 10 mg, oral, Once  gabapentin, 100 mg, oral, TID  hydroxychloroquine, 200 mg, oral, Daily  hydrOXYzine HCL, 25 mg, oral, Nightly  insulin lispro, 0-10 Units, subcutaneous, TID with meals  levothyroxine, 50 mcg, oral, Daily before breakfast  lidocaine, 1 patch, transdermal, Daily  losartan, 75 mg, oral, Daily  lurasidone, 40 mg, oral, Daily  metFORMIN, 500 mg, oral, Daily with breakfast  metoprolol tartrate, 100 mg, oral, BID  multivitamin, 1 tablet, oral, Daily  nitrofurantoin (macrocrystal-monohydrate), 100 mg, oral, Daily  pantoprazole, 20 mg, oral, Daily  perflutren lipid microspheres, 0.5-10 mL of dilution, intravenous, Once in imaging  perflutren protein A microsphere, 0.5 mL, intravenous, Once in imaging  sulfur hexafluoride microsphr, 2 mL, intravenous, Once in imaging  traZODone, 100 mg, oral, Nightly  vilazodone, 40 mg, oral, Daily with breakfast      Continuous medications  lactated Ringer's, 50 mL/hr, Last Rate: 50 mL/hr (01/06/24 1124)      PRN medications  PRN medications: dextrose 10 % in water (D10W), dextrose, glucagon, oxyCODONE    PERTINENT IMAGING STUDIES/PROCEDURES:  MR brain wo IV contrast    Result Date: 1/6/2024  Interpreted By:  Arelis Lara, STUDY: MR BRAIN WO IV CONTRAST; 1/5/2024 9:34 pm   INDICATION: Signs/Symptoms:TIA Hx CVA;   COMPARISON: CT head dated 01/05/2024   ACCESSION NUMBER(S): [id number]   ORDERING CLINICIAN: SIMON HECK   TECHNIQUE: Multiple, multiplanar sequences of the brain were acquired.   FINDINGS: No focal mass effect or midline shift is identified. The ventricles and sulci are symmetric and appropriate for the patient's  age.   The gray-white matter differentiation is preserved. No restricted diffusion is seen.   No acute intracranial hemorrhage is seen. No intra-axial or extra-axial fluid collection is seen.   The visualized paranasal sinuses and mastoid air cells are clear.       No acute intracranial findings.   MACRO: None.   Signed by: Arelis Lara 1/6/2024 8:00 AM Dictation workstation:   [id number]    CT angio head and neck w and wo IV contrast    Result Date: 1/5/2024  Interpreted By:  Arnie Zamarripa, STUDY: CT ANGIO HEAD AND NECK W AND WO IV CONTRAST; 1/5/2024 8:43 am   INDICATION: Signs/Symptoms:Left-sided numbness and weakness;   COMPARISON: CT head same day /   ACCESSION NUMBER(S): [id number]   ORDERING CLINICIAN: SANDI TRENT   TECHNIQUE: CT arteriogram of the head and neck with 100 ml of Omnipaque 350 was injected intravenously. 3D MIP images were created, processed, and interpreted on an independent workstation. All CT examinations are performed with 1 or more of the following dose reduction techniques: Automated exposure control, adjustment of mA and/or kv according to patient's size, or use of iterative reconstruction techniques.   FINDINGS: CTA HEAD:   Basilar: Patent without aneurysm, dissection or thrombus. PCOM: Right posterior communicating artery is present with fetal origin of the right posterior cerebral artery. A left posterior communicating artery is not present. ACOM: Patent without aneurysm, dissection or thrombus.   LEFT:   MCA: Patent without aneurysm, dissection or thrombus. PAXTON: Patent without aneurysm, dissection or thrombus. PCA: Patent without aneurysm, dissection or thrombus. ICA: Patent without aneurysm, dissection or thrombus. VERT: Patent without aneurysm, dissection or thrombus.   RIGHT:   MCA: Patent without aneurysm, dissection or thrombus. PAXTON: Patent without aneurysm, dissection or thrombus. PCA: Patent without aneurysm, dissection or thrombus. ICA: Patent without aneurysm,  dissection or thrombus. VERT: Patent without aneurysm, dissection or thrombus.     CTA NECK:     The estimate of the degree of stenosis included in this report is based on the NASCET CRITERIA for calculating stenosis by using the internal carotid artery distal to the stenosis as the reference point. Normal is no stenosis. Mild is less than 50% stenosis. Moderate is 50-69% stenosis. Severe is 70-99% stenosis. Total occlusion is no detectable patent lumen.   LEFT:   CCA: Patent without aneurysm, dissection or thrombus. Vascular calcifications at the carotid bifurcation without hemodynamically significant stenosis. ICA: Patent without aneurysm, dissection or thrombus. ECA: Patent without aneurysm, dissection or thrombus. VERT: Patent without aneurysm, dissection or thrombus.     RIGHT:   CCA: Patent without aneurysm, dissection or thrombus. Vascular calcifications at the carotid bifurcation without hemodynamically significant stenosis. ICA: Patent without aneurysm, dissection or thrombus. ECA: Patent without aneurysm, dissection or thrombus. VERT: Patent without aneurysm, dissection or thrombus.   AORTIC ARCH AND BRANCHES: Patent without aneurysm, dissection or thrombus.   Degenerative disc disease spondylosis of the cervical spine is seen.           CT angiogram of the neck and head shows no evidence for acute abnormality. No evidence for hemodynamically significant stenosis.   Signed by: Arnie Zamarripa 1/5/2024 10:39 AM Dictation workstation:   [id number]    ECG 12 lead 1/5/2024  Poor data quality, interpretation may be adversely affected Sinus rhythm with Premature atrial complexes Left axis deviation Inferior infarct , age undetermined Possible Anterior infarct (cited on or before 19-AUG-2020) Abnormal ECG When compared with ECG of 01-OCT-2023 10:40, ST no longer depressed in Inferior leads Nonspecific T wave abnormality now evident in Lateral leads Confirmed by David Perez ([id number]) on 1/5/2024 8:31:40 AM    XR  chest 1 view    Result Date: 1/5/2024  Interpreted By:  Arnie Zamarripa, STUDY: XR CHEST 1 VIEW; 1/5/2024 7:45 am   INDICATION: Signs/Symptoms:Stroke.   COMPARISON: 10/01/2023   ACCESSION NUMBER(S): [id number]   ORDERING CLINICIAN: SANDI TRENT   TECHNIQUE: 1 view of the chest was performed.   FINDINGS: The lungs are adequately inflated. No acute consolidation. No pleural effusion. No pneumothorax.  The cardiomediastinal silhouette is within normal limits.       No acute cardiopulmonary disease.   Signed by: Arnie Zamarripa 1/5/2024 8:18 AM Dictation workstation:   [id number]    CT brain attack head wo IV contrast    Result Date: 1/5/2024  STUDY: CT Head without IV Contrast; 01/05/2023 7:08 AM INDICATION: Stroke. COMPARISON: MR brain & CT head 10/01/2023. ACCESSION NUMBER(S): [id number] ORDERING CLINICIAN: SANDI TRENT TECHNIQUE: Noncontrast axial CT scan of head was performed.  Angled reformats in brain and bone windows were generated.  The images were reviewed in bone, brain, blood and soft tissue windows. Automated mA/kV exposure control was utilized and patient examination was performed in strict accordance with principles of ALARA. FINDINGS: CSF Spaces:  The ventricles, sulci and basal cisterns are within normal limits for age.  There is no extraaxial fluid collection.  Mild patchy areas of low-attenuation are seen in the subcortical and deep periventricular white matter suggesting areas of chronic microvascular ischemia.  Parenchyma:  The grey-white differentiation is intact.  There is no mass effect or midline shift.  There is no intracranial hemorrhage.  Calvarium:  The calvarium is unremarkable.  Paranasal sinuses and mastoids:  Moderate sized mucosal retention polyp in the left maxillary sinus.    No acute intracranial process. Signed by Jose L Alcantara MD    Assessment:  Eugenio Bliss is a 68 y.o. year-old male @ on admission for 0 days    Acute on chronic B/L LE weakness L>R with known  history of spinal stenosis  Left knee tenderness-doubt septic joint versus possible gout  Chronic A-fib-on Eliquis  Hypothyroidism  Essential hypertension  GERD  Vitamin B12 deficiency  Peripheral neuropathy  Diabetes mellitus type 2  Known lumbar radiculopathy  Known tardive dyskinesia  Deconditioning and impaired mobility    ID:  -Case discussed with orthopedic surgeon, suspicion for septic knee is low.  Await left knee x-ray and if unremarkable, will stop IV Rocephin.  Uric acid level within normal limits as patient has known history of gout. Continue allopurinol but started weaning down Decadron doses on 1/7.  -Patient also resumed on his Macrobid    PSYCHIATRY/NEUROLOGY:   -Stroke workup including MRI of the brain so far negative.  Await 2D echo and neurology consult.  Awaiting results of lipid panel and will defer to neurology if patient needs any more antiplatelet therapy since patient already on Eliquis    CARDIOLOGY:  -BP and heart rate are being watched closely after decreasing Lopressor from 100 mg twice daily to 75 mg twice daily due to occasional bradycardia.  Adjusted Cozaar from 75 mg to 100 mg daily on 1/7 with repeat labs reviewed from 1/7.  Continue IV fluids as well but at a lower rate.      ENDOCRINOLOGY:     Accu-Cheks are being monitored and patient resumed on Glucophage.  Continue ISS.     GI:   Protonix     HEMATOLOGY:  -CBC noted no need for transfusions unless pt develops severe bleeding, platelet counts less than 10,000 or Hgb becomes < 7.     MUSCULOSKELETAL:     1- generalized weakness:  PT&OT recommends postacute rehab and patient agreeable to this recommendation.    OTHER:    -The patient continues to be on the rest of their chronic home medications for depression, peripheral neuropathy, etc.  Will update family once available.   -Discharge planner is on the case following as well.      *CODE STATUS: DNR and no intubation     *DVT prophylaxis: Eliquis     *PUD PROPHYLAXIS:  Protonix      Time spent managing patient's care is greater than 41 minutes with approximately 50% or more spent in counseling and coordination of care.    Adrien ARCE    Portions of this Progress note were dictated using MModal and reviewed . While every effort was made to correct mistranscriptions, minor grammatical or typographical errors may be present from machine dictation

## 2024-01-07 NOTE — CARE PLAN
The patient's goals for the shift include      The clinical goals for the shift include monitor labs      Problem: Skin  Goal: Prevent/manage excess moisture  Outcome: Progressing     Problem: Skin  Goal: Prevent/minimize sheer/friction injuries  Outcome: Progressing

## 2024-01-07 NOTE — NURSING NOTE
Assumed care of pt. Pt is resting in bed no s/s distress pt states he is feeling better, still slight pain to left knee and asking when he will be going down for his xray of his knee.   There is no order that I can see - I will reach out to dr. Mcfarland   Pt is excited to work with physical therapy today. Pt is feeling a bit worried because his wife had to come to the ER as well for being SOB.   VSS however pt has had an episode of bradycardia last night and this morning into the 40's-50's- metoprolol held for HR 53 at morning med pass time will inform hospitalists on rounds this AM.   Lidocaine patch applied to left knee. IVF still running LR @50. Pt making good urine- continent. Neuro exam is negative.  Lungs are clear- pt on 2 liters BASELINE o2   No skin issues.   No further needs. Call light in reach and bed alarm is on.

## 2024-01-08 ENCOUNTER — APPOINTMENT (OUTPATIENT)
Dept: CARDIOLOGY | Facility: HOSPITAL | Age: 69
DRG: 554 | End: 2024-01-08
Payer: MEDICARE

## 2024-01-08 PROBLEM — R53.1 LEFT-SIDED WEAKNESS: Status: ACTIVE | Noted: 2024-01-08

## 2024-01-08 LAB
AORTIC VALVE MEAN GRADIENT: 6
AORTIC VALVE PEAK VELOCITY: 1.71
AV PEAK GRADIENT: 11.7
AVA (PEAK VEL): 1.47
AVA (VTI): 1.41
EJECTION FRACTION APICAL 4 CHAMBER: 58.8
EJECTION FRACTION: 60
GLUCOSE BLD MANUAL STRIP-MCNC: 106 MG/DL (ref 74–99)
GLUCOSE BLD MANUAL STRIP-MCNC: 115 MG/DL (ref 74–99)
GLUCOSE BLD MANUAL STRIP-MCNC: 136 MG/DL (ref 74–99)
GLUCOSE BLD MANUAL STRIP-MCNC: 162 MG/DL (ref 74–99)
LEFT ATRIUM VOLUME AREA LENGTH INDEX BSA: 31.4
LEFT VENTRICLE INTERNAL DIMENSION DIASTOLE: 5.69 (ref 3.5–6)
LEFT VENTRICULAR OUTFLOW TRACT DIAMETER: 2
MITRAL VALVE E/A RATIO: 1.54
RIGHT VENTRICLE FREE WALL PEAK S': 13.2
RIGHT VENTRICLE PEAK SYSTOLIC PRESSURE: 30.7
TRICUSPID ANNULAR PLANE SYSTOLIC EXCURSION: 2.8

## 2024-01-08 PROCEDURE — 93306 TTE W/DOPPLER COMPLETE: CPT

## 2024-01-08 PROCEDURE — 1200000002 HC GENERAL ROOM WITH TELEMETRY DAILY

## 2024-01-08 PROCEDURE — 2500000001 HC RX 250 WO HCPCS SELF ADMINISTERED DRUGS (ALT 637 FOR MEDICARE OP): Performed by: INTERNAL MEDICINE

## 2024-01-08 PROCEDURE — 2500000004 HC RX 250 GENERAL PHARMACY W/ HCPCS (ALT 636 FOR OP/ED): Performed by: INTERNAL MEDICINE

## 2024-01-08 PROCEDURE — 82947 ASSAY GLUCOSE BLOOD QUANT: CPT

## 2024-01-08 PROCEDURE — 93306 TTE W/DOPPLER COMPLETE: CPT | Performed by: INTERNAL MEDICINE

## 2024-01-08 PROCEDURE — 2500000005 HC RX 250 GENERAL PHARMACY W/O HCPCS: Performed by: INTERNAL MEDICINE

## 2024-01-08 PROCEDURE — 97165 OT EVAL LOW COMPLEX 30 MIN: CPT | Mod: GO

## 2024-01-08 PROCEDURE — 2500000002 HC RX 250 W HCPCS SELF ADMINISTERED DRUGS (ALT 637 FOR MEDICARE OP, ALT 636 FOR OP/ED): Performed by: INTERNAL MEDICINE

## 2024-01-08 RX ADMIN — ACETAMINOPHEN 650 MG: 325 TABLET ORAL at 20:25

## 2024-01-08 RX ADMIN — OXYCODONE HYDROCHLORIDE AND ACETAMINOPHEN 500 MG: 500 TABLET ORAL at 20:25

## 2024-01-08 RX ADMIN — LOSARTAN POTASSIUM 100 MG: 100 TABLET, FILM COATED ORAL at 08:22

## 2024-01-08 RX ADMIN — LEVOTHYROXINE SODIUM 50 MCG: 0.05 TABLET ORAL at 05:34

## 2024-01-08 RX ADMIN — HYDROXYCHLOROQUINE SULFATE 200 MG: 200 TABLET ORAL at 08:24

## 2024-01-08 RX ADMIN — LIDOCAINE 1 PATCH: 4 PATCH TOPICAL at 08:03

## 2024-01-08 RX ADMIN — BUSPIRONE HYDROCHLORIDE 10 MG: 10 TABLET ORAL at 08:23

## 2024-01-08 RX ADMIN — DEXAMETHASONE 4 MG: 4 TABLET ORAL at 21:00

## 2024-01-08 RX ADMIN — CYANOCOBALAMIN TAB 1000 MCG 1000 MCG: 1000 TAB at 08:23

## 2024-01-08 RX ADMIN — CEFTRIAXONE SODIUM 2 G: 2 INJECTION, SOLUTION INTRAVENOUS at 20:25

## 2024-01-08 RX ADMIN — PANTOPRAZOLE SODIUM 20 MG: 20 TABLET, DELAYED RELEASE ORAL at 08:30

## 2024-01-08 RX ADMIN — Medication 125 MCG: at 08:23

## 2024-01-08 RX ADMIN — ACETAMINOPHEN 650 MG: 325 TABLET ORAL at 18:08

## 2024-01-08 RX ADMIN — METOPROLOL TARTRATE 75 MG: 25 TABLET, FILM COATED ORAL at 20:25

## 2024-01-08 RX ADMIN — APIXABAN 5 MG: 5 TABLET, FILM COATED ORAL at 20:25

## 2024-01-08 RX ADMIN — NITROFURANTOIN MONOHYDRATE/MACROCRYSTALS 100 MG: 75; 25 CAPSULE ORAL at 05:34

## 2024-01-08 RX ADMIN — OXYCODONE HYDROCHLORIDE AND ACETAMINOPHEN 500 MG: 500 TABLET ORAL at 08:22

## 2024-01-08 RX ADMIN — ACETAMINOPHEN 650 MG: 325 TABLET ORAL at 07:50

## 2024-01-08 RX ADMIN — METFORMIN HYDROCHLORIDE 500 MG: 500 TABLET, FILM COATED ORAL at 07:54

## 2024-01-08 RX ADMIN — GABAPENTIN 100 MG: 100 CAPSULE ORAL at 15:59

## 2024-01-08 RX ADMIN — DEXAMETHASONE 4 MG: 4 TABLET ORAL at 08:23

## 2024-01-08 RX ADMIN — HYDROXYZINE HYDROCHLORIDE 25 MG: 25 TABLET, FILM COATED ORAL at 20:25

## 2024-01-08 RX ADMIN — TRAZODONE HYDROCHLORIDE 100 MG: 100 TABLET ORAL at 20:25

## 2024-01-08 RX ADMIN — METOPROLOL TARTRATE 75 MG: 25 TABLET, FILM COATED ORAL at 11:43

## 2024-01-08 RX ADMIN — LURASIDONE HYDROCHLORIDE 40 MG: 40 TABLET, FILM COATED ORAL at 08:29

## 2024-01-08 RX ADMIN — ACETAMINOPHEN 650 MG: 325 TABLET ORAL at 13:03

## 2024-01-08 RX ADMIN — BUSPIRONE HYDROCHLORIDE 10 MG: 10 TABLET ORAL at 20:25

## 2024-01-08 RX ADMIN — MULTIVITAMIN TABLET 1 TABLET: TABLET at 08:29

## 2024-01-08 RX ADMIN — APIXABAN 5 MG: 5 TABLET, FILM COATED ORAL at 08:30

## 2024-01-08 RX ADMIN — GABAPENTIN 100 MG: 100 CAPSULE ORAL at 08:23

## 2024-01-08 RX ADMIN — ALLOPURINOL 100 MG: 100 TABLET ORAL at 08:22

## 2024-01-08 RX ADMIN — GABAPENTIN 100 MG: 100 CAPSULE ORAL at 20:25

## 2024-01-08 ASSESSMENT — PAIN DESCRIPTION - ORIENTATION
ORIENTATION: LEFT
ORIENTATION: LEFT

## 2024-01-08 ASSESSMENT — COGNITIVE AND FUNCTIONAL STATUS - GENERAL
TOILETING: A LITTLE
MOVING TO AND FROM BED TO CHAIR: A LITTLE
HELP NEEDED FOR BATHING: A LITTLE
DRESSING REGULAR UPPER BODY CLOTHING: A LITTLE
EATING MEALS: A LITTLE
DRESSING REGULAR UPPER BODY CLOTHING: A LITTLE
EATING MEALS: A LITTLE
DRESSING REGULAR LOWER BODY CLOTHING: A LOT
MOVING FROM LYING ON BACK TO SITTING ON SIDE OF FLAT BED WITH BEDRAILS: A LITTLE
DAILY ACTIVITIY SCORE: 17
MOBILITY SCORE: 17
CLIMB 3 TO 5 STEPS WITH RAILING: A LOT
PERSONAL GROOMING: A LITTLE
TURNING FROM BACK TO SIDE WHILE IN FLAT BAD: A LITTLE
PERSONAL GROOMING: A LITTLE
STANDING UP FROM CHAIR USING ARMS: A LITTLE
DAILY ACTIVITIY SCORE: 17
DRESSING REGULAR LOWER BODY CLOTHING: A LOT
TOILETING: A LITTLE
HELP NEEDED FOR BATHING: A LITTLE
WALKING IN HOSPITAL ROOM: A LITTLE

## 2024-01-08 ASSESSMENT — PAIN - FUNCTIONAL ASSESSMENT: PAIN_FUNCTIONAL_ASSESSMENT: 0-10

## 2024-01-08 ASSESSMENT — PAIN SCALES - GENERAL
PAINLEVEL_OUTOF10: 6
PAINLEVEL_OUTOF10: 8
PAINLEVEL_OUTOF10: 5 - MODERATE PAIN
PAINLEVEL_OUTOF10: 4

## 2024-01-08 ASSESSMENT — PAIN SCALES - WONG BAKER: WONGBAKER_NUMERICALRESPONSE: HURTS LITTLE MORE

## 2024-01-08 ASSESSMENT — ACTIVITIES OF DAILY LIVING (ADL)
BATHING_ASSISTANCE: STAND BY
ADL_ASSISTANCE: INDEPENDENT

## 2024-01-08 NOTE — PROGRESS NOTES
01/08/24 1556   Discharge Planning   Living Arrangements Spouse/significant other   Support Systems Spouse/significant other   Assistance Needed Cane or walker prn   Type of Residence Private residence   Who is requesting discharge planning? Patient   Home or Post Acute Services Community services   Patient expects to be discharged to: Home with outpatient PTOT   Does the patient need discharge transport arranged? Yes   RoundTrip coordination needed? Yes   Has discharge transport been arranged? No

## 2024-01-08 NOTE — NURSING NOTE
Pt working with therapy stood @ bedside c/o pain in his left knee of 5/10 tylenol and lidoderm patch given to pt.

## 2024-01-08 NOTE — PROGRESS NOTES
Occupational Therapy    Evaluation    Patient Name: Eugenio Bliss  MRN: [id number]  Today's Date: 1/8/2024  Time Calculation  Start Time: 0801  Stop Time: 0814  Time Calculation (min): 13 min    Assessment  IP OT Assessment  OT Assessment: Patient is a 68 year old male admitted with bilateral leg weakness. Patient is presenting with a decline in strength, balance, activity tolerance, and function, resulting in an increased need for assistance with ADL/IADL tasks. Skilled therapy to address the above deficits and increase patient's safety and independence with daily tasks.  Prognosis: Good  Evaluation/Treatment Tolerance: Patient limited by pain  End of Session Communication: Bedside nurse  End of Session Patient Position: Bed, 3 rail up, Alarm on  Plan:  Treatment Interventions: ADL retraining, Functional transfer training, UE strengthening/ROM, Endurance training, Patient/family training, Neuromuscular reeducation, Compensatory technique education  OT Frequency: 2 times per week  OT Discharge Recommendations: Low intensity level of continued care  OT Recommended Transfer Status: Assist of 1  OT - OK to Discharge: Yes    Subjective   Current Problem:  1. Left-sided weakness  Transthoracic Echo (TTE) Complete    Transthoracic Echo (TTE) Complete      2. Numbness and tingling of left arm and leg        3. Hypertensive heart and chronic kidney disease with heart failure and stage 1 through stage 4 chronic kidney disease, or unspecified chronic kidney disease (CMS/HCC)  Transthoracic Echo (TTE) Complete    Transthoracic Echo (TTE) Complete        General:  General  Reason for Referral: decline in ADLs  Referred By: Dr. Francisco  Past Medical History Relevant to Rehab: Patient is a 68 year old male admitted with bilateral leg weakness. PMH: anxiety disorder, DM, a-fib, hypercholesteremia, hypothyroidism, RA, anxiety  Prior to Session Communication: Bedside nurse  Patient Position Received: Bed, 3 rail up  Preferred Learning  Style: verbal  General Comment: Patient cleared by nursing for therapy. Patient in bed upon arrival and agreeable to participate  Precautions:  Medical Precautions: Fall precautions, Oxygen therapy device and L/min (3L O2 via NC)  Pain:  Pain Assessment  Pain Assessment: 0-10  Pain Score: 8  Pain Type: Acute pain  Pain Location: Knee (ankle, foot)  Pain Orientation: Left    Objective   Cognition:  Overall Cognitive Status: Within Functional Limits  Orientation Level: Oriented X4    Home Living:  Type of Home: Mobile home (currently living in a motel since November due to a water leak in his home, plans to return to home in April)  Lives With: Spouse  Home Adaptive Equipment: Walker rolling or standard (2WW and rollator)  Home Layout: One level  Home Access: Stairs to enter with rails  Entrance Stairs-Rails: Both  Entrance Stairs-Number of Steps: 4  Home Living Comments: patient wears 3L O2 at baseline ATC   Prior Function:  Level of [place]: Independent with ADLs and functional transfers, Independent with homemaking with ambulation (assists spouse with ADLs)  Receives Help From: Family (Son)  ADL Assistance: Independent  Homemaking Assistance: Independent (son completes grocery shopping)  Ambulatory Assistance: Independent (occassional use of 2WW)  IADL History:  Homemaking Responsibilities: Yes  IADL Comments: patient is independent with IADLs at baseline. his son completes grocery shopping  ADL:  Eating Assistance: Stand by  Eating Deficit: Setup (items within reach)  Grooming Assistance: Stand by  Grooming Deficit: Setup, Supervision/safety, Standing with assistive device (per clinical judgement)  Bathing Assistance: Stand by  Bathing Deficit: Setup, Supervision/safety, Increased time to complete , Steadying (per clinical judgement)  UE Dressing Assistance: Stand by  UE Dressing Deficit: Setup (per clinical judgement)  LE Dressing Assistance: Minimal  LE Dressing Deficit: Supervision/safety, Steadying (per  clinical judgement)  Toileting Assistance with Device: Stand by  Toileting Deficit: Steadying, Supervison/safety, Increased time to complete (per clinical judgement)  Activity Tolerance:  Endurance: Decreased tolerance for upright activites  Bed Mobility/Transfers: Bed Mobility  Bed Mobility: Yes  Bed Mobility 1  Bed Mobility 1: Supine to sitting, Sitting to supine  Level of Assistance 1: Close supervision  Bed Mobility Comments 1: head of bed elevated  Bed Mobility 2  Bed Mobility  2: Sitting to supine  Level of Assistance 2: Close supervision  Bed Mobility Comments 2: head of bed elevated    Transfers  Transfer: Yes  Transfer 1  Transfer From 1: Bed to  Transfer to 1: Stand  Technique 1: Sit to stand  Transfer Device 1: Walker  Transfer Level of Assistance 1: Contact guard  Trials/Comments 1: patient then takes ~3 lateral side steps to the right with CGA and returns to a seated position    IADL's:   Homemaking Responsibilities: Yes  IADL Comments: patient is independent with IADLs at baseline. his son completes grocery shopping  Vision: Vision - Basic Assessment  Current Vision: Wears glasses all the time  Sensation:  Sensation Comment: reports tingling in left hand  Strength:  Strength Comments: B UE at least >/= 3/5 grossly. observed functionally  Perception:     Coordination:  Movements are Fluid and Coordinated: Yes   Hand Function:  Hand Function  Gross Grasp: Functional  Coordination: Functional  Extremities: RUE   RUE : Within Functional Limits (observed functionally) and LUE   LUE: Within Functional Limits (observed functionally)    Outcome Measures: [facility] Daily Activity  Putting on and taking off regular lower body clothing: A lot  Bathing (including washing, rinsing, drying): A little  Putting on and taking off regular upper body clothing: A little  Toileting, which includes using toilet, bedpan or urinal: A little  Taking care of personal grooming such as brushing teeth: A little  Eating Meals: A  little  Daily Activity - Total Score: 17    Education Documentation  Body Mechanics, taught by Bekah Shipman OT at 1/8/2024  9:00 AM.  Learner: Patient  Readiness: Acceptance  Method: Explanation, Demonstration  Response: Needs Reinforcement    Precautions, taught by Bekah Shipman OT at 1/8/2024  9:00 AM.  Learner: Patient  Readiness: Acceptance  Method: Explanation, Demonstration  Response: Needs Reinforcement    Education Comments  No comments found.      Goals:   Encounter Problems       Encounter Problems (Active)       OT Goals       ADLs (Progressing)       Start:  01/08/24    Expected End:  01/31/24       Patient will complete ADL tasks with Mod I, using AE as needed, in order to increase safety and independence with self-care tasks.         Functional Transfers (Progressing)       Start:  01/08/24    Expected End:  01/31/24       Patient will complete functional transfers with Mod I in order to increase safety and independence with daily tasks.         B UE Strengthening (Progressing)       Start:  01/08/24    Expected End:  01/31/24       Patient will increase B UE strength to 5/5 for functional transfers.         Functional Mobility (Progressing)       Start:  01/08/24    Expected End:  01/31/24       Patient will demonstrate the ability to complete item retrieval and functional mobility with Mod I in order to increase safety and independence with daily tasks.

## 2024-01-08 NOTE — PROGRESS NOTES
Janusz Bliss is a 68 y.o. male on day 0 of admission presenting with Leg weakness, bilateral.      Subjective      Patient denies acute concerns at this time  Objective     Last Recorded Vitals  /71 (BP Location: Right arm, Patient Position: Lying)   Pulse 54   Temp 36.8 °C (98.2 °F) (Oral)   Resp 18   Wt 92.1 kg (203 lb)   SpO2 100%   Intake/Output last 3 Shifts:    Intake/Output Summary (Last 24 hours) at 1/8/2024 1755  Last data filed at 1/8/2024 1600  Gross per 24 hour   Intake 50 ml   Output 2025 ml   Net -1975 ml       Admission Weight  Weight: 93.5 kg (206 lb 2.1 oz) (01/05/24 0659)    Daily Weight  01/05/24 : 92.1 kg (203 lb)    Image Results  Transthoracic Echo (TTE) Complete              [facility]  [address], [place] [address]              Phone [phone]    TRANSTHORACIC ECHOCARDIOGRAM REPORT       Patient Name:      JANUSZ BLISS        Reading Physician:    [id number] Pineda DO  Study Date:        1/8/2024              Ordering Provider:    [id number] SIMON HECK  MRN/PID:           [id number]              Fellow:  Accession#:        [id number]          Nurse:  Date of Birth/Age: 1955 / 68 years Sonographer:          Erna Solares  Gender:            M                     Additional Staff:     Krystina Nielsen RDCS  Height:            170.18 cm             Admit Date:  Weight:            92.08 kg              Admission Status:     Inpatient -                                                                 Routine  BSA:               2.03 m2               Department Location:  [facility]  Blood Pressure: 140 /82 mmHg    Study Type:    TRANSTHORACIC ECHO (TTE) COMPLETE  Diagnosis/ICD: Weakness-R53.1; Hypertensive heart and chronic kidney disease                  with heart failure and stage 1 through stage 4 chronic kidney                 disease, or unspecified chronic kidney disease-I13.0  Indication:    left sided weakness, hypertensive heart and chronic kidney                 disease with heart failure and stage 1 through stage 4 chronic                 kidney disease, or unspecified chronic kidney disease  CPT Codes:     Echo Complete w Full [id number]    Patient History:  Pertinent History: Arteriosclerosis of cor. art, AFib, hypercholesterolemia, DM,                     Pneumonia.    Study Detail: The following Echo studies were performed: 2D, M-Mode, Doppler and                color flow.       PHYSICIAN INTERPRETATION:  Left Ventricle: Left ventricular systolic function is normal, with an estimated ejection fraction of 60-65%. There are no regional wall motion abnormalities. The left ventricular cavity size is normal. Spectral Doppler shows a normal pattern of left ventricular diastolic filling.  Left Atrium: The left atrium is mildly dilated.  Right Ventricle: The right ventricle is normal in size. There is normal right ventricular global systolic function.  Right Atrium: The right atrium is normal in size.  Aortic Valve: The aortic valve is trileaflet. There is no evidence of aortic valve regurgitation. The peak instantaneous gradient of the aortic valve is 11.7 mmHg. The mean gradient of the aortic valve is 6.0 mmHg.  Mitral Valve: The mitral valve is normal in structure. There is mild mitral valve regurgitation.  Tricuspid Valve: The tricuspid valve is structurally normal. There is mild tricuspid regurgitation.  Pulmonic Valve: The pulmonic valve is not well visualized. The pulmonic valve regurgitation was not well visualized.  Pericardium: There is no pericardial effusion noted.  Aorta: The aortic root is normal. The ascending aorta is at the upper limits of normal.       CONCLUSIONS:   1. Left ventricular systolic function is normal with a 60-65% estimated  ejection fraction.    QUANTITATIVE DATA SUMMARY:  2D MEASUREMENTS:                            Normal Ranges:  IVSd:          1.01 cm    (0.6-1.1cm)  LVPWd:         1.05 cm    (0.6-1.1cm)  LVIDd:         5.69 cm    (3.9-5.9cm)  LVIDs:         3.80 cm  LV Mass Index: 115.3 g/m2  LV % FS        33.2 %    LA VOLUME:                                Normal Ranges:  LA Vol A4C:        57.0 ml    (22+/-6mL/m2)  LA Vol A2C:        70.1 ml  LA Vol BP:         63.9 ml  LA Vol Index A4C:  28.0ml/m2  LA Vol Index A2C:  34.4 ml/m2  LA Vol Index BP:   31.4 ml/m2  LA Area A4C:       20.4 cm2  LA Area A2C:       22.9 cm2  LA Major Axis A4C: 6.2 cm  LA Major Axis A2C: 6.4 cm  LA Volume Index:   29.9 ml/m2  LA Vol A4C:        53.1 ml  LA Vol A2C:        68.3 ml    M-MODE MEASUREMENTS:                   Normal Ranges:  Ao Root: 2.10 cm (2.0-3.7cm)  LAs:     6.10 cm (2.7-4.0cm)    AORTA MEASUREMENTS:                     Normal Ranges:  Asc Ao, d: 3.85 cm (2.1-3.4cm)    LV SYSTOLIC FUNCTION BY 2D PLANIMETRY (MOD):                      Normal Ranges:  EF-A4C View: 58.8 % (>=55%)  EF-A2C View: 64.0 %  EF-Biplane:  60.3 %    LV DIASTOLIC FUNCTION:                      Normal Ranges:  MV Peak E: 0.98 m/s (0.7-1.2 m/s)  MV Peak A: 0.63 m/s (0.42-0.7 m/s)  E/A Ratio: 1.54     (1.0-2.2)    MITRAL VALVE:                  Normal Ranges:  MV DT: 176 msec (150-240msec)    AORTIC VALVE:                                     Normal Ranges:  AoV Vmax:                1.71 m/s  (<=1.7m/s)  AoV Peak P.7 mmHg (<20mmHg)  AoV Mean P.0 mmHg  (1.7-11.5mmHg)  LVOT Max Lazarus:            0.80 m/s  (<=1.1m/s)  AoV VTI:                 36.50 cm  (18-25cm)  LVOT VTI:                16.40 cm  LVOT Diameter:           2.00 cm   (1.8-2.4cm)  AoV Area, VTI:           1.41 cm2  (2.5-5.5cm2)  AoV Area,Vmax:           1.47 cm2  (2.5-4.5cm2)  AoV Dimensionless Index: 0.45       RIGHT VENTRICLE:  TAPSE: 27.7 mm  RV s'  0.13 m/s    TRICUSPID  VALVE/RVSP:                              Normal Ranges:  Peak TR Velocity: 2.63 m/s  RV Syst Pressure: 30.7 mmHg (< 30mmHg)       [id number] Matt Pineda DO  Electronically signed on 1/8/2024 at 1:58:32 PM       ** Final **      Physical Exam  Physical Exam  Vitals reviewed.   Constitutional:       Appearance: Normal appearance.   HENT:      Head: Normocephalic.      Nose: Nose normal.      Mouth/Throat:      Mouth: Mucous membranes are moist.   Cardiovascular:      Rate and Rhythm: Normal rate and regular rhythm.      Pulses: Normal pulses.   Pulmonary:      Effort: Pulmonary effort is normal.      Breath sounds: Normal breath sounds.   Abdominal:      General: Abdomen is flat. Bowel sounds are normal.      Palpations: Abdomen is soft.      Tenderness: There is no abdominal tenderness.   Musculoskeletal:         General: Normal range of motion.      Cervical back: Normal range of motion.   Skin:     General: Skin is warm.      Capillary Refill: Capillary refill takes less than 2 seconds.   Neurological:      General: No focal deficit present.      Mental Status: He is alert and oriented to person, place, and time.   Psychiatric:         Mood and Affect: Mood normal.         Behavior: Behavior normal.       Relevant Results               Assessment/Plan                  Principal Problem:    Leg weakness, bilateral  Active Problems:    Left-sided weakness  Acute bilateral lower extremity weakness left greater than right,  Chronic A-fib on Eliquis  Lumbar radiculopathy  Tardive dyskinesia chronic  Deconditioning and impaired mobility with ambulatory dysfunction  Peripheral neuropathy  B12 deficiency  Left knee tenderness and pain gout suspected    -Patient ruled out for septic arthritis based on condition, antibiotics discontinued currently on steroids  -Neurology workup for MRI and stroke included negative workup at this time.  Patient is already on Eliquis continue  -Will continue blood pressure control  -DC IV  fluids  -Patient has home health PT recs he is wanting to go into placement however there are no options at this time for him  -Plan for DC in the morning with outpatient PT to come into the hospital daily for physical therapy  -DVT prophylaxis Marianne Francisco MD

## 2024-01-08 NOTE — NURSING NOTE
Assumed care of patient. Patient is A&Ox3 and is resting in ed. Call light in reach. Patient denies pain and is on room air.

## 2024-01-08 NOTE — CARE PLAN
The patient's goals for the shift include      The clinical goals for the shift include monitor labs    Problem: Fall/Injury  Goal: Not fall by end of shift  Outcome: Progressing  Goal: Be free from injury by end of the shift  Outcome: Progressing  Goal: Verbalize understanding of personal risk factors for fall in the hospital  Outcome: Progressing  Goal: Verbalize understanding of risk factor reduction measures to prevent injury from fall in the home  Outcome: Progressing  Goal: Use assistive devices by end of the shift  Outcome: Progressing  Goal: Pace activities to prevent fatigue by end of the shift  Outcome: Progressing     Problem: Skin  Goal: Decreased wound size/increased tissue granulation at next dressing change  Outcome: Progressing  Goal: Participates in plan/prevention/treatment measures  Outcome: Progressing  Goal: Prevent/manage excess moisture  Outcome: Progressing  Goal: Prevent/minimize sheer/friction injuries  Outcome: Progressing  Goal: Promote/optimize nutrition  Outcome: Progressing  Goal: Promote skin healing  Outcome: Progressing     Problem: Pain  Goal: My pain/discomfort is manageable  Outcome: Progressing     Problem: Safety  Goal: Patient will be injury free during hospitalization  Outcome: Progressing  Goal: I will remain free of falls  Outcome: Progressing     Problem: Daily Care  Goal: Daily care needs are met  Outcome: Progressing     Problem: Psychosocial Needs  Goal: Demonstrates ability to cope with hospitalization/illness  Outcome: Progressing  Goal: Collaborate with me, my family, and caregiver to identify my specific goals  Outcome: Progressing     Problem: Discharge Barriers  Goal: My discharge needs are met  Outcome: Progressing     Problem: Diabetes  Goal: Achieve decreasing blood glucose levels by end of shift  Outcome: Progressing  Goal: Increase stability of blood glucose readings by end of shift  Outcome: Progressing  Goal: Decrease in ketones present in urine by end of  shift  Outcome: Progressing  Goal: Maintain electrolyte levels within acceptable range throughout shift  Outcome: Progressing  Goal: Maintain glucose levels >70mg/dl to <250mg/dl throughout shift  Outcome: Progressing  Goal: No changes in neurological exam by end of shift  Outcome: Progressing  Goal: Learn about and adhere to nutrition recommendations by end of shift  Outcome: Progressing  Goal: Vital signs within normal range for age by end of shift  Outcome: Progressing  Goal: Increase self care and/or family involovement by end of shift  Outcome: Progressing  Goal: Receive DSME education by end of shift  Outcome: Progressing     Problem: Pain - Adult  Goal: Verbalizes/displays adequate comfort level or baseline comfort level  Outcome: Progressing     Problem: Safety - Adult  Goal: Free from fall injury  Outcome: Progressing     Problem: Discharge Planning  Goal: Discharge to home or other facility with appropriate resources  Outcome: Progressing     Problem: Chronic Conditions and Co-morbidities  Goal: Patient's chronic conditions and co-morbidity symptoms are monitored and maintained or improved  Outcome: Progressing     Problem: Pain  Goal: Takes deep breaths with improved pain control throughout the shift  Outcome: Progressing  Goal: Turns in bed with improved pain control throughout the shift  Outcome: Progressing  Goal: Walks with improved pain control throughout the shift  Outcome: Progressing  Goal: Performs ADL's with improved pain control throughout shift  Outcome: Progressing  Goal: Participates in PT with improved pain control throughout the shift  Outcome: Progressing  Goal: Free from opioid side effects throughout the shift  Outcome: Progressing  Goal: Free from acute confusion related to pain meds throughout the shift  Outcome: Progressing
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
## 2024-01-09 ENCOUNTER — HOME HEALTH ADMISSION (OUTPATIENT)
Dept: HOME HEALTH SERVICES | Facility: HOME HEALTH | Age: 69
End: 2024-01-09
Payer: MEDICARE

## 2024-01-09 VITALS
BODY MASS INDEX: 31.86 KG/M2 | DIASTOLIC BLOOD PRESSURE: 73 MMHG | OXYGEN SATURATION: 98 % | SYSTOLIC BLOOD PRESSURE: 128 MMHG | HEART RATE: 57 BPM | WEIGHT: 203 LBS | RESPIRATION RATE: 18 BRPM | TEMPERATURE: 97.3 F | HEIGHT: 67 IN

## 2024-01-09 PROBLEM — R53.1 LEFT-SIDED WEAKNESS: Status: RESOLVED | Noted: 2024-01-08 | Resolved: 2024-01-09

## 2024-01-09 LAB
GLUCOSE BLD MANUAL STRIP-MCNC: 149 MG/DL (ref 74–99)
GLUCOSE BLD MANUAL STRIP-MCNC: 80 MG/DL (ref 74–99)
GLUCOSE BLD MANUAL STRIP-MCNC: 96 MG/DL (ref 74–99)

## 2024-01-09 PROCEDURE — 2500000002 HC RX 250 W HCPCS SELF ADMINISTERED DRUGS (ALT 637 FOR MEDICARE OP, ALT 636 FOR OP/ED): Performed by: INTERNAL MEDICINE

## 2024-01-09 PROCEDURE — 2500000001 HC RX 250 WO HCPCS SELF ADMINISTERED DRUGS (ALT 637 FOR MEDICARE OP): Performed by: INTERNAL MEDICINE

## 2024-01-09 PROCEDURE — 2500000004 HC RX 250 GENERAL PHARMACY W/ HCPCS (ALT 636 FOR OP/ED): Performed by: INTERNAL MEDICINE

## 2024-01-09 PROCEDURE — 97110 THERAPEUTIC EXERCISES: CPT | Mod: GP,CQ

## 2024-01-09 PROCEDURE — 2500000005 HC RX 250 GENERAL PHARMACY W/O HCPCS: Performed by: INTERNAL MEDICINE

## 2024-01-09 PROCEDURE — 82947 ASSAY GLUCOSE BLOOD QUANT: CPT

## 2024-01-09 RX ORDER — METOPROLOL TARTRATE 75 MG/1
75 TABLET, FILM COATED ORAL 2 TIMES DAILY
Qty: 60 TABLET | Refills: 0 | Status: SHIPPED | OUTPATIENT
Start: 2024-01-09 | End: 2024-01-16 | Stop reason: SDUPTHER

## 2024-01-09 RX ORDER — ALLOPURINOL 100 MG/1
100 TABLET ORAL DAILY
Qty: 30 TABLET | Refills: 0 | Status: SHIPPED | OUTPATIENT
Start: 2024-01-09 | End: 2024-02-08

## 2024-01-09 RX ADMIN — Medication 125 MCG: at 08:24

## 2024-01-09 RX ADMIN — GABAPENTIN 100 MG: 100 CAPSULE ORAL at 08:24

## 2024-01-09 RX ADMIN — LOSARTAN POTASSIUM 100 MG: 100 TABLET, FILM COATED ORAL at 08:24

## 2024-01-09 RX ADMIN — OXYCODONE HYDROCHLORIDE 5 MG: 5 TABLET ORAL at 08:40

## 2024-01-09 RX ADMIN — LIDOCAINE 1 PATCH: 4 PATCH TOPICAL at 08:24

## 2024-01-09 RX ADMIN — HYDROXYCHLOROQUINE SULFATE 200 MG: 200 TABLET ORAL at 08:24

## 2024-01-09 RX ADMIN — METOPROLOL TARTRATE 75 MG: 25 TABLET, FILM COATED ORAL at 08:24

## 2024-01-09 RX ADMIN — OXYCODONE HYDROCHLORIDE AND ACETAMINOPHEN 500 MG: 500 TABLET ORAL at 08:24

## 2024-01-09 RX ADMIN — GABAPENTIN 100 MG: 100 CAPSULE ORAL at 14:18

## 2024-01-09 RX ADMIN — DEXAMETHASONE 4 MG: 4 TABLET ORAL at 08:24

## 2024-01-09 RX ADMIN — BUSPIRONE HYDROCHLORIDE 10 MG: 10 TABLET ORAL at 08:24

## 2024-01-09 RX ADMIN — METFORMIN HYDROCHLORIDE 500 MG: 500 TABLET, FILM COATED ORAL at 08:24

## 2024-01-09 RX ADMIN — PANTOPRAZOLE SODIUM 20 MG: 20 TABLET, DELAYED RELEASE ORAL at 08:24

## 2024-01-09 RX ADMIN — CYANOCOBALAMIN TAB 1000 MCG 1000 MCG: 1000 TAB at 08:24

## 2024-01-09 RX ADMIN — LEVOTHYROXINE SODIUM 50 MCG: 0.05 TABLET ORAL at 05:48

## 2024-01-09 RX ADMIN — NITROFURANTOIN MONOHYDRATE/MACROCRYSTALS 100 MG: 75; 25 CAPSULE ORAL at 05:48

## 2024-01-09 RX ADMIN — APIXABAN 5 MG: 5 TABLET, FILM COATED ORAL at 08:24

## 2024-01-09 RX ADMIN — ACETAMINOPHEN 650 MG: 325 TABLET ORAL at 16:09

## 2024-01-09 RX ADMIN — ALLOPURINOL 100 MG: 100 TABLET ORAL at 08:24

## 2024-01-09 RX ADMIN — LURASIDONE HYDROCHLORIDE 40 MG: 40 TABLET, FILM COATED ORAL at 08:24

## 2024-01-09 RX ADMIN — ACETAMINOPHEN 650 MG: 325 TABLET ORAL at 05:48

## 2024-01-09 RX ADMIN — MULTIVITAMIN TABLET 1 TABLET: TABLET at 08:24

## 2024-01-09 ASSESSMENT — COGNITIVE AND FUNCTIONAL STATUS - GENERAL
CLIMB 3 TO 5 STEPS WITH RAILING: A LITTLE
WALKING IN HOSPITAL ROOM: A LITTLE
CLIMB 3 TO 5 STEPS WITH RAILING: A LITTLE
TOILETING: A LITTLE
MOBILITY SCORE: 22
MOBILITY SCORE: 22
DAILY ACTIVITIY SCORE: 22
HELP NEEDED FOR BATHING: A LITTLE
WALKING IN HOSPITAL ROOM: A LITTLE

## 2024-01-09 ASSESSMENT — PAIN SCALES - GENERAL
PAINLEVEL_OUTOF10: 0 - NO PAIN
PAINLEVEL_OUTOF10: 2

## 2024-01-09 NOTE — NURSING NOTE
Patient medically cleared for discharge home with [facility]. This RN reviewed discharge paperwork with patient, son to transport home. Dr. Francisco also e-prescribed Allopurinol to patients pharmacy.

## 2024-01-09 NOTE — PROGRESS NOTES
Janusz Bliss is a 68 y.o. male on day 1 of admission presenting with Leg weakness, bilateral.      Subjective   Patient was evaluated, he was concerned about discharge did not want to be discharged home was requesting rehab placement       Objective     Last Recorded Vitals  BP (!) 144/92   Pulse 66   Temp 36.7 °C (98.1 °F) (Oral)   Resp 19   Wt 92.1 kg (203 lb)   SpO2 99%   Intake/Output last 3 Shifts:    Intake/Output Summary (Last 24 hours) at 1/9/2024 1413  Last data filed at 1/9/2024 0803  Gross per 24 hour   Intake 290 ml   Output 250 ml   Net 40 ml       Admission Weight  Weight: 93.5 kg (206 lb 2.1 oz) (01/05/24 0659)    Daily Weight  01/05/24 : 92.1 kg (203 lb)    Image Results  Transthoracic Echo (TTE) Complete              [address], [place] [id number]              Phone [phone]    TRANSTHORACIC ECHOCARDIOGRAM REPORT       Patient Name:      JANUSZ BLISS        Reading Physician:    [id number] Pineda DO  Study Date:        1/8/2024              Ordering Provider:    [id number] SIMON HECK  MRN/PID:           [id number]              Fellow:  Accession#:        [id number]          Nurse:  Date of Birth/Age: 1955 / 68 years Sonographer:          Erna Solares  Gender:            M                     Additional Staff:     Krystina Nielsen RDCS  Height:            170.18 cm             Admit Date:  Weight:            92.08 kg              Admission Status:     Inpatient -                                                                 Routine  BSA:               2.03 m2               Department Location:  [facility]  Blood Pressure: 140 /82 mmHg    Study Type:    TRANSTHORACIC ECHO (TTE) COMPLETE  Diagnosis/ICD: Weakness-R53.1; Hypertensive heart and chronic  kidney disease                 with heart failure and stage 1 through stage 4 chronic kidney                 disease, or unspecified chronic kidney disease-I13.0  Indication:    left sided weakness, hypertensive heart and chronic kidney                 disease with heart failure and stage 1 through stage 4 chronic                 kidney disease, or unspecified chronic kidney disease  CPT Codes:     Echo Complete w Full [id number]    Patient History:  Pertinent History: Arteriosclerosis of cor. art, AFib, hypercholesterolemia, DM,                     Pneumonia.    Study Detail: The following Echo studies were performed: 2D, M-Mode, Doppler and                color flow.       PHYSICIAN INTERPRETATION:  Left Ventricle: Left ventricular systolic function is normal, with an estimated ejection fraction of 60-65%. There are no regional wall motion abnormalities. The left ventricular cavity size is normal. Spectral Doppler shows a normal pattern of left ventricular diastolic filling.  Left Atrium: The left atrium is mildly dilated.  Right Ventricle: The right ventricle is normal in size. There is normal right ventricular global systolic function.  Right Atrium: The right atrium is normal in size.  Aortic Valve: The aortic valve is trileaflet. There is no evidence of aortic valve regurgitation. The peak instantaneous gradient of the aortic valve is 11.7 mmHg. The mean gradient of the aortic valve is 6.0 mmHg.  Mitral Valve: The mitral valve is normal in structure. There is mild mitral valve regurgitation.  Tricuspid Valve: The tricuspid valve is structurally normal. There is mild tricuspid regurgitation.  Pulmonic Valve: The pulmonic valve is not well visualized. The pulmonic valve regurgitation was not well visualized.  Pericardium: There is no pericardial effusion noted.  Aorta: The aortic root is normal. The ascending aorta is at the upper limits of normal.       CONCLUSIONS:   1. Left ventricular systolic function is  normal with a 60-65% estimated ejection fraction.    QUANTITATIVE DATA SUMMARY:  2D MEASUREMENTS:                            Normal Ranges:  IVSd:          1.01 cm    (0.6-1.1cm)  LVPWd:         1.05 cm    (0.6-1.1cm)  LVIDd:         5.69 cm    (3.9-5.9cm)  LVIDs:         3.80 cm  LV Mass Index: 115.3 g/m2  LV % FS        33.2 %    LA VOLUME:                                Normal Ranges:  LA Vol A4C:        57.0 ml    (22+/-6mL/m2)  LA Vol A2C:        70.1 ml  LA Vol BP:         63.9 ml  LA Vol Index A4C:  28.0ml/m2  LA Vol Index A2C:  34.4 ml/m2  LA Vol Index BP:   31.4 ml/m2  LA Area A4C:       20.4 cm2  LA Area A2C:       22.9 cm2  LA Major Axis A4C: 6.2 cm  LA Major Axis A2C: 6.4 cm  LA Volume Index:   29.9 ml/m2  LA Vol A4C:        53.1 ml  LA Vol A2C:        68.3 ml    M-MODE MEASUREMENTS:                   Normal Ranges:  Ao Root: 2.10 cm (2.0-3.7cm)  LAs:     6.10 cm (2.7-4.0cm)    AORTA MEASUREMENTS:                     Normal Ranges:  Asc Ao, d: 3.85 cm (2.1-3.4cm)    LV SYSTOLIC FUNCTION BY 2D PLANIMETRY (MOD):                      Normal Ranges:  EF-A4C View: 58.8 % (>=55%)  EF-A2C View: 64.0 %  EF-Biplane:  60.3 %    LV DIASTOLIC FUNCTION:                      Normal Ranges:  MV Peak E: 0.98 m/s (0.7-1.2 m/s)  MV Peak A: 0.63 m/s (0.42-0.7 m/s)  E/A Ratio: 1.54     (1.0-2.2)    MITRAL VALVE:                  Normal Ranges:  MV DT: 176 msec (150-240msec)    AORTIC VALVE:                                     Normal Ranges:  AoV Vmax:                1.71 m/s  (<=1.7m/s)  AoV Peak P.7 mmHg (<20mmHg)  AoV Mean P.0 mmHg  (1.7-11.5mmHg)  LVOT Max Lazarus:            0.80 m/s  (<=1.1m/s)  AoV VTI:                 36.50 cm  (18-25cm)  LVOT VTI:                16.40 cm  LVOT Diameter:           2.00 cm   (1.8-2.4cm)  AoV Area, VTI:           1.41 cm2  (2.5-5.5cm2)  AoV Area,Vmax:           1.47 cm2  (2.5-4.5cm2)  AoV Dimensionless Index: 0.45       RIGHT VENTRICLE:  TAPSE: 27.7 mm  RV  s'  0.13 m/s    TRICUSPID VALVE/RVSP:                              Normal Ranges:  Peak TR Velocity: 2.63 m/s  RV Syst Pressure: 30.7 mmHg (< 30mmHg)       [id number] Matt Pineda DO  Electronically signed on 1/8/2024 at 1:58:32 PM       ** Final **      Physical Exam  Physical Exam  Vitals reviewed.   Constitutional:       Appearance: Normal appearance.   HENT:      Head: Normocephalic.      Nose: Nose normal.      Mouth/Throat:      Mouth: Mucous membranes are moist.   Cardiovascular:      Rate and Rhythm: Normal rate and regular rhythm.      Pulses: Normal pulses.   Pulmonary:      Effort: Pulmonary effort is normal.      Breath sounds: Normal breath sounds.   Abdominal:      General: Abdomen is flat. Bowel sounds are normal.      Palpations: Abdomen is soft.      Tenderness: There is no abdominal tenderness.   Musculoskeletal:         General: Normal range of motion.      Cervical back: Normal range of motion.   Skin:     General: Skin is warm.      Capillary Refill: Capillary refill takes less than 2 seconds.   Neurological:      General: No focal deficit present.      Mental Status: He is alert and oriented to person, place, and time.   Psychiatric:         Mood and Affect: Mood normal.         Behavior: Behavior normal.       Relevant Results               Assessment/Plan                  Principal Problem:    Leg weakness, bilateral    At this time patient does not meet criteria for subacute rehab hence home with outpatient PT is the plan for tomorrow morning.              Maia Francisco MD

## 2024-01-09 NOTE — PROGRESS NOTES
Physical Therapy    Physical Therapy Treatment    Patient Name: Eugenio Bliss  MRN: [id number]  Today's Date: 1/9/2024  Time Calculation  Start Time: 1120  Stop Time: 1130  Time Calculation (min): 10 min       Assessment/Plan   PT Assessment  PT Assessment Results: Decreased strength, Decreased range of motion, Decreased endurance, Impaired balance, Decreased mobility, Decreased coordination, Decreased safety awareness  Rehab Prognosis: Good  Evaluation/Treatment Tolerance: Patient tolerated treatment well  End of Session Communication: Bedside nurse  End of Session Patient Position: Bed, 3 rail up, Alarm off, not on at start of session     PT Plan  Treatment/Interventions: Bed mobility, Transfer training, Gait training, Balance training, Strengthening, Endurance training, Range of motion, Therapeutic exercise, Home exercise program, Therapeutic activity  PT Plan: Skilled PT  PT Frequency: 3 times per week  PT Discharge Recommendations: Low intensity level of continued care  Equipment Recommended upon Discharge: Wheeled walker  PT Recommended Transfer Status: Contact guard  PT - OK to Discharge: Yes    General Visit Information:   PT  Visit  PT Received On: 01/09/24  Response to Previous Treatment: Patient with no complaints from previous session.  General  Reason for Referral: possible CVA, pain and numbness in LLE, possible gout and/or septic arthritis  Referred By: Dr. Francisco  Past Medical History Relevant to Rehab: Patient is a 68 year old male admitted with bilateral leg weakness. PMH: anxiety disorder, DM, a-fib, hypercholesteremia, hypothyroidism, RA, anxiety  Missed Visit: No  Family/Caregiver Present: No  Prior to Session Communication: Bedside nurse  Patient Position Received: Bed, 3 rail up  Preferred Learning Style: verbal    Subjective   Precautions:  Precautions  Medical Precautions: Fall precautions, Oxygen therapy device and L/min    Objective   Pain:  Pain Assessment  Pain Score: 0 - No  pain  Cognition:  Cognition  Orientation Level: Oriented X4    Activity Tolerance:  Activity Tolerance  Endurance: Tolerates less than 10 min exercise, no significant change in vital signs  Treatments:  Therapeutic Exercise  Therapeutic Exercise Performed: Yes  Therapeutic Exercise Activity 1: Bilat AP 5x  Therapeutic Exercise Activity 2: Bilat LAQ 5x  Therapeutic Exercise Activity 3: Bilat Glut Sets 5x  Therapeutic Exercise Activity 4: Bilat HIp ab/adduction 5x  Therapeutic Exercise Activity 5: Seated at EOB    Therapeutic Activity  Therapeutic Activity Performed: Yes  Therapeutic Activity 1: Bed mobility; transfers; education    Bed Mobility  Bed Mobility: Yes  Bed Mobility 1  Bed Mobility 1: Supine to sitting, Sitting to supine  Level of Assistance 1: Close supervision    Ambulation/Gait Training  Ambulation/Gait Training Performed: Yes  Ambulation/Gait Training 1  Surface 1: Level tile  Device 1: Rolling walker  Assistance 1: Contact guard  Quality of Gait 1: Decreased step length  Comments/Distance (ft) 1: 5x1 forward, retro, and lateral.  Transfers  Transfer: Yes  Transfer 1  Transfer From 1: Bed to  Transfer to 1: Sit, Stand  Technique 1: Sit to stand, Stand to sit  Transfer Device 1: Walker  Transfer Level of Assistance 1: Contact guard    Outcome Measures:  [facility]  Turning from your back to your side while in a flat bed without using bedrails: None  Moving from lying on your back to sitting on the side of a flat bed without using bedrails: None  Moving to and from bed to chair (including a wheelchair): None  Standing up from a chair using your arms (e.g. wheelchair or bedside chair): None  To walk in hospital room: A little  Climbing 3-5 steps with railing: A little  Basic Mobility - Total Score: 22    OP EDUCATION:  Outpatient Education  Individual(s) Educated: Patient  Education Provided: Anatomy, Body Mechanics, Fall Risk, Home Exercise Program, Home Safety  Patient Response to Education:  Patient/Caregiver Verbalized Understanding of Information, Patient/Caregiver Performed Return Demonstration of Exercises/Activities, Patient/Caregiver Asked Appropriate Questions    Encounter Problems       Encounter Problems (Active)       PT Problem       pt to improve transfers to mod I with FWW (Progressing)       Start:  01/06/24    Expected End:  01/27/24            Pt to ambulate >300' with FWW mod I (Progressing)       Start:  01/06/24    Expected End:  01/27/24            Pt to ascend/descend 4 stairs with HR mod I  (Progressing)       Start:  01/06/24    Expected End:  01/27/24                  Encounter Problems (Resolved)       Pain - Adult

## 2024-01-09 NOTE — CARE PLAN
The patient's goals for the shift include  discharge    The clinical goals for the shift include discharge

## 2024-01-09 NOTE — NURSING NOTE
Assumed care of patient. Patient is A&Ox3 and is resting in bed. Call light in reach. Patient is on 2L nc. Patient is normal sinus on monitor @ 67bpm.

## 2024-01-09 NOTE — PROGRESS NOTES
"Eugenio Bliss is a 68 y.o. male on day 1 of admission presenting with Leg weakness, bilateral.    Subjective   TCSW arranged HHC for pt at the [facility].   TCSW informed Dr. Francisco of pt's need for internal HC orders.     Pt will [facility] home today [facility].        Objective     Physical Exam    Last Recorded Vitals  Blood pressure (!) 144/92, pulse 66, temperature 36.7 °C (98.1 °F), temperature source Oral, resp. rate 19, height 1.702 m (5' 7.01\"), weight 92.1 kg (203 lb), SpO2 99 %.  Intake/Output last 3 Shifts:  I/O last 3 completed shifts:  In: 100 (1.1 mL/kg) [IV Piggyback:100]  Out: 2025 (22 mL/kg) [Urine:2025 (0.6 mL/kg/hr)]  Weight: 92.1 kg     Relevant Results                             Assessment/Plan   Principal Problem:    Leg weakness, bilateral               Cydnee A KELLIE DonW      "

## 2024-01-09 NOTE — DISCHARGE SUMMARY
Discharge Diagnosis  Leg weakness, bilateral- POA-improved  Chronic A-fib on Eliquis  Lumbar radiculopathy chronic  Chronic tardive dyskinesia  Deconditioning and poor mobility ambulatory dysfunction recommending outpatient PT at this time after evaluation with PT  Peripheral neuropathy  B12 deficiency  Left knee tenderness and pain gout attack treated    Issues Requiring Follow-Up  -Long-term gout management  -Outpatient physical therapy compliance    Discharge Meds     Your medication list        CHANGE how you take these medications        Instructions Last Dose Given Next Dose Due   metoprolol tartrate 75 mg tablet  Commonly known as: Lopressor  What changed:   medication strength  how much to take      Take 1 tablet (75 mg) by mouth 2 times a day.              CONTINUE taking these medications        Instructions Last Dose Given Next Dose Due   acetaminophen 325 mg tablet  Commonly known as: Tylenol      Take 1 tablet (325 mg) by mouth 4 times a day.       allopurinol 100 mg tablet  Commonly known as: Zyloprim      Take 1 tablet (100 mg) by mouth once daily.       ascorbic acid 500 mg tablet  Commonly known as: Vitamin C           busPIRone 7.5 mg tablet  Commonly known as: Buspar           cholecalciferol 25 MCG (1000 UT) capsule  Commonly known as: Vitamin D-3      Take 1 capsule (25 mcg) by mouth once daily.       cyanocobalamin 1,000 mcg tablet  Commonly known as: Vitamin B-12      Take 1 tablet (1,000 mcg) by mouth once daily.       Eliquis 5 mg tablet  Generic drug: apixaban      TAKE 1 TABLET BY MOUTH TWICE A DAY       gabapentin 100 mg capsule  Commonly known as: Neurontin      Take 1 capsule (100 mg) by mouth 3 times a day.       hydroxychloroquine 200 mg tablet  Commonly known as: Plaquenil      TAKE 1 TABLET BY MOUTH EVERY DAY       levothyroxine 50 mcg tablet  Commonly known as: Synthroid, Levoxyl      TAKE 1 TABLET BY MOUTH EVERY DAY       losartan 50 mg tablet  Commonly known as: Cozaar      TAKE  1 AND 1/2 TABLETS DAILY BY MOUTH       lurasidone 40 mg tablet  Commonly known as: Latuda      Take 1 tablet (40 mg) by mouth once daily.       metFORMIN 500 mg tablet  Commonly known as: Glucophage           multivitamin tablet           nebulizer accessories kit      1 kit every 4 hours if needed (wheezing).       pantoprazole 20 mg EC tablet  Commonly known as: ProtoNix      TAKE 1 TABLET BY MOUTH EVERY DAY       traZODone 100 mg tablet  Commonly known as: Desyrel           vilazodone 40 mg tablet  Commonly known as: Viibryd                  STOP taking these medications      nitrofurantoin (macrocrystal-monohydrate) 100 mg capsule  Commonly known as: Macrobid               ASK your doctor about these medications        Instructions Last Dose Given Next Dose Due   diaper,brief,adult,disposable misc  Ask about: Should I take this medication?      1 Application 4 times a day as needed (wetness).       hydrOXYzine HCL 25 mg tablet  Commonly known as: Atarax      TAKE 1 TABLET BY MOUTH EVERY DAY AT BEDTIME AS NEEDED                 Where to Get Your Medications        These medications were sent to [facility]/pharmacy #[id number] - [address] AT [place] [address]      Phone: [phone]   metoprolol tartrate 75 mg tablet         Test Results Pending At Discharge  Pending Labs       Order Current Status    Blood Culture Preliminary result          Transthoracic Echo (TTE) Complete    Result Date: 1/8/2024            [facility] [address] [place] [address], [place] [address]             Phone [phone] TRANSTHORACIC ECHOCARDIOGRAM REPORT  Patient Name:      JANUSZ Ballard Physician:    [id number] Matt Pineda DO Study Date:        1/8/2024              Ordering Provider:    [id number] SIMON HECK MRN/PID:            [id number]              Fellow: Accession#:        [id number]          Nurse: Date of Birth/Age: 1955 / 68 years Sonographer:          Erna Solares Gender:            M                     Additional Staff:     Krystina Nielsen RDCS Height:            170.18 cm             Admit Date: Weight:            92.08 kg              Admission Status:     Inpatient -                                                                Routine BSA:               2.03 m2               Department Location:  [facility] Blood Pressure: 140 /82 mmHg Study Type:    TRANSTHORACIC ECHO (TTE) COMPLETE Diagnosis/ICD: Weakness-R53.1; Hypertensive heart and chronic kidney disease                with heart failure and stage 1 through stage 4 chronic kidney                disease, or unspecified chronic kidney disease-I13.0 Indication:    left sided weakness, hypertensive heart and chronic kidney                disease with heart failure and stage 1 through stage 4 chronic                kidney disease, or unspecified chronic kidney disease CPT Codes:     Echo Complete w Full [id number] Patient History: Pertinent History: Arteriosclerosis of cor. art, AFib, hypercholesterolemia, DM,                    Pneumonia. Study Detail: The following Echo studies were performed: 2D, M-Mode, Doppler and               color flow.  PHYSICIAN INTERPRETATION: Left Ventricle: Left ventricular systolic function is normal, with an estimated ejection fraction of 60-65%. There are no regional wall motion abnormalities. The left ventricular cavity size is normal. Spectral Doppler shows a normal pattern of left ventricular diastolic filling. Left Atrium: The left atrium is mildly dilated. Right Ventricle: The right ventricle is normal in size. There is normal right ventricular global systolic function. Right Atrium: The right atrium is normal in size. Aortic Valve: The aortic valve is trileaflet.  There is no evidence of aortic valve regurgitation. The peak instantaneous gradient of the aortic valve is 11.7 mmHg. The mean gradient of the aortic valve is 6.0 mmHg. Mitral Valve: The mitral valve is normal in structure. There is mild mitral valve regurgitation. Tricuspid Valve: The tricuspid valve is structurally normal. There is mild tricuspid regurgitation. Pulmonic Valve: The pulmonic valve is not well visualized. The pulmonic valve regurgitation was not well visualized. Pericardium: There is no pericardial effusion noted. Aorta: The aortic root is normal. The ascending aorta is at the upper limits of normal.  CONCLUSIONS:  1. Left ventricular systolic function is normal with a 60-65% estimated ejection fraction. QUANTITATIVE DATA SUMMARY: 2D MEASUREMENTS:                           Normal Ranges: IVSd:          1.01 cm    (0.6-1.1cm) LVPWd:         1.05 cm    (0.6-1.1cm) LVIDd:         5.69 cm    (3.9-5.9cm) LVIDs:         3.80 cm LV Mass Index: 115.3 g/m2 LV % FS        33.2 % LA VOLUME:                               Normal Ranges: LA Vol A4C:        57.0 ml    (22+/-6mL/m2) LA Vol A2C:        70.1 ml LA Vol BP:         63.9 ml LA Vol Index A4C:  28.0ml/m2 LA Vol Index A2C:  34.4 ml/m2 LA Vol Index BP:   31.4 ml/m2 LA Area A4C:       20.4 cm2 LA Area A2C:       22.9 cm2 LA Major Axis A4C: 6.2 cm LA Major Axis A2C: 6.4 cm LA Volume Index:   29.9 ml/m2 LA Vol A4C:        53.1 ml LA Vol A2C:        68.3 ml M-MODE MEASUREMENTS:                  Normal Ranges: Ao Root: 2.10 cm (2.0-3.7cm) LAs:     6.10 cm (2.7-4.0cm) AORTA MEASUREMENTS:                    Normal Ranges: Asc Ao, d: 3.85 cm (2.1-3.4cm) LV SYSTOLIC FUNCTION BY 2D PLANIMETRY (MOD):                     Normal Ranges: EF-A4C View: 58.8 % (>=55%) EF-A2C View: 64.0 % EF-Biplane:  60.3 % LV DIASTOLIC FUNCTION:                     Normal Ranges: MV Peak E: 0.98 m/s (0.7-1.2 m/s) MV Peak A: 0.63 m/s (0.42-0.7 m/s) E/A Ratio: 1.54     (1.0-2.2) MITRAL VALVE:                  Normal Ranges: MV DT: 176 msec (150-240msec) AORTIC VALVE:                                    Normal Ranges: AoV Vmax:                1.71 m/s  (<=1.7m/s) AoV Peak P.7 mmHg (<20mmHg) AoV Mean P.0 mmHg  (1.7-11.5mmHg) LVOT Max Lazarus:            0.80 m/s  (<=1.1m/s) AoV VTI:                 36.50 cm  (18-25cm) LVOT VTI:                16.40 cm LVOT Diameter:           2.00 cm   (1.8-2.4cm) AoV Area, VTI:           1.41 cm2  (2.5-5.5cm2) AoV Area,Vmax:           1.47 cm2  (2.5-4.5cm2) AoV Dimensionless Index: 0.45  RIGHT VENTRICLE: TAPSE: 27.7 mm RV s'  0.13 m/s TRICUSPID VALVE/RVSP:                             Normal Ranges: Peak TR Velocity: 2.63 m/s RV Syst Pressure: 30.7 mmHg (< 30mmHg)  [id number] Matt Pineda DO Electronically signed on 2024 at 1:58:32 PM  ** Final **     XR knee 3 views bilateral    Result Date: 2024  Interpreted By:  Arelis Lara, STUDY: XR KNEE 3 VIEWS BILATERAL  2024 3:50 pm   INDICATION: Signs/Symptoms:left knee pain   COMPARISON: 2023   ACCESSION NUMBER(S): [id number]   ORDERING CLINICIAN: DANIAL BISWAS   TECHNIQUE: Four views of the Left knee were performed.   FINDINGS: Bones: The bones appear intact. Joints: The joints are maintained. Chondrocalcinosis. Soft Tissue: There is no knee joint effusion.       No acute bony abnormality. Chondrocalcinosis is seen.   MACRO: None.   Signed by: Arelis Lara 2024 4:16 PM Dictation workstation:   [id number]    MR brain wo IV contrast    Result Date: 2024  Interpreted By:  Arelis Lara, STUDY: MR BRAIN WO IV CONTRAST; 2024 9:34 pm   INDICATION: Signs/Symptoms:TIA Hx CVA;   COMPARISON: CT head dated 2024   ACCESSION NUMBER(S): [id number]   ORDERING CLINICIAN: SIMON HECK   TECHNIQUE: Multiple, multiplanar sequences of the brain were acquired.   FINDINGS: No focal mass effect or midline shift is identified. The ventricles and sulci are symmetric and  appropriate for the patient's age.   The gray-white matter differentiation is preserved. No restricted diffusion is seen.   No acute intracranial hemorrhage is seen. No intra-axial or extra-axial fluid collection is seen.   The visualized paranasal sinuses and mastoid air cells are clear.       No acute intracranial findings.   MACRO: None.   Signed by: Arelis Lara 1/6/2024 8:00 AM Dictation workstation:   [id number]    CT angio head and neck w and wo IV contrast    Result Date: 1/5/2024  Interpreted By:  Arnie Zamarripa, STUDY: CT ANGIO HEAD AND NECK W AND WO IV CONTRAST; 1/5/2024 8:43 am   INDICATION: Signs/Symptoms:Left-sided numbness and weakness;   COMPARISON: CT head same day /   ACCESSION NUMBER(S): [id number]   ORDERING CLINICIAN: SANDI TRENT   TECHNIQUE: CT arteriogram of the head and neck with 100 ml of Omnipaque 350 was injected intravenously. 3D MIP images were created, processed, and interpreted on an independent workstation. All CT examinations are performed with 1 or more of the following dose reduction techniques: Automated exposure control, adjustment of mA and/or kv according to patient's size, or use of iterative reconstruction techniques.   FINDINGS: CTA HEAD:   Basilar: Patent without aneurysm, dissection or thrombus. PCOM: Right posterior communicating artery is present with fetal origin of the right posterior cerebral artery. A left posterior communicating artery is not present. ACOM: Patent without aneurysm, dissection or thrombus.   LEFT:   MCA: Patent without aneurysm, dissection or thrombus. PAXTON: Patent without aneurysm, dissection or thrombus. PCA: Patent without aneurysm, dissection or thrombus. ICA: Patent without aneurysm, dissection or thrombus. VERT: Patent without aneurysm, dissection or thrombus.   RIGHT:   MCA: Patent without aneurysm, dissection or thrombus. PAXTON: Patent without aneurysm, dissection or thrombus. PCA: Patent without aneurysm, dissection or thrombus.  ICA: Patent without aneurysm, dissection or thrombus. VERT: Patent without aneurysm, dissection or thrombus.     CTA NECK:     The estimate of the degree of stenosis included in this report is based on the NASCET CRITERIA for calculating stenosis by using the internal carotid artery distal to the stenosis as the reference point. Normal is no stenosis. Mild is less than 50% stenosis. Moderate is 50-69% stenosis. Severe is 70-99% stenosis. Total occlusion is no detectable patent lumen.   LEFT:   CCA: Patent without aneurysm, dissection or thrombus. Vascular calcifications at the carotid bifurcation without hemodynamically significant stenosis. ICA: Patent without aneurysm, dissection or thrombus. ECA: Patent without aneurysm, dissection or thrombus. VERT: Patent without aneurysm, dissection or thrombus.     RIGHT:   CCA: Patent without aneurysm, dissection or thrombus. Vascular calcifications at the carotid bifurcation without hemodynamically significant stenosis. ICA: Patent without aneurysm, dissection or thrombus. ECA: Patent without aneurysm, dissection or thrombus. VERT: Patent without aneurysm, dissection or thrombus.   AORTIC ARCH AND BRANCHES: Patent without aneurysm, dissection or thrombus.   Degenerative disc disease spondylosis of the cervical spine is seen.           CT angiogram of the neck and head shows no evidence for acute abnormality. No evidence for hemodynamically significant stenosis.   Signed by: Arnie Zamarripa 1/5/2024 10:39 AM Dictation workstation:   [id number]    ECG 12 lead    Result Date: 1/5/2024  Poor data quality, interpretation may be adversely affected Sinus rhythm with Premature atrial complexes Left axis deviation Inferior infarct , age undetermined Possible Anterior infarct (cited on or before 19-AUG-2020) Abnormal ECG When compared with ECG of 01-OCT-2023 10:40, ST no longer depressed in Inferior leads Nonspecific T wave abnormality now evident in Lateral leads Confirmed by  David Perez ([id number]) on 1/5/2024 8:31:40 AM    XR chest 1 view    Result Date: 1/5/2024  Interpreted By:  Arnie Zamarripa, STUDY: XR CHEST 1 VIEW; 1/5/2024 7:45 am   INDICATION: Signs/Symptoms:Stroke.   COMPARISON: 10/01/2023   ACCESSION NUMBER(S): [id number]   ORDERING CLINICIAN: SANDI TRENT   TECHNIQUE: 1 view of the chest was performed.   FINDINGS: The lungs are adequately inflated. No acute consolidation. No pleural effusion. No pneumothorax.  The cardiomediastinal silhouette is within normal limits.       No acute cardiopulmonary disease.   Signed by: Arnie Zamarripa 1/5/2024 8:18 AM Dictation workstation:   [id number]    CT brain attack head wo IV contrast    Result Date: 1/5/2024  STUDY: CT Head without IV Contrast; 01/05/2023 7:08 AM INDICATION: Stroke. COMPARISON: MR brain & CT head 10/01/2023. ACCESSION NUMBER(S): [id number] ORDERING CLINICIAN: SANDI TRENT TECHNIQUE: Noncontrast axial CT scan of head was performed.  Angled reformats in brain and bone windows were generated.  The images were reviewed in bone, brain, blood and soft tissue windows. Automated mA/kV exposure control was utilized and patient examination was performed in strict accordance with principles of ALARA. FINDINGS: CSF Spaces:  The ventricles, sulci and basal cisterns are within normal limits for age.  There is no extraaxial fluid collection.  Mild patchy areas of low-attenuation are seen in the subcortical and deep periventricular white matter suggesting areas of chronic microvascular ischemia.  Parenchyma:  The grey-white differentiation is intact.  There is no mass effect or midline shift.  There is no intracranial hemorrhage.  Calvarium:  The calvarium is unremarkable.  Paranasal sinuses and mastoids:  Moderate sized mucosal retention polyp in the left maxillary sinus.    No acute intracranial process. Signed by Jose L Alcantara MD       Hospital Course   68-year-old male admitted on 1/5/2024 due to leg weakness left  greater than right.  Initial evaluation was concerning for strokelike signs.  Patient was seen by neurologist.  Brain imaging as above was unremarkable for stroke signs.  CVA was effectively ruled out.  PT OT consults were obtained patient had low intensity level of care recommendations thus outpatient therapy was recommended.  Patient did have left knee pain radiographs were negative for negative for fracture or dislocation.  Suspected to have a gouty attack which was treated with steroids and allopurinol.  At this time patient's pain is improved. During this admission patient was empirically treated for UTI urine culture is without growth till date thus antibiotics were discontinued.       [facility].  Does not qualify for inpatient rehab though that is what he desires.  The weakness is suspected to be musculoskeletal in nature with debility.  Outpatient  physical therapy set up for him to return to the hospital for the sessions.  He reports to me that he is not interested in doing this option.      Pertinent Physical Exam At Time of Discharge  Physical Exam    Vitals reviewed.   Constitutional:       Appearance: Normal appearance.   HENT:      Head: Normocephalic.      Nose: Nose normal.      Mouth/Throat:      Mouth: Mucous membranes are moist.   Cardiovascular:      Rate and Rhythm: Normal rate and regular rhythm.      Pulses: Normal pulses.   Pulmonary:      Effort: Pulmonary effort is normal.      Breath sounds: Normal breath sounds.   Abdominal:      General: Abdomen is flat. Bowel sounds are normal.      Palpations: Abdomen is soft.      Tenderness: There is no abdominal tenderness.   Musculoskeletal:         General: Normal range of motion.      Cervical back: Normal range of motion.   Skin:     General: Skin is warm.      Capillary Refill: Capillary refill takes less than 2 seconds.   Neurological:      General: No focal deficit present.      Mental  Status: He is alert and oriented to person, place, and time.   Psychiatric:         Mood and Affect: Mood normal.         Behavior: Behavior normal.       Outpatient Follow-Up  Future Appointments   Date Time Provider Department Center   1/18/2024  8:15 AM TRI ENDO 1 [email or web address] [facility]   2/2/2024  9:00 AM Titi Carson MD [id number] [facility]   2/13/2024 10:40 AM MADISYN Anderson-CNP [id number] [facility]         Maia Francisco MD

## 2024-01-10 ENCOUNTER — PATIENT OUTREACH (OUTPATIENT)
Dept: PRIMARY CARE | Facility: CLINIC | Age: 69
End: 2024-01-10
Payer: MEDICARE

## 2024-01-10 ENCOUNTER — DOCUMENTATION (OUTPATIENT)
Dept: HOME HEALTH SERVICES | Facility: HOME HEALTH | Age: 69
End: 2024-01-10
Payer: MEDICARE

## 2024-01-10 LAB — BACTERIA BLD CULT: NORMAL

## 2024-01-10 NOTE — PROGRESS NOTES
Discharge Facility:   [facility]   Discharge Diagnosis:  leg weakness (gout)  Admission Date:  1/5/24  Discharge Date:   1/9/24    PCP Appointment Date:  2/13/24  NOT in 14 day window    Specialist Appointment Date:   GI 1/18/24  Pain Med 2/2/24    Hospital Encounter and Summary: Linked   See discharge assessment below for further details     Engagement  Call Start Time: 0942 (1/10/2024  9:42 AM)    Medications  Medications reviewed with patient/caregiver?: Yes (1/10/2024  9:42 AM)  Is the patient having any side effects they believe may be caused by any medication additions or changes?: No (1/10/2024  9:42 AM)  Does the patient have all medications ordered at discharge?: Yes (1/10/2024  9:42 AM)  Is the patient taking all medications as directed (includes completed medication regime)?: Yes (1/10/2024  9:42 AM)  Medication Comments: reviewed new, changed, and discontinued meds.  metoprolol is changed.  macrobid is discontinued. (1/10/2024  9:42 AM)    Appointments  Does the patient have a primary care provider?: Yes (1/10/2024  9:42 AM)  Care Management Interventions: -- (none available.  message to office.) (1/10/2024  9:42 AM)  Has the patient kept scheduled appointments due by today?: Yes (1/10/2024  9:42 AM)    Self Management  What is the home health agency?: [facility].  contact info given.  patient to call if he does not hear from them by this afternoon. (1/10/2024  9:42 AM)    Patient Teaching  Does the patient have access to their discharge instructions?: Yes (1/10/2024  9:42 AM)  What is the patient's perception of their health status since discharge?: Same (1/10/2024  9:42 AM)  Is the patient/caregiver able to teach back the hierarchy of who to call/visit for symptoms/problems? PCP, Specialist, Home Health nurse, Urgent Care, ED, 911: Yes (1/10/2024  9:42 AM)  Patient/Caregiver Education Comments: spoke with Eugenio.  continues with pain in his legs.  has all meds.  call back number given for questions or  No hydro follow up as planned no imaging prior  concerns. (1/10/2024  9:42 AM)

## 2024-01-10 NOTE — HH CARE COORDINATION
Home Care received a Referral for Nursing and Physical Therapy. We have processed the referral for a Start of Care on 1/11.     If you have any questions or concerns, please feel free to contact us at [phone]. Follow the prompts, enter your five digit zip code, and you will be directed to your care team on [facility] 1.

## 2024-01-11 ENCOUNTER — HOME CARE VISIT (OUTPATIENT)
Dept: HOME HEALTH SERVICES | Facility: HOME HEALTH | Age: 69
End: 2024-01-11
Payer: MEDICARE

## 2024-01-11 VITALS
DIASTOLIC BLOOD PRESSURE: 78 MMHG | SYSTOLIC BLOOD PRESSURE: 130 MMHG | OXYGEN SATURATION: 99 % | HEIGHT: 67 IN | RESPIRATION RATE: 18 BRPM | BODY MASS INDEX: 31.86 KG/M2 | WEIGHT: 203 LBS | HEART RATE: 66 BPM | TEMPERATURE: 98.2 F

## 2024-01-11 PROCEDURE — 169592 NO-PAY CLAIM PROCEDURE

## 2024-01-11 PROCEDURE — 1090000002 HH PPS REVENUE DEBIT

## 2024-01-11 PROCEDURE — 1090000001 HH PPS REVENUE CREDIT

## 2024-01-11 PROCEDURE — 0023 HH SOC

## 2024-01-11 PROCEDURE — G0299 HHS/HOSPICE OF RN EA 15 MIN: HCPCS | Mod: HHH

## 2024-01-12 ENCOUNTER — HOME CARE VISIT (OUTPATIENT)
Dept: HOME HEALTH SERVICES | Facility: HOME HEALTH | Age: 69
End: 2024-01-12
Payer: MEDICARE

## 2024-01-12 PROCEDURE — 1090000002 HH PPS REVENUE DEBIT

## 2024-01-12 PROCEDURE — G0151 HHCP-SERV OF PT,EA 15 MIN: HCPCS | Mod: HHH

## 2024-01-12 PROCEDURE — 1090000001 HH PPS REVENUE CREDIT

## 2024-01-12 ASSESSMENT — ENCOUNTER SYMPTOMS
PAIN SEVERITY GOAL: 0/10
PAIN LOCATION: LEFT KNEE
HIGHEST PAIN SEVERITY IN PAST 24 HOURS: 8/10
PAIN LOCATION: LEFT HAND
PAIN LOCATION - PAIN SEVERITY: 5/10
LOWEST PAIN SEVERITY IN PAST 24 HOURS: 3/10
PAIN: 1
PERSON REPORTING PAIN: PATIENT
SUBJECTIVE PAIN PROGRESSION: GRADUALLY IMPROVING
PAIN LOCATION - PAIN SEVERITY: 5/10

## 2024-01-12 ASSESSMENT — ACTIVITIES OF DAILY LIVING (ADL)
AMBULATION ASSISTANCE: 1
AMBULATION ASSISTANCE: INDEPENDENT
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
## 2024-01-13 PROCEDURE — 1090000001 HH PPS REVENUE CREDIT

## 2024-01-13 PROCEDURE — 1090000002 HH PPS REVENUE DEBIT

## 2024-01-14 PROCEDURE — 1090000001 HH PPS REVENUE CREDIT

## 2024-01-14 PROCEDURE — 1090000002 HH PPS REVENUE DEBIT

## 2024-01-14 ASSESSMENT — ENCOUNTER SYMPTOMS
APPETITE LEVEL: GOOD
PAIN LOCATION - PAIN SEVERITY: 3/10
PAIN LOCATION: GENERALIZED
HIGHEST PAIN SEVERITY IN PAST 24 HOURS: 5/10
PERSON REPORTING PAIN: PATIENT
PAIN: 1
CHANGE IN APPETITE: UNCHANGED
LOWEST PAIN SEVERITY IN PAST 24 HOURS: 2/10
LOWER EXTREMITY EDEMA: 1
MUSCLE WEAKNESS: 1
SUBJECTIVE PAIN PROGRESSION: WAXING AND WANING
PAIN SEVERITY GOAL: 0/10

## 2024-01-14 ASSESSMENT — ACTIVITIES OF DAILY LIVING (ADL)
ENTERING_EXITING_HOME: STAND BY ASSIST
OASIS_M1830: 05

## 2024-01-15 ENCOUNTER — HOME CARE VISIT (OUTPATIENT)
Dept: HOME HEALTH SERVICES | Facility: HOME HEALTH | Age: 69
End: 2024-01-15
Payer: MEDICARE

## 2024-01-15 VITALS
OXYGEN SATURATION: 97 % | RESPIRATION RATE: 18 BRPM | SYSTOLIC BLOOD PRESSURE: 134 MMHG | DIASTOLIC BLOOD PRESSURE: 73 MMHG | HEART RATE: 103 BPM

## 2024-01-15 PROCEDURE — G0152 HHCP-SERV OF OT,EA 15 MIN: HCPCS | Mod: HHH

## 2024-01-15 PROCEDURE — 1090000002 HH PPS REVENUE DEBIT

## 2024-01-15 PROCEDURE — 1090000001 HH PPS REVENUE CREDIT

## 2024-01-15 PROCEDURE — G0153 HHCP-SVS OF S/L PATH,EA 15MN: HCPCS | Mod: HHH

## 2024-01-15 SDOH — ECONOMIC STABILITY: HOUSING INSECURITY: EVIDENCE OF SMOKING MATERIAL: 0

## 2024-01-15 SDOH — HEALTH STABILITY: MENTAL HEALTH: SMOKING IN HOME: 0

## 2024-01-15 ASSESSMENT — ENCOUNTER SYMPTOMS
PERSON REPORTING PAIN: PATIENT
PAIN LOCATION - PAIN SEVERITY: 5/10
PERSON REPORTING PAIN: PATIENT
PAIN SEVERITY GOAL: 4/10
PAIN LOCATION: LEFT LEG
PAIN LOCATION: LEFT ARM
SUBJECTIVE PAIN PROGRESSION: UNCHANGED
PAIN LOCATION - RELIEVING FACTORS: GABAPENTIN
PAIN LOCATION - PAIN QUALITY: ACHING
PAIN LOCATION - PAIN FREQUENCY: CONSTANT
PAIN: 1
DYSPNEA WITH EXERTION: 1
LOWEST PAIN SEVERITY IN PAST 24 HOURS: 5/10
PAIN LOCATION - PAIN SEVERITY: 5/10
HIGHEST PAIN SEVERITY IN PAST 24 HOURS: 5/10

## 2024-01-15 ASSESSMENT — PAIN SCALES - PAIN ASSESSMENT IN ADVANCED DEMENTIA (PAINAD)
BREATHING: 0
CONSOLABILITY: 0
NEGVOCALIZATION: 0 - NONE.
BODYLANGUAGE: 0
FACIALEXPRESSION: 0 - SMILING OR INEXPRESSIVE.
TOTALSCORE: 0
NEGVOCALIZATION: 0
FACIALEXPRESSION: 0
CONSOLABILITY: 0 - NO NEED TO CONSOLE.
BODYLANGUAGE: 0 - RELAXED.

## 2024-01-15 ASSESSMENT — ACTIVITIES OF DAILY LIVING (ADL): PHYSICAL TRANSFERS ASSESSED: 1

## 2024-01-16 ENCOUNTER — OFFICE VISIT (OUTPATIENT)
Dept: PRIMARY CARE | Facility: CLINIC | Age: 69
End: 2024-01-16
Payer: MEDICARE

## 2024-01-16 ENCOUNTER — HOME CARE VISIT (OUTPATIENT)
Dept: HOME HEALTH SERVICES | Facility: HOME HEALTH | Age: 69
End: 2024-01-16
Payer: MEDICARE

## 2024-01-16 ENCOUNTER — LAB (OUTPATIENT)
Dept: LAB | Facility: LAB | Age: 69
End: 2024-01-16
Payer: MEDICARE

## 2024-01-16 VITALS
OXYGEN SATURATION: 98 % | SYSTOLIC BLOOD PRESSURE: 114 MMHG | BODY MASS INDEX: 32.49 KG/M2 | RESPIRATION RATE: 18 BRPM | HEART RATE: 78 BPM | DIASTOLIC BLOOD PRESSURE: 52 MMHG | HEIGHT: 67 IN | WEIGHT: 207 LBS

## 2024-01-16 DIAGNOSIS — M32.9 SYSTEMIC LUPUS ERYTHEMATOSUS, UNSPECIFIED SLE TYPE, UNSPECIFIED ORGAN INVOLVEMENT STATUS (MULTI): ICD-10-CM

## 2024-01-16 DIAGNOSIS — I13.0 HYPERTENSIVE HEART AND CHRONIC KIDNEY DISEASE WITH HEART FAILURE AND STAGE 1 THROUGH STAGE 4 CHRONIC KIDNEY DISEASE, OR UNSPECIFIED CHRONIC KIDNEY DISEASE (MULTI): ICD-10-CM

## 2024-01-16 DIAGNOSIS — E11.9 TYPE 2 DIABETES MELLITUS WITHOUT COMPLICATION, WITHOUT LONG-TERM CURRENT USE OF INSULIN (MULTI): ICD-10-CM

## 2024-01-16 DIAGNOSIS — M06.9 RHEUMATOID ARTHRITIS, INVOLVING UNSPECIFIED SITE, UNSPECIFIED WHETHER RHEUMATOID FACTOR PRESENT (MULTI): ICD-10-CM

## 2024-01-16 DIAGNOSIS — R53.1 LEFT-SIDED WEAKNESS: ICD-10-CM

## 2024-01-16 DIAGNOSIS — M35.00 SJOGREN'S SYNDROME, WITH UNSPECIFIED ORGAN INVOLVEMENT (MULTI): ICD-10-CM

## 2024-01-16 DIAGNOSIS — E55.9 VITAMIN D DEFICIENCY: ICD-10-CM

## 2024-01-16 DIAGNOSIS — E03.9 HYPOTHYROIDISM, UNSPECIFIED TYPE: ICD-10-CM

## 2024-01-16 DIAGNOSIS — M17.0 PRIMARY OSTEOARTHRITIS OF BOTH KNEES: ICD-10-CM

## 2024-01-16 DIAGNOSIS — Z09 HOSPITAL DISCHARGE FOLLOW-UP: Primary | ICD-10-CM

## 2024-01-16 PROBLEM — J18.9 PNEUMONIA OF LEFT LUNG DUE TO INFECTIOUS ORGANISM: Status: RESOLVED | Noted: 2023-05-21 | Resolved: 2024-01-16

## 2024-01-16 LAB
25(OH)D3 SERPL-MCNC: 57 NG/ML (ref 30–100)
ERYTHROCYTE [SEDIMENTATION RATE] IN BLOOD BY WESTERGREN METHOD: 15 MM/H (ref 0–20)
T4 FREE SERPL-MCNC: 0.98 NG/DL (ref 0.61–1.12)
TSH SERPL-ACNC: 1.89 MIU/L (ref 0.44–3.98)
VIT B12 SERPL-MCNC: 429 PG/ML (ref 211–911)

## 2024-01-16 PROCEDURE — 84443 ASSAY THYROID STIM HORMONE: CPT

## 2024-01-16 PROCEDURE — 1126F AMNT PAIN NOTED NONE PRSNT: CPT | Performed by: NURSE PRACTITIONER

## 2024-01-16 PROCEDURE — 1090000001 HH PPS REVENUE CREDIT

## 2024-01-16 PROCEDURE — 83036 HEMOGLOBIN GLYCOSYLATED A1C: CPT

## 2024-01-16 PROCEDURE — 82607 VITAMIN B-12: CPT

## 2024-01-16 PROCEDURE — 3078F DIAST BP <80 MM HG: CPT | Performed by: NURSE PRACTITIONER

## 2024-01-16 PROCEDURE — 1036F TOBACCO NON-USER: CPT | Performed by: NURSE PRACTITIONER

## 2024-01-16 PROCEDURE — 99496 TRANSJ CARE MGMT HIGH F2F 7D: CPT | Performed by: NURSE PRACTITIONER

## 2024-01-16 PROCEDURE — 84439 ASSAY OF FREE THYROXINE: CPT

## 2024-01-16 PROCEDURE — 1159F MED LIST DOCD IN RCRD: CPT | Performed by: NURSE PRACTITIONER

## 2024-01-16 PROCEDURE — 1090000002 HH PPS REVENUE DEBIT

## 2024-01-16 PROCEDURE — 1160F RVW MEDS BY RX/DR IN RCRD: CPT | Performed by: NURSE PRACTITIONER

## 2024-01-16 PROCEDURE — 3074F SYST BP LT 130 MM HG: CPT | Performed by: NURSE PRACTITIONER

## 2024-01-16 PROCEDURE — 4010F ACE/ARB THERAPY RXD/TAKEN: CPT | Performed by: NURSE PRACTITIONER

## 2024-01-16 PROCEDURE — 82306 VITAMIN D 25 HYDROXY: CPT

## 2024-01-16 PROCEDURE — 1111F DSCHRG MED/CURRENT MED MERGE: CPT | Performed by: NURSE PRACTITIONER

## 2024-01-16 PROCEDURE — 85652 RBC SED RATE AUTOMATED: CPT

## 2024-01-16 PROCEDURE — 36415 COLL VENOUS BLD VENIPUNCTURE: CPT

## 2024-01-16 RX ORDER — ATORVASTATIN CALCIUM 40 MG/1
40 TABLET, FILM COATED ORAL DAILY
COMMUNITY
Start: 2024-01-13 | End: 2024-01-16 | Stop reason: SDUPTHER

## 2024-01-16 RX ORDER — ACETAMINOPHEN 500 MG
5000 TABLET ORAL DAILY
Qty: 90 TABLET | Refills: 3 | Status: SHIPPED | OUTPATIENT
Start: 2024-01-16

## 2024-01-16 RX ORDER — METOPROLOL TARTRATE 75 MG/1
75 TABLET, FILM COATED ORAL 2 TIMES DAILY
Qty: 180 TABLET | Refills: 3 | Status: SHIPPED | OUTPATIENT
Start: 2024-01-16

## 2024-01-16 RX ORDER — ATORVASTATIN CALCIUM 40 MG/1
40 TABLET, FILM COATED ORAL DAILY
Qty: 90 TABLET | Refills: 3 | Status: SHIPPED | OUTPATIENT
Start: 2024-01-16

## 2024-01-16 RX ORDER — ACETAMINOPHEN 325 MG/1
325 TABLET ORAL 4 TIMES DAILY
Qty: 360 TABLET | Refills: 3 | Status: SHIPPED | OUTPATIENT
Start: 2024-01-16

## 2024-01-16 NOTE — PATIENT INSTRUCTIONS
Increase the Hydroxychloroquibne to 1 tab am and pm x 30 days then go back to 1 tab daily  Get your labs done, I will notify when they are ready  Stop the vitamin d 1,000 units a day and change to 5,000 units a day  All refills are sent.   Neurology- referral.   Make sure to keep you're opthal appointment since you are on Plaquinel  I am wondering if you have a flare of your autoimmune disease causing the neurologic changes. Your CT and MRI of the brain were negative.

## 2024-01-16 NOTE — PROGRESS NOTES
Subjective   Patient ID: Eugenio Bliss is a 68 y.o. male who presents for Hospital Follow-up (Eugenio is in today for hospital F/U, states that he was hospitalized last week for 5 days due to left side numbness, denies any change in medication. ).    68-year-old male here for hospital follow-up.  He was admitted last week for left-sided weakness which was acute.  He states he notices some mild slurred speech that comes and goes.  Decreased sensation left upper extremity left lower extremity.  Feels like the left foot is very weak when he walks.  He had MRI and CT of the brain both were negative for acute stroke.  He had a cardiovascular workup which did not show anything acute.  He was discharged from the hospital with no real acute diagnosis and was set up for outpatient physical therapy.  Accompanied by POA.  Medical history reviewed significant for lupus, RA, Sjogren's, hypothyroid, chronic back pain, osteoarthritis, gout.  He is involved with pain management on chronic Tylenol 325 mg p.o. 4 times a day, gabapentin 100 mg p.o. 3 times daily, and the occasional use of NSAIDs.  He does have chronic numbness and weakness of the left side but he states this is more exaggerated.  Pain management note is reviewed.  He is due to have an injection of the lumbar spine secondary to some bulging disks at L3-L4, L4-L5, L5-S1  Denies neurologic changes such as bowel bladder incontinence, mild blurred vision acute on chronic due to see ophthalmology in February for his routine visit.  He is on hydroxychloroquine 200 mg p.o. daily  Chronic anxiety and depression treated by psychiatry he is on hydroxyzine, Latuda, Buspar, Trazodone, Viibryd  He does not see rheumatology for all of his autoimmune rheumatologic conditions.  Sed rate in the hospital was over 50  He brought all of his pill bottles for review         Review of Systems   All other systems reviewed and are negative.      Objective   /52   Pulse 78   Resp 18    "Ht 1.702 m (5' 7\")   Wt 93.9 kg (207 lb)   SpO2 98%   BMI 32.42 kg/m²     Physical Exam  Vitals and nursing note reviewed.   Constitutional:       General: He is not in acute distress.     Appearance: Normal appearance. He is obese.   HENT:      Head: Normocephalic and atraumatic.      Mouth/Throat:      Mouth: Mucous membranes are moist.      Pharynx: Oropharynx is clear.   Eyes:      Extraocular Movements: Extraocular movements intact.      Conjunctiva/sclera: Conjunctivae normal.      Pupils: Pupils are equal, round, and reactive to light.   Neck:      Vascular: No carotid bruit.   Cardiovascular:      Rate and Rhythm: Normal rate and regular rhythm.      Pulses: Normal pulses.      Heart sounds: Normal heart sounds.   Pulmonary:      Effort: Pulmonary effort is normal.      Breath sounds: Normal breath sounds.   Abdominal:      General: Bowel sounds are normal. There is no distension.      Palpations: Abdomen is soft.      Tenderness: There is no abdominal tenderness.   Musculoskeletal:         General: Normal range of motion.      Cervical back: Normal range of motion.      Right lower leg: No edema.      Left lower leg: No edema.   Lymphadenopathy:      Cervical: No cervical adenopathy.   Skin:     General: Skin is warm and dry.      Capillary Refill: Capillary refill takes less than 2 seconds.   Neurological:      General: No focal deficit present.      Mental Status: He is alert and oriented to person, place, and time.      Sensory: Sensory deficit (Decreased sensation left shin and left forearm) present.      Coordination: Coordination normal.      Gait: Gait normal.   Psychiatric:         Mood and Affect: Mood normal.         Behavior: Behavior normal.         Thought Content: Thought content normal.         Judgment: Judgment normal.         Assessment/Plan   Diagnoses and all orders for this visit:  Hospital discharge follow-up  Comments:  Meds reviewed labs ordered.  Follow-up in 1 months to make " sure that he is staying well  Left-sided weakness  Comments:  acute on chronic. I wonder if r/t his autoimmune disease and or spine. Referral placed for neurology.  Orders:  -     Referral to Neurology; Future  -     Sedimentation Rate; Future  -     Vitamin B12; Future  Hypertensive heart and chronic kidney disease with heart failure and stage 1 through stage 4 chronic kidney disease, or unspecified chronic kidney disease (CMS/[facility])  Comments:  meds reviewed. refills complete as appropriate  Orders:  -     metoprolol tartrate (Lopressor) 75 mg tablet; Take 1 tablet (75 mg) by mouth 2 times a day.  -     atorvastatin (Lipitor) 40 mg tablet; Take 1 tablet (40 mg) by mouth once daily.  Hypothyroidism, unspecified type  Comments:  update TSH, FT4  Orders:  -     Thyroid Stimulating Hormone; Future  -     Thyroxine, Free; Future  Rheumatoid arthritis, involving unspecified site, unspecified whether rheumatoid factor present (CMS/[facility])  Comments:  chronic pain  Orders:  -     Sedimentation Rate; Future  Sjogren's syndrome, with unspecified organ involvement (CMS/[facility])  Comments:  chronic S&S and pain  Orders:  -     Sedimentation Rate; Future  Systemic lupus erythematosus, unspecified SLE type, unspecified organ involvement status (CMS/[facility])  -     Sedimentation Rate; Future  Vitamin D deficiency  Comments:  increase the Vitamin D to 5,000 units  a day with food  Orders:  -     Vitamin D 25-Hydroxy,Total (for eval of Vitamin D levels); Future  -     cholecalciferol (Vitamin D-3) 5,000 Units tablet; Take 1 tablet (5,000 Units) by mouth once daily.  Primary osteoarthritis of both knees  Comments:  chronic pain. update Sed rate which was elevated in the hospital  Orders:  -     acetaminophen (Tylenol) 325 mg tablet; Take 1 tablet (325 mg) by mouth 4 times a day.  Type 2 diabetes mellitus without complication, without long-term current use of insulin (CMS/[facility])  Comments:  Rare monitoring of blood sugars on metformin. update  A1C  Orders:  -     Hemoglobin A1C; Future  Other orders  -     Follow Up In Primary Care - Established; Future

## 2024-01-17 DIAGNOSIS — E11.9 TYPE 2 DIABETES MELLITUS WITHOUT COMPLICATION, WITHOUT LONG-TERM CURRENT USE OF INSULIN (MULTI): Primary | ICD-10-CM

## 2024-01-17 LAB
EST. AVERAGE GLUCOSE BLD GHB EST-MCNC: 94 MG/DL
HBA1C MFR BLD: 4.9 %

## 2024-01-17 PROCEDURE — 1090000001 HH PPS REVENUE CREDIT

## 2024-01-17 PROCEDURE — 1090000002 HH PPS REVENUE DEBIT

## 2024-01-18 ENCOUNTER — HOSPITAL ENCOUNTER (OUTPATIENT)
Dept: GASTROENTEROLOGY | Facility: HOSPITAL | Age: 69
Discharge: HOME | End: 2024-01-18
Payer: MEDICARE

## 2024-01-18 ENCOUNTER — HOSPITAL ENCOUNTER (OUTPATIENT)
Dept: RADIOLOGY | Facility: HOSPITAL | Age: 69
Discharge: HOME | End: 2024-01-18
Payer: MEDICARE

## 2024-01-18 VITALS
TEMPERATURE: 97.7 F | HEIGHT: 67 IN | SYSTOLIC BLOOD PRESSURE: 129 MMHG | DIASTOLIC BLOOD PRESSURE: 83 MMHG | BODY MASS INDEX: 32.49 KG/M2 | OXYGEN SATURATION: 98 % | WEIGHT: 207 LBS | RESPIRATION RATE: 19 BRPM | HEART RATE: 78 BPM

## 2024-01-18 DIAGNOSIS — M48.062 SPINAL STENOSIS OF LUMBAR REGION WITH NEUROGENIC CLAUDICATION: ICD-10-CM

## 2024-01-18 PROCEDURE — 2500000004 HC RX 250 GENERAL PHARMACY W/ HCPCS (ALT 636 FOR OP/ED): Performed by: ANESTHESIOLOGY

## 2024-01-18 PROCEDURE — 64483 NJX AA&/STRD TFRM EPI L/S 1: CPT | Performed by: ANESTHESIOLOGY

## 2024-01-18 PROCEDURE — 99152 MOD SED SAME PHYS/QHP 5/>YRS: CPT | Performed by: ANESTHESIOLOGY

## 2024-01-18 PROCEDURE — 64483 NJX AA&/STRD TFRM EPI L/S 1: CPT | Mod: 50 | Performed by: ANESTHESIOLOGY

## 2024-01-18 PROCEDURE — 1090000001 HH PPS REVENUE CREDIT

## 2024-01-18 PROCEDURE — 76000 FLUOROSCOPY <1 HR PHYS/QHP: CPT

## 2024-01-18 PROCEDURE — 1090000002 HH PPS REVENUE DEBIT

## 2024-01-18 RX ORDER — MIDAZOLAM HYDROCHLORIDE 1 MG/ML
2 INJECTION, SOLUTION INTRAMUSCULAR; INTRAVENOUS ONCE
Status: COMPLETED | OUTPATIENT
Start: 2024-01-18 | End: 2024-01-18

## 2024-01-18 RX ORDER — SODIUM CHLORIDE, SODIUM LACTATE, POTASSIUM CHLORIDE, CALCIUM CHLORIDE 600; 310; 30; 20 MG/100ML; MG/100ML; MG/100ML; MG/100ML
100 INJECTION, SOLUTION INTRAVENOUS CONTINUOUS
Status: DISCONTINUED | OUTPATIENT
Start: 2024-01-18 | End: 2024-01-19 | Stop reason: HOSPADM

## 2024-01-18 RX ORDER — FENTANYL CITRATE 50 UG/ML
100 INJECTION, SOLUTION INTRAMUSCULAR; INTRAVENOUS ONCE
Status: DISCONTINUED | OUTPATIENT
Start: 2024-01-18 | End: 2024-01-19 | Stop reason: HOSPADM

## 2024-01-18 RX ADMIN — MIDAZOLAM HYDROCHLORIDE 2 MG: 1 INJECTION, SOLUTION INTRAMUSCULAR; INTRAVENOUS at 08:07

## 2024-01-18 ASSESSMENT — ENCOUNTER SYMPTOMS
CONSTITUTIONAL NEGATIVE: 1
RESPIRATORY NEGATIVE: 1
ENDOCRINE NEGATIVE: 1
PSYCHIATRIC NEGATIVE: 1
GASTROINTESTINAL NEGATIVE: 1
WEAKNESS: 1
NUMBNESS: 1
EYES NEGATIVE: 1
CARDIOVASCULAR NEGATIVE: 1
BACK PAIN: 1
HEMATOLOGIC/LYMPHATIC NEGATIVE: 1

## 2024-01-18 ASSESSMENT — PAIN - FUNCTIONAL ASSESSMENT
PAIN_FUNCTIONAL_ASSESSMENT: 0-10
PAIN_FUNCTIONAL_ASSESSMENT: 0-10

## 2024-01-18 ASSESSMENT — PAIN DESCRIPTION - DESCRIPTORS: DESCRIPTORS: SHARP;JABBING

## 2024-01-18 ASSESSMENT — PAIN SCALES - GENERAL
PAINLEVEL_OUTOF10: 5 - MODERATE PAIN
PAINLEVEL_OUTOF10: 5 - MODERATE PAIN

## 2024-01-18 NOTE — POST-PROCEDURE NOTE
0820: Pt returned alert and oriented. Pt is not in any distress. Pt states pain is a 5/10 to back. Band aids to back are c/d/I.    0834: Pt is able to stand and ambulate without difficulty to wheelchair. Pt educated on discharge instructions.

## 2024-01-18 NOTE — DISCHARGE INSTRUCTIONS
DISCHARGEINSTRUCTIONS FOR INJECTIONS     You underwent a lumbar epidural steroid injection today. YOU MAY RESUME YOUR ELIQUIS TOMORROW    Aftermost injections, it is recommended that you relax and limit your activity for the remainder of the day unless you have been told otherwise by your pain physician.  You should not drive a car, operate machinery, or make important legal decisions unless otherwise directed by your pain physician.  You may resume your normal activity, including exercise, tomorrow.      Keep a written pain diary of how much pain relief you experienced following the injection procedure and the length of time of pain relief you experienced pain relief. Following diagnostic injections like medial branch nerve blocks, sacroiliac joint blocks, stellate ganglion injections and other blocks, it is very important you record the specific amount of pain relief you experienced immediately after the injectionand how long it lasted. Your doctor will ask you for this information at your follow up visit.     For all injections, please keep the injection site dry and inspect the site for a couple of days. You may remove the Band-Aid the day of the injection at any time.     Some discomfort, bruising or slight swelling may occur at the injection site. This is not abnormal if it occurs.  If needed you may:    -Take over the counter medication such as Tylenol or Motrin.   -Apply an ice pack for 30 minutes, 2 to 3 times a day for the first 24 hours.     You may shower today; no soaking baths, hot tubs, whirlpools or swimming pools for two days.      If you are given steroids in your injection, it may take 3-5 days for the steroid medication to take effect. You may notice a worsening of your symptoms for 1-2 days after the injection. This is not abnormal.  You may use acetaminophen, ibuprofen, or prescription medication that your doctor may have prescribed for you if you need to do so.     A few common side effects of  steroids include facial flushing, sweating, restlessness, irritability,difficulty sleeping, increase in blood sugar, and increased blood pressure. If you have diabetes, please monitor your blood sugar at least once a day for at least 5 days. If you have poorly controlled high blood pressure, monitoryour blood pressure for at least 2 days and contact your primary care physician if these numbers are unusually high for you.      If you take aspirin or non-steroidal anti-inflammatory drugs (examples are Motrin, Advil, ibuprofen, Naprosyn, Voltaren, Relafen, etc.) you may restart these this evening, but stop taking it 3 days before your next appointment, unless instructed otherwiseby your physician.      You do not need to discontinue non-aspirin-containing pain medications prior to an injection (examples: Celebrex, tramadol, hydrocodone and acetaminophen).      If you take a blood thinning medication (Coumadin, Lovenox, Fragmin,Ticlid, Plavix, Pradaxa, etc.), please discuss this with your primary care physician/cardiologist and your pain physician. These medications MUST be discontinued before you can have an injection safely, without the risk of uncontrolled bleeding. If these medications are not discontinued for an appropriate period of time, you will not be able to receivean injection.      If you are taking Coumadin, please have your INR checked the morning of your procedure and bringthe result to your appointment unless otherwise instructed. If your INR is over 1.2, your injection may need to be rescheduled to avoid uncontrolled bleeding from the needle placement.     Call [facility] at [phone] between 8am-4pm Monday - Friday if you are experiencing the following:    If you received an epidural or spinal injection:    -Headache that doesnot go away with medicine, is worse when sitting or standing up, and is greatly relieved upon lying down.   -Severe pain worse than or different than your  baseline pain.   -Chills or fever (101º F or greater).   -Drainage or signs of infection at the injection site     Go directly to the Emergency Department if you are experiencing the following and received an epidural or spinal injection:   -Abrupt weakness or progressive weakness in your legs that starts after you leave the clinic.   -Abrupt severe or worsening numbness in your legs.   -Inability to urinate after the injection or loss of bowel or bladder control without the urge to defecate or urinate.     If you have a clinical question that cannot wait until your next appointment, please call [phone] between 8am-4pm Monday - Friday or send a Testt message. We do our best to return all non-emergency messages within 24 hours, Monday - Friday. A nurse or physician will return your message.      If you need to cancel an appointment, please call the scheduling staff at [phone] during normal business hours or leave a message at least 24 hours in advance.     If you are going to be sedated for your next procedure, you MUST have responsible adult who can legally drive accompany you home. You cannot eat or drink for eight hours prior to the planned procedure if you are going to receive sedation. You may take your non-blood thinning medications with a small sip of water.
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
## 2024-01-18 NOTE — OP NOTE
PROCEDURE  Bilateral L4-5 transforaminal epidural steroid injection    LOCATION:  [facility]    Informed consent completed.  Patient brought to procedure suite and positioned on table prone.  Lower back prepped with chlorhexidine and draped in sterile fashion.  Fluoroscopy utilized to identify L4-5 level.  C-arm was obliqued to left side to visualize L4-5 foramen.  1.5 inch 25-gauge needle used to administer 1% lidocaine for local anesthesia.  Then, 3.5 inch 22-gauge needle were advanced coaxially to superolateral aspect of foramina via x-ray guidance.  AP view utilized to visualize needles approaching junction of the transverse process and vertebral body.  Lateral view taken to visualize needles just anterior to posterior spinal column.  Needle was then advanced slightly and after negative add aspiration, 1.5 cc Omnipaque 240 administered at each level revealing adequate epidural spread without vascular uptake.  AP view taken to confirm epidural spread medially without vascular uptake.     After negative aspiration, 7.5 mg dexamethasone +1.25  cc preservative-free saline injected.      Procedure was then repeated in similar fashion on the right side without issue.       Total Injectate: 15 mg dexamethasone + 2.5 ml PF NS    Needles re-styleted and removed.  Hemostasis achieved.  Band-Aids placed.  Patient tolerated procedure well and without issue.  They were discharged home in stable condition.      Titi Carson MD  Anesthesiologist & Interventional Pain Physician   [facility] [place]  O: [phone]  F: [phone]  8:05 AM  01/18/24

## 2024-01-19 ENCOUNTER — APPOINTMENT (OUTPATIENT)
Dept: HOME HEALTH SERVICES | Facility: HOME HEALTH | Age: 69
End: 2024-01-19
Payer: MEDICARE

## 2024-01-19 PROCEDURE — 1090000002 HH PPS REVENUE DEBIT

## 2024-01-19 PROCEDURE — 1090000001 HH PPS REVENUE CREDIT

## 2024-01-20 PROCEDURE — 1090000001 HH PPS REVENUE CREDIT

## 2024-01-20 PROCEDURE — 1090000002 HH PPS REVENUE DEBIT

## 2024-01-21 PROCEDURE — 1090000002 HH PPS REVENUE DEBIT

## 2024-01-21 PROCEDURE — 1090000001 HH PPS REVENUE CREDIT

## 2024-01-22 ENCOUNTER — HOME CARE VISIT (OUTPATIENT)
Dept: HOME HEALTH SERVICES | Facility: HOME HEALTH | Age: 69
End: 2024-01-22
Payer: MEDICARE

## 2024-01-22 ENCOUNTER — PATIENT OUTREACH (OUTPATIENT)
Dept: PRIMARY CARE | Facility: CLINIC | Age: 69
End: 2024-01-22
Payer: MEDICARE

## 2024-01-22 VITALS
OXYGEN SATURATION: 98 % | HEART RATE: 98 BPM | SYSTOLIC BLOOD PRESSURE: 119 MMHG | TEMPERATURE: 97.6 F | DIASTOLIC BLOOD PRESSURE: 80 MMHG | RESPIRATION RATE: 18 BRPM

## 2024-01-22 PROCEDURE — 1090000001 HH PPS REVENUE CREDIT

## 2024-01-22 PROCEDURE — 1090000002 HH PPS REVENUE DEBIT

## 2024-01-22 PROCEDURE — G0158 HHC OT ASSISTANT EA 15: HCPCS | Mod: HHH

## 2024-01-22 ASSESSMENT — PAIN DESCRIPTION - PAIN TYPE: TYPE: CHRONIC PAIN

## 2024-01-22 NOTE — PROGRESS NOTES
Call regarding appt. with PCP on 1/16/24 after hospitalization.  At time of outreach call the patient feels as if their condition has improved since last visit.    States doing good.  No questions or concerns at this time.

## 2024-01-23 PROCEDURE — 1090000001 HH PPS REVENUE CREDIT

## 2024-01-23 PROCEDURE — 1090000002 HH PPS REVENUE DEBIT

## 2024-01-24 ENCOUNTER — APPOINTMENT (OUTPATIENT)
Dept: PAIN MEDICINE | Facility: CLINIC | Age: 69
End: 2024-01-24
Payer: MEDICARE

## 2024-01-24 PROCEDURE — 1090000002 HH PPS REVENUE DEBIT

## 2024-01-24 PROCEDURE — 1090000001 HH PPS REVENUE CREDIT

## 2024-01-25 ENCOUNTER — APPOINTMENT (OUTPATIENT)
Dept: PHARMACY | Facility: HOSPITAL | Age: 69
End: 2024-01-25
Payer: MEDICARE

## 2024-01-25 PROCEDURE — 1090000002 HH PPS REVENUE DEBIT

## 2024-01-25 PROCEDURE — 1090000001 HH PPS REVENUE CREDIT

## 2024-01-26 PROCEDURE — 1090000002 HH PPS REVENUE DEBIT

## 2024-01-26 PROCEDURE — 1090000001 HH PPS REVENUE CREDIT

## 2024-01-27 PROCEDURE — 1090000001 HH PPS REVENUE CREDIT

## 2024-01-27 PROCEDURE — 1090000002 HH PPS REVENUE DEBIT

## 2024-01-28 PROCEDURE — 1090000002 HH PPS REVENUE DEBIT

## 2024-01-28 PROCEDURE — 1090000001 HH PPS REVENUE CREDIT

## 2024-01-28 NOTE — HOME HEALTH
Single visit evaluation.  Referred SLP and OT due to patient's reports of deficits centered around those disciplines.   No need for second Home P.T. visit.

## 2024-01-29 ENCOUNTER — HOME CARE VISIT (OUTPATIENT)
Dept: HOME HEALTH SERVICES | Facility: HOME HEALTH | Age: 69
End: 2024-01-29
Payer: MEDICARE

## 2024-01-29 PROCEDURE — 1090000002 HH PPS REVENUE DEBIT

## 2024-01-29 PROCEDURE — 1090000001 HH PPS REVENUE CREDIT

## 2024-01-29 PROCEDURE — G0180 MD CERTIFICATION HHA PATIENT: HCPCS | Performed by: NURSE PRACTITIONER

## 2024-01-30 PROCEDURE — 1090000001 HH PPS REVENUE CREDIT

## 2024-01-30 PROCEDURE — 1090000002 HH PPS REVENUE DEBIT

## 2024-01-31 PROCEDURE — 1090000001 HH PPS REVENUE CREDIT

## 2024-01-31 PROCEDURE — 1090000002 HH PPS REVENUE DEBIT

## 2024-02-01 PROCEDURE — 1090000001 HH PPS REVENUE CREDIT

## 2024-02-01 PROCEDURE — 1090000002 HH PPS REVENUE DEBIT

## 2024-02-02 ENCOUNTER — OFFICE VISIT (OUTPATIENT)
Dept: PAIN MEDICINE | Facility: CLINIC | Age: 69
End: 2024-02-02
Payer: MEDICARE

## 2024-02-02 VITALS
SYSTOLIC BLOOD PRESSURE: 106 MMHG | HEART RATE: 100 BPM | RESPIRATION RATE: 18 BRPM | BODY MASS INDEX: 30.66 KG/M2 | HEIGHT: 69 IN | WEIGHT: 207 LBS | DIASTOLIC BLOOD PRESSURE: 71 MMHG

## 2024-02-02 DIAGNOSIS — M54.50 CHRONIC LOW BACK PAIN, UNSPECIFIED BACK PAIN LATERALITY, UNSPECIFIED WHETHER SCIATICA PRESENT: ICD-10-CM

## 2024-02-02 DIAGNOSIS — M54.16 LUMBAR RADICULOPATHY: ICD-10-CM

## 2024-02-02 DIAGNOSIS — M47.816 LUMBAR SPONDYLOSIS: Primary | ICD-10-CM

## 2024-02-02 DIAGNOSIS — Z79.01 CURRENT USE OF LONG TERM ANTICOAGULATION: ICD-10-CM

## 2024-02-02 DIAGNOSIS — G89.29 CHRONIC LOW BACK PAIN, UNSPECIFIED BACK PAIN LATERALITY, UNSPECIFIED WHETHER SCIATICA PRESENT: ICD-10-CM

## 2024-02-02 PROCEDURE — 1157F ADVNC CARE PLAN IN RCRD: CPT | Performed by: ANESTHESIOLOGY

## 2024-02-02 PROCEDURE — 3044F HG A1C LEVEL LT 7.0%: CPT | Performed by: ANESTHESIOLOGY

## 2024-02-02 PROCEDURE — 99214 OFFICE O/P EST MOD 30 MIN: CPT | Mod: 27 | Performed by: ANESTHESIOLOGY

## 2024-02-02 PROCEDURE — 4010F ACE/ARB THERAPY RXD/TAKEN: CPT | Performed by: ANESTHESIOLOGY

## 2024-02-02 PROCEDURE — 1036F TOBACCO NON-USER: CPT | Performed by: ANESTHESIOLOGY

## 2024-02-02 PROCEDURE — 1111F DSCHRG MED/CURRENT MED MERGE: CPT | Performed by: ANESTHESIOLOGY

## 2024-02-02 PROCEDURE — 1090000001 HH PPS REVENUE CREDIT

## 2024-02-02 PROCEDURE — 1160F RVW MEDS BY RX/DR IN RCRD: CPT | Performed by: ANESTHESIOLOGY

## 2024-02-02 PROCEDURE — 1090000002 HH PPS REVENUE DEBIT

## 2024-02-02 PROCEDURE — 1159F MED LIST DOCD IN RCRD: CPT | Performed by: ANESTHESIOLOGY

## 2024-02-02 PROCEDURE — 99214 OFFICE O/P EST MOD 30 MIN: CPT | Performed by: ANESTHESIOLOGY

## 2024-02-02 PROCEDURE — 3078F DIAST BP <80 MM HG: CPT | Performed by: ANESTHESIOLOGY

## 2024-02-02 PROCEDURE — 1126F AMNT PAIN NOTED NONE PRSNT: CPT | Performed by: ANESTHESIOLOGY

## 2024-02-02 PROCEDURE — 3074F SYST BP LT 130 MM HG: CPT | Performed by: ANESTHESIOLOGY

## 2024-02-02 ASSESSMENT — PATIENT HEALTH QUESTIONNAIRE - PHQ9
SUM OF ALL RESPONSES TO PHQ9 QUESTIONS 1 AND 2: 0
1. LITTLE INTEREST OR PLEASURE IN DOING THINGS: NOT AT ALL
2. FEELING DOWN, DEPRESSED OR HOPELESS: NOT AT ALL

## 2024-02-02 ASSESSMENT — ENCOUNTER SYMPTOMS
PSYCHIATRIC NEGATIVE: 1
CARDIOVASCULAR NEGATIVE: 1
ENDOCRINE NEGATIVE: 1
RESPIRATORY NEGATIVE: 1
EYES NEGATIVE: 1
NUMBNESS: 1
CONSTITUTIONAL NEGATIVE: 1
HEMATOLOGIC/LYMPHATIC NEGATIVE: 1
BACK PAIN: 1
GASTROINTESTINAL NEGATIVE: 1
WEAKNESS: 1

## 2024-02-02 ASSESSMENT — PAIN SCALES - GENERAL
PAINLEVEL_OUTOF10: 0 - NO PAIN
PAINLEVEL: 0-NO PAIN

## 2024-02-02 ASSESSMENT — PAIN - FUNCTIONAL ASSESSMENT: PAIN_FUNCTIONAL_ASSESSMENT: 0-10
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
## 2024-02-03 PROCEDURE — 1090000001 HH PPS REVENUE CREDIT

## 2024-02-03 PROCEDURE — 1090000002 HH PPS REVENUE DEBIT

## 2024-02-04 PROCEDURE — 1090000001 HH PPS REVENUE CREDIT

## 2024-02-04 PROCEDURE — 1090000002 HH PPS REVENUE DEBIT

## 2024-02-05 ENCOUNTER — HOME CARE VISIT (OUTPATIENT)
Dept: HOME HEALTH SERVICES | Facility: HOME HEALTH | Age: 69
End: 2024-02-05
Payer: MEDICARE

## 2024-02-05 VITALS
OXYGEN SATURATION: 99 % | TEMPERATURE: 97.9 F | DIASTOLIC BLOOD PRESSURE: 86 MMHG | SYSTOLIC BLOOD PRESSURE: 128 MMHG | HEART RATE: 86 BPM | RESPIRATION RATE: 18 BRPM

## 2024-02-05 PROCEDURE — G0158 HHC OT ASSISTANT EA 15: HCPCS | Mod: HHH

## 2024-02-05 PROCEDURE — 1090000001 HH PPS REVENUE CREDIT

## 2024-02-05 PROCEDURE — 1090000002 HH PPS REVENUE DEBIT

## 2024-02-05 ASSESSMENT — PAIN DESCRIPTION - PAIN TYPE: TYPE: CHRONIC PAIN

## 2024-02-06 PROCEDURE — 1090000001 HH PPS REVENUE CREDIT

## 2024-02-06 PROCEDURE — 1090000002 HH PPS REVENUE DEBIT

## 2024-02-07 ENCOUNTER — PATIENT OUTREACH (OUTPATIENT)
Dept: PRIMARY CARE | Facility: CLINIC | Age: 69
End: 2024-02-07
Payer: MEDICARE

## 2024-02-07 PROCEDURE — 1090000002 HH PPS REVENUE DEBIT

## 2024-02-07 PROCEDURE — 1090000001 HH PPS REVENUE CREDIT

## 2024-02-07 NOTE — PROGRESS NOTES
Call placed regarding one month post discharge follow up call.  At time of outreach call the patient feels as if their condition has improved since initial visit with PCP or specialist.  Spoke with Eugenio.  States doing better since discharge.  Aware to call with questions or concerns.

## 2024-02-08 PROCEDURE — 1090000002 HH PPS REVENUE DEBIT

## 2024-02-08 PROCEDURE — 1090000001 HH PPS REVENUE CREDIT

## 2024-02-09 PROCEDURE — 1090000001 HH PPS REVENUE CREDIT

## 2024-02-09 PROCEDURE — 1090000002 HH PPS REVENUE DEBIT

## 2024-02-10 PROCEDURE — 1090000001 HH PPS REVENUE CREDIT

## 2024-02-10 PROCEDURE — 1090000002 HH PPS REVENUE DEBIT

## 2024-02-11 PROCEDURE — 1090000001 HH PPS REVENUE CREDIT

## 2024-02-11 PROCEDURE — 1090000002 HH PPS REVENUE DEBIT

## 2024-02-12 ENCOUNTER — HOME CARE VISIT (OUTPATIENT)
Dept: HOME HEALTH SERVICES | Facility: HOME HEALTH | Age: 69
End: 2024-02-12
Payer: MEDICARE

## 2024-02-12 PROCEDURE — 1090000003 HH PPS REVENUE ADJ

## 2024-02-12 PROCEDURE — 0023 HH SOC

## 2024-02-12 PROCEDURE — 1090000002 HH PPS REVENUE DEBIT

## 2024-02-12 PROCEDURE — 1090000001 HH PPS REVENUE CREDIT

## 2024-02-12 PROCEDURE — G0152 HHCP-SERV OF OT,EA 15 MIN: HCPCS | Mod: HHH

## 2024-02-12 ASSESSMENT — ENCOUNTER SYMPTOMS
LOSS OF SENSATION IN FEET: 1
PAIN LOCATION - PAIN DURATION: DAILY
PAIN LOCATION - PAIN FREQUENCY: CONSTANT
OCCASIONAL FEELINGS OF UNSTEADINESS: 1
DEPRESSION: 0
PAIN LOCATION - PAIN SEVERITY: 5/10
HIGHEST PAIN SEVERITY IN PAST 24 HOURS: 5/10
PAIN: 1
LOWEST PAIN SEVERITY IN PAST 24 HOURS: 5/10
PAIN LOCATION: BACK
PAIN LOCATION - PAIN QUALITY: ACHING
PAIN SEVERITY GOAL: 5/10
PERSON REPORTING PAIN: PATIENT

## 2024-02-12 ASSESSMENT — PAIN SCALES - PAIN ASSESSMENT IN ADVANCED DEMENTIA (PAINAD)
FACIALEXPRESSION: 0
CONSOLABILITY: 0 - NO NEED TO CONSOLE.
BODYLANGUAGE: 0 - RELAXED.
NEGVOCALIZATION: 0
BODYLANGUAGE: 0
CONSOLABILITY: 0
NEGVOCALIZATION: 0 - NONE.
FACIALEXPRESSION: 0 - SMILING OR INEXPRESSIVE.
BREATHING: 0
TOTALSCORE: 0

## 2024-02-12 ASSESSMENT — ACTIVITIES OF DAILY LIVING (ADL)
OASIS_M1830: 00
HOME_HEALTH_OASIS: 00

## 2024-02-13 ENCOUNTER — APPOINTMENT (OUTPATIENT)
Dept: PRIMARY CARE | Facility: CLINIC | Age: 69
End: 2024-02-13
Payer: MEDICARE

## 2024-02-20 ENCOUNTER — OFFICE VISIT (OUTPATIENT)
Dept: PRIMARY CARE | Facility: CLINIC | Age: 69
End: 2024-02-20
Payer: MEDICARE

## 2024-02-20 VITALS
HEIGHT: 69 IN | DIASTOLIC BLOOD PRESSURE: 66 MMHG | SYSTOLIC BLOOD PRESSURE: 122 MMHG | HEART RATE: 106 BPM | BODY MASS INDEX: 31.25 KG/M2 | WEIGHT: 211 LBS | OXYGEN SATURATION: 95 %

## 2024-02-20 DIAGNOSIS — E03.9 HYPOTHYROIDISM, UNSPECIFIED: ICD-10-CM

## 2024-02-20 DIAGNOSIS — M06.9 RHEUMATOID ARTHRITIS, UNSPECIFIED (MULTI): ICD-10-CM

## 2024-02-20 DIAGNOSIS — K21.9 GASTRO-ESOPHAGEAL REFLUX DISEASE WITHOUT ESOPHAGITIS: ICD-10-CM

## 2024-02-20 DIAGNOSIS — M32.9 SYSTEMIC LUPUS ERYTHEMATOSUS, UNSPECIFIED SLE TYPE, UNSPECIFIED ORGAN INVOLVEMENT STATUS (MULTI): ICD-10-CM

## 2024-02-20 DIAGNOSIS — I48.91 UNSPECIFIED ATRIAL FIBRILLATION (MULTI): ICD-10-CM

## 2024-02-20 PROCEDURE — 1036F TOBACCO NON-USER: CPT | Performed by: NURSE PRACTITIONER

## 2024-02-20 PROCEDURE — 1160F RVW MEDS BY RX/DR IN RCRD: CPT | Performed by: NURSE PRACTITIONER

## 2024-02-20 PROCEDURE — 99213 OFFICE O/P EST LOW 20 MIN: CPT | Performed by: NURSE PRACTITIONER

## 2024-02-20 PROCEDURE — 1157F ADVNC CARE PLAN IN RCRD: CPT | Performed by: NURSE PRACTITIONER

## 2024-02-20 PROCEDURE — 3044F HG A1C LEVEL LT 7.0%: CPT | Performed by: NURSE PRACTITIONER

## 2024-02-20 PROCEDURE — 3078F DIAST BP <80 MM HG: CPT | Performed by: NURSE PRACTITIONER

## 2024-02-20 PROCEDURE — 4010F ACE/ARB THERAPY RXD/TAKEN: CPT | Performed by: NURSE PRACTITIONER

## 2024-02-20 PROCEDURE — 3074F SYST BP LT 130 MM HG: CPT | Performed by: NURSE PRACTITIONER

## 2024-02-20 PROCEDURE — 1159F MED LIST DOCD IN RCRD: CPT | Performed by: NURSE PRACTITIONER

## 2024-02-20 PROCEDURE — 1126F AMNT PAIN NOTED NONE PRSNT: CPT | Performed by: NURSE PRACTITIONER

## 2024-02-20 RX ORDER — PANTOPRAZOLE SODIUM 20 MG/1
20 TABLET, DELAYED RELEASE ORAL DAILY
Qty: 90 TABLET | Refills: 3 | Status: SHIPPED | OUTPATIENT
Start: 2024-02-20

## 2024-02-20 RX ORDER — LOSARTAN POTASSIUM 50 MG/1
75 TABLET ORAL DAILY
Qty: 90 TABLET | Refills: 3 | Status: SHIPPED | OUTPATIENT
Start: 2024-02-20

## 2024-02-20 RX ORDER — LEVOTHYROXINE SODIUM 50 UG/1
50 TABLET ORAL DAILY
Qty: 90 TABLET | Refills: 3 | Status: SHIPPED | OUTPATIENT
Start: 2024-02-20

## 2024-02-20 RX ORDER — HYDROXYCHLOROQUINE SULFATE 200 MG/1
200 TABLET, FILM COATED ORAL DAILY
Qty: 90 TABLET | Refills: 3 | Status: SHIPPED | OUTPATIENT
Start: 2024-02-20

## 2024-02-20 NOTE — PROGRESS NOTES
"Subjective   Patient ID: Eugenio Bliss is a 68 y.o. male who presents for Follow-up (Patient is here today for a 3 month follow up, no complaints. ).    68 year old male here for routine 3 month follow up.   Since I saw him last month his neurologic S&S resolved. He is accompanied by son. They both say he is at baseline.  Asking if he can be cleared to drive   Has seen pain management since last month. No change in meds.  States will be getting a injection in his lumbar spine next month  Has OV with neuro tomorrow  Chronic anxiety depression managed by psychiatry including all meds and refills.  Hypothyroid- euthyroid on T4 recent blood work reviewed looks good  Multiple autoimmune diseases on Plaquenil.  No longer sees rheumatology  Hypertension-well-controlled on losartan, and metoprolol.  No other acute concerns he does need some refills.             Review of Systems    Objective   /90   Pulse 106   Ht 1.753 m (5' 9\")   Wt 95.7 kg (211 lb)   SpO2 95%   BMI 31.16 kg/m²     Physical Exam  Constitutional:       Appearance: He is obese.   Cardiovascular:      Rate and Rhythm: Normal rate and regular rhythm.   Pulmonary:      Effort: Pulmonary effort is normal.      Breath sounds: Normal breath sounds.   Abdominal:      General: Bowel sounds are normal.      Palpations: Abdomen is soft.   Musculoskeletal:         General: Normal range of motion.   Neurological:      Mental Status: He is alert and oriented to person, place, and time.   Psychiatric:         Mood and Affect: Mood normal.         Behavior: Behavior normal.      Comments: Extraparametal symptoms         Assessment/Plan   Diagnoses and all orders for this visit:  Unspecified atrial fibrillation (CMS/[facility])  Comments:  Stable on Eliquis.  Refill sent to the pharmacy per patient request  Orders:  -     apixaban (Eliquis) 5 mg tablet; Take 1 tablet (5 mg) by mouth 2 times a day.  Systemic lupus erythematosus, unspecified SLE type, unspecified organ " involvement status (CMS/[facility])  Comments:  On Plaquenil-recent sed rate reviewed.  Plaquenil refilled per patient request  Orders:  -     hydroxychloroquine (Plaquenil) 200 mg tablet; Take 1 tablet (200 mg) by mouth once daily.  Hypothyroidism, unspecified  Comments:  Levothyroxine reordered.  Most recent TSH looks good  Orders:  -     levothyroxine (Synthroid, Levoxyl) 50 mcg tablet; Take 1 tablet (50 mcg) by mouth once daily.  Rheumatoid arthritis, unspecified (CMS/[facility])  Comments:  Plaquenil.  Gets his eyes checked twice a year  Orders:  -     losartan (Cozaar) 50 mg tablet; Take 1.5 tablets (75 mg) by mouth once daily.  Gastro-esophageal reflux disease without esophagitis  Comments:  on PPI. cinically asymptomatic  Orders:  -     pantoprazole (ProtoNix) 20 mg EC tablet; Take 1 tablet (20 mg) by mouth once daily. Do not crush, chew, or split.  Other orders  -     Follow Up In Primary Care - Established  -     Follow Up In Primary Care - Medicare Annual; Future

## 2024-02-21 ENCOUNTER — OFFICE VISIT (OUTPATIENT)
Dept: NEUROLOGY | Facility: CLINIC | Age: 69
End: 2024-02-21
Payer: MEDICARE

## 2024-02-21 VITALS
HEART RATE: 67 BPM | WEIGHT: 209 LBS | DIASTOLIC BLOOD PRESSURE: 60 MMHG | HEIGHT: 69 IN | BODY MASS INDEX: 30.96 KG/M2 | SYSTOLIC BLOOD PRESSURE: 112 MMHG

## 2024-02-21 DIAGNOSIS — R53.1 LEFT-SIDED WEAKNESS: ICD-10-CM

## 2024-02-21 DIAGNOSIS — M54.32 SCIATICA OF LEFT SIDE: ICD-10-CM

## 2024-02-21 DIAGNOSIS — G45.9 TIA (TRANSIENT ISCHEMIC ATTACK): Primary | ICD-10-CM

## 2024-02-21 PROCEDURE — 3044F HG A1C LEVEL LT 7.0%: CPT | Performed by: PSYCHIATRY & NEUROLOGY

## 2024-02-21 PROCEDURE — 4010F ACE/ARB THERAPY RXD/TAKEN: CPT | Performed by: PSYCHIATRY & NEUROLOGY

## 2024-02-21 PROCEDURE — 1159F MED LIST DOCD IN RCRD: CPT | Performed by: PSYCHIATRY & NEUROLOGY

## 2024-02-21 PROCEDURE — 1157F ADVNC CARE PLAN IN RCRD: CPT | Performed by: PSYCHIATRY & NEUROLOGY

## 2024-02-21 PROCEDURE — 1160F RVW MEDS BY RX/DR IN RCRD: CPT | Performed by: PSYCHIATRY & NEUROLOGY

## 2024-02-21 PROCEDURE — 1036F TOBACCO NON-USER: CPT | Performed by: PSYCHIATRY & NEUROLOGY

## 2024-02-21 PROCEDURE — 99204 OFFICE O/P NEW MOD 45 MIN: CPT | Performed by: PSYCHIATRY & NEUROLOGY

## 2024-02-21 PROCEDURE — 1126F AMNT PAIN NOTED NONE PRSNT: CPT | Performed by: PSYCHIATRY & NEUROLOGY

## 2024-02-21 PROCEDURE — 3074F SYST BP LT 130 MM HG: CPT | Performed by: PSYCHIATRY & NEUROLOGY

## 2024-02-21 PROCEDURE — 3078F DIAST BP <80 MM HG: CPT | Performed by: PSYCHIATRY & NEUROLOGY

## 2024-02-21 NOTE — PATIENT INSTRUCTIONS
You had left sided weakness and pain in January, possibly related to your left sided sciatica, gout, and TIA.  I do not think you had a  Stroke- your diagnostic workup was negative.  I recommend seeing your Pain Management doctor next week for your nerve block, and  That should help with your walking.  Continue taking the Eliquis to prevent a stroke.  I understand you cannot tolerate  A statin.  Stay as active as possible, follow up is left open ended.

## 2024-02-21 NOTE — PROGRESS NOTES
Subjective   Eugenio Bliss is a 68 y.o.   male.  Weakness, Gen      This is a 68 year old man referred by Alison Lundberg CNP, for concern about a possible stroke in January causing left leg weakness.  He was hospitalized from January 5-9, 2024, at [facility]- he was awoken in the morning with left sided weakness.  His symptoms have gradually improved, along with the pain, and strength.  His stroke work up was negative: MRI of the brain, CT angio of the head and neck, and echocardiogram.  He is on Eliquis for chronic AF.  He also has a history of gout, chronic tardive dyskinesia, chronic left lumbar radiculopathy, and peripheral deficiency.  Objective   Neurological Exam  Mental Status  Awake. Oriented only to person, place, time and situation. Speech is normal. Language is fluent with no aphasia. Fund of knowledge is appropriate for level of education.    Cranial Nerves  CN II: Visual fields full to confrontation.  CN III, IV, VI: Extraocular movements intact bilaterally.  CN V: Facial sensation is normal.  CN VII: Full and symmetric facial movement.  CN VIII: Hearing is normal.  CN IX, X: Palate elevates symmetrically  CN XI: Shoulder shrug strength is normal.  CN XII: Tongue midline without atrophy or fasciculations.    Motor  Normal muscle bulk throughout. No fasciculations present. Normal muscle tone. The following abnormal movements were seen: Uri-buccal dyskinesias..   Strength is 5/5 in all four extremities except as noted.  Positive left leg raising test- left leg proximally has 3/5 strength.    Sensory  Light touch is normal in upper and lower extremities. Pinprick is normal in upper and lower extremities. Vibration is normal in upper and lower extremities. Proprioception is normal in upper and lower extremities.     Reflexes                                            Right                      Left  Biceps                                 1+                         1+  Triceps                                1+                          1+  Patellar                                1+                         1+  Achilles                                0                         0  Right Plantar: downgoing  Left Plantar: downgoing    Coordination  Right: Finger-to-nose normal.Left: Finger-to-nose normal.    Gait  Casual gait:  Left sided antalgic gait..      Physical Exam  Constitutional:       General: He is awake.   Eyes:      Extraocular Movements: Extraocular movements intact.   Neurological:      Deep Tendon Reflexes:      Reflex Scores:       Tricep reflexes are 1+ on the right side and 1+ on the left side.       Bicep reflexes are 1+ on the right side and 1+ on the left side.       Patellar reflexes are 1+ on the right side and 1+ on the left side.       Achilles reflexes are 0 on the right side and 0 on the left side.  Psychiatric:         Speech: Speech normal.       I personally reviewed laboratory, radiographic, and medical studies which were pertinent for nothing.    Assessment/Plan

## 2024-02-27 ENCOUNTER — OUTSIDE PROCEDURE (OUTPATIENT)
Dept: PAIN MEDICINE | Facility: CLINIC | Age: 69
End: 2024-02-27
Payer: MEDICARE

## 2024-02-27 ENCOUNTER — HOSPITAL ENCOUNTER (OUTPATIENT)
Dept: RADIOLOGY | Facility: CLINIC | Age: 69
Discharge: HOME | End: 2024-02-27
Payer: MEDICARE

## 2024-02-27 DIAGNOSIS — M47.816 SPONDYLOSIS WITHOUT MYELOPATHY OR RADICULOPATHY, LUMBAR REGION: ICD-10-CM

## 2024-02-27 PROCEDURE — 64494 INJ PARAVERT F JNT L/S 2 LEV: CPT | Performed by: ANESTHESIOLOGY

## 2024-02-27 PROCEDURE — 64494 INJ PARAVERT F JNT L/S 2 LEV: CPT | Mod: 50 | Performed by: ANESTHESIOLOGY

## 2024-02-27 PROCEDURE — 64493 INJ PARAVERT F JNT L/S 1 LEV: CPT | Mod: 50 | Performed by: ANESTHESIOLOGY

## 2024-02-27 PROCEDURE — 64493 INJ PARAVERT F JNT L/S 1 LEV: CPT | Performed by: ANESTHESIOLOGY

## 2024-02-27 NOTE — PROGRESS NOTES
OPERATIVE NOTE  Preprocedure diagnosis: Lumbar spondylosis  Postprocedure diagnosis Lumbar spondylosis    Procedure performed: prognostic medial branch nerve blocks of BL L4-5, L5-S1 facets with local anesthetic only   Physician: Titi Carson MD  Anesthesia: Local  Complications: none  Blood loss:  Nil    Clinical note: This is a very pleasant 68 y.o. male retired  with PMH CAD, AF on ELIQUIS, COPD on 3 L NC at bedtime, NAFLD, NIDDM2, lupus, Sjogren, gout, MCI, TD (possibly 2/2 lurasidone) who suffers with LBP here meeting all medical criteria for above-mentioned procedure. Patient's pain stable and persistent from last visit.  Denies allergies to Latex, steroids, local anesthetics, or iodine/contrast. Denies fever, chills, NS, CP, SOB, cough, N/V.    Discussed procedure risks/benefits in detail with patient. Pt meets medical necessity for procedure due to failure of conservative measures. Reviewed procedural risks including bleeding, infection, nerve damage, paralysis. Also reviewed mitigating factors such as screening for infection/blood thinner use, sterile precautions, and image-guidance when applicable. All questions answered. Pt/guardian expressed understanding and choose to proceed      Procedure:    Procedure: BILATERAL L4-5, L5-S1 medial branch nerve blocks  Location:  [facility]  Proceduralist: Titi Carson MD  Image guidance: fluoroscopy confirmed    Patient consented for procedure.  Timeout performed.    Patient brought into the procedure suite and positioned in prone position on procedure table.  Patient's low back was prepped with chlorhexidine and draped in sterile fashion.  Using fluoroscopic guidance, left sacral ala identified in AP view.  Using coaxial approach, 3.5 inch 25-gauge needle advanced until bone contacted.  Then, using ipsilateral oblique view, left L4 pedicle, L5 pedicle identified.  3.5 inch 25-gauge needles advanced in coaxial fashion until bone contacted at  both levels where superior articular process meets transverse process.  Needle positioning confirmed in AP view.  Sacral ala needle also confirmed using 30 degrees caudad.  After negative aspiration, 0.5 cc 0.5% bupivacaine injected at each level.  Needles restyletted and withdrawn.      Procedure was repeated on contralateral side in similar fashion without issue.      Hemostasis achieved.  Band-Aids placed.  Patient tolerated procedure well and without issue.      Patient was discharged home in stable condition.      Titi Carson MD  Anesthesiologist & Interventional Pain Physician   [facility] [place]  O: [phone]  F: [phone]  11:47 AM  02/27/24

## 2024-03-03 ENCOUNTER — HOSPITAL ENCOUNTER (EMERGENCY)
Facility: HOSPITAL | Age: 69
Discharge: HOME | End: 2024-03-03
Attending: EMERGENCY MEDICINE
Payer: MEDICARE

## 2024-03-03 ENCOUNTER — APPOINTMENT (OUTPATIENT)
Dept: CARDIOLOGY | Facility: HOSPITAL | Age: 69
End: 2024-03-03
Payer: MEDICARE

## 2024-03-03 ENCOUNTER — APPOINTMENT (OUTPATIENT)
Dept: RADIOLOGY | Facility: HOSPITAL | Age: 69
End: 2024-03-03
Payer: MEDICARE

## 2024-03-03 VITALS
HEART RATE: 72 BPM | WEIGHT: 216.49 LBS | SYSTOLIC BLOOD PRESSURE: 138 MMHG | BODY MASS INDEX: 34.79 KG/M2 | TEMPERATURE: 98.1 F | HEIGHT: 66 IN | OXYGEN SATURATION: 95 % | RESPIRATION RATE: 20 BRPM | DIASTOLIC BLOOD PRESSURE: 80 MMHG

## 2024-03-03 DIAGNOSIS — R06.02 SHORTNESS OF BREATH: ICD-10-CM

## 2024-03-03 DIAGNOSIS — J44.1 COPD EXACERBATION (MULTI): Primary | ICD-10-CM

## 2024-03-03 DIAGNOSIS — I10 SYSTOLIC HYPERTENSION: ICD-10-CM

## 2024-03-03 LAB
ALBUMIN SERPL-MCNC: 4.4 G/DL (ref 3.5–5)
ALP BLD-CCNC: 64 U/L (ref 35–125)
ALT SERPL-CCNC: 18 U/L (ref 5–40)
ANION GAP SERPL CALC-SCNC: 16 MMOL/L
AST SERPL-CCNC: 21 U/L (ref 5–40)
BASOPHILS # BLD AUTO: 0.04 X10*3/UL (ref 0–0.1)
BASOPHILS NFR BLD AUTO: 0.7 %
BILIRUB SERPL-MCNC: 0.9 MG/DL (ref 0.1–1.2)
BUN SERPL-MCNC: 9 MG/DL (ref 8–25)
CALCIUM SERPL-MCNC: 9.8 MG/DL (ref 8.5–10.4)
CHLORIDE SERPL-SCNC: 101 MMOL/L (ref 97–107)
CO2 SERPL-SCNC: 21 MMOL/L (ref 24–31)
CREAT SERPL-MCNC: 0.8 MG/DL (ref 0.4–1.6)
EGFRCR SERPLBLD CKD-EPI 2021: >90 ML/MIN/1.73M*2
EOSINOPHIL # BLD AUTO: 0.1 X10*3/UL (ref 0–0.7)
EOSINOPHIL NFR BLD AUTO: 1.7 %
ERYTHROCYTE [DISTWIDTH] IN BLOOD BY AUTOMATED COUNT: 14 % (ref 11.5–14.5)
FLUAV RNA RESP QL NAA+PROBE: NOT DETECTED
FLUBV RNA RESP QL NAA+PROBE: NOT DETECTED
GLUCOSE SERPL-MCNC: 110 MG/DL (ref 65–99)
HCT VFR BLD AUTO: 41.1 % (ref 41–52)
HGB BLD-MCNC: 14.1 G/DL (ref 13.5–17.5)
IMM GRANULOCYTES # BLD AUTO: 0.03 X10*3/UL (ref 0–0.7)
IMM GRANULOCYTES NFR BLD AUTO: 0.5 % (ref 0–0.9)
LYMPHOCYTES # BLD AUTO: 0.87 X10*3/UL (ref 1.2–4.8)
LYMPHOCYTES NFR BLD AUTO: 14.8 %
MCH RBC QN AUTO: 30.5 PG (ref 26–34)
MCHC RBC AUTO-ENTMCNC: 34.3 G/DL (ref 32–36)
MCV RBC AUTO: 89 FL (ref 80–100)
MONOCYTES # BLD AUTO: 0.47 X10*3/UL (ref 0.1–1)
MONOCYTES NFR BLD AUTO: 8 %
NEUTROPHILS # BLD AUTO: 4.36 X10*3/UL (ref 1.2–7.7)
NEUTROPHILS NFR BLD AUTO: 74.3 %
NRBC BLD-RTO: 0 /100 WBCS (ref 0–0)
NT-PROBNP SERPL-MCNC: 229 PG/ML (ref 0–229)
PLATELET # BLD AUTO: 105 X10*3/UL (ref 150–450)
POTASSIUM SERPL-SCNC: 3.4 MMOL/L (ref 3.4–5.1)
PROT SERPL-MCNC: 7.4 G/DL (ref 5.9–7.9)
RBC # BLD AUTO: 4.63 X10*6/UL (ref 4.5–5.9)
RBC MORPH BLD: NORMAL
RSV RNA RESP QL NAA+PROBE: NOT DETECTED
SARS-COV-2 RNA RESP QL NAA+PROBE: NOT DETECTED
SODIUM SERPL-SCNC: 138 MMOL/L (ref 133–145)
TROPONIN T SERPL-MCNC: 18 NG/L
TROPONIN T SERPL-MCNC: 22 NG/L
WBC # BLD AUTO: 5.9 X10*3/UL (ref 4.4–11.3)

## 2024-03-03 PROCEDURE — 84484 ASSAY OF TROPONIN QUANT: CPT | Performed by: EMERGENCY MEDICINE

## 2024-03-03 PROCEDURE — 99285 EMERGENCY DEPT VISIT HI MDM: CPT | Mod: 25

## 2024-03-03 PROCEDURE — 83880 ASSAY OF NATRIURETIC PEPTIDE: CPT | Performed by: EMERGENCY MEDICINE

## 2024-03-03 PROCEDURE — 71045 X-RAY EXAM CHEST 1 VIEW: CPT | Performed by: RADIOLOGY

## 2024-03-03 PROCEDURE — 71275 CT ANGIOGRAPHY CHEST: CPT | Performed by: SURGERY

## 2024-03-03 PROCEDURE — 94640 AIRWAY INHALATION TREATMENT: CPT

## 2024-03-03 PROCEDURE — 2500000004 HC RX 250 GENERAL PHARMACY W/ HCPCS (ALT 636 FOR OP/ED)

## 2024-03-03 PROCEDURE — 87637 SARSCOV2&INF A&B&RSV AMP PRB: CPT | Performed by: EMERGENCY MEDICINE

## 2024-03-03 PROCEDURE — 36415 COLL VENOUS BLD VENIPUNCTURE: CPT | Performed by: EMERGENCY MEDICINE

## 2024-03-03 PROCEDURE — 80053 COMPREHEN METABOLIC PANEL: CPT | Performed by: EMERGENCY MEDICINE

## 2024-03-03 PROCEDURE — 2500000001 HC RX 250 WO HCPCS SELF ADMINISTERED DRUGS (ALT 637 FOR MEDICARE OP)

## 2024-03-03 PROCEDURE — 2550000001 HC RX 255 CONTRASTS: Performed by: EMERGENCY MEDICINE

## 2024-03-03 PROCEDURE — 2500000002 HC RX 250 W HCPCS SELF ADMINISTERED DRUGS (ALT 637 FOR MEDICARE OP, ALT 636 FOR OP/ED)

## 2024-03-03 PROCEDURE — 71275 CT ANGIOGRAPHY CHEST: CPT

## 2024-03-03 PROCEDURE — 71045 X-RAY EXAM CHEST 1 VIEW: CPT

## 2024-03-03 PROCEDURE — 96374 THER/PROPH/DIAG INJ IV PUSH: CPT | Mod: 59

## 2024-03-03 PROCEDURE — 93005 ELECTROCARDIOGRAM TRACING: CPT

## 2024-03-03 PROCEDURE — 85025 COMPLETE CBC W/AUTO DIFF WBC: CPT | Performed by: EMERGENCY MEDICINE

## 2024-03-03 RX ORDER — METHYLPREDNISOLONE 4 MG/1
TABLET ORAL
Qty: 21 TABLET | Refills: 0 | Status: SHIPPED | OUTPATIENT
Start: 2024-03-03 | End: 2024-03-10

## 2024-03-03 RX ORDER — ALBUTEROL SULFATE 90 UG/1
2 AEROSOL, METERED RESPIRATORY (INHALATION) EVERY 4 HOURS PRN
Qty: 18 G | Refills: 0 | Status: SHIPPED | OUTPATIENT
Start: 2024-03-03 | End: 2024-03-05 | Stop reason: SDUPTHER

## 2024-03-03 RX ORDER — IPRATROPIUM BROMIDE AND ALBUTEROL SULFATE 2.5; .5 MG/3ML; MG/3ML
3 SOLUTION RESPIRATORY (INHALATION) ONCE
Status: COMPLETED | OUTPATIENT
Start: 2024-03-03 | End: 2024-03-03

## 2024-03-03 RX ORDER — NAPROXEN SODIUM 220 MG/1
324 TABLET, FILM COATED ORAL ONCE
Status: COMPLETED | OUTPATIENT
Start: 2024-03-03 | End: 2024-03-03

## 2024-03-03 RX ORDER — IPRATROPIUM BROMIDE AND ALBUTEROL SULFATE 2.5; .5 MG/3ML; MG/3ML
3 SOLUTION RESPIRATORY (INHALATION) ONCE
Status: DISCONTINUED | OUTPATIENT
Start: 2024-03-03 | End: 2024-03-03 | Stop reason: HOSPADM

## 2024-03-03 RX ORDER — GUAIFENESIN 600 MG/1
600 TABLET, EXTENDED RELEASE ORAL 2 TIMES DAILY
Qty: 10 TABLET | Refills: 0 | Status: SHIPPED | OUTPATIENT
Start: 2024-03-03 | End: 2024-03-08

## 2024-03-03 RX ADMIN — IOHEXOL 75 ML: 350 INJECTION, SOLUTION INTRAVENOUS at 18:46

## 2024-03-03 RX ADMIN — METHYLPREDNISOLONE SODIUM SUCCINATE 125 MG: 125 INJECTION, POWDER, FOR SOLUTION INTRAMUSCULAR; INTRAVENOUS at 15:23

## 2024-03-03 RX ADMIN — IPRATROPIUM BROMIDE AND ALBUTEROL SULFATE 3 ML: 2.5; .5 SOLUTION RESPIRATORY (INHALATION) at 15:23

## 2024-03-03 RX ADMIN — ASPIRIN 324 MG: 81 TABLET, CHEWABLE ORAL at 15:23

## 2024-03-03 ASSESSMENT — PAIN - FUNCTIONAL ASSESSMENT: PAIN_FUNCTIONAL_ASSESSMENT: 0-10

## 2024-03-03 ASSESSMENT — PAIN SCALES - GENERAL: PAINLEVEL_OUTOF10: 5 - MODERATE PAIN

## 2024-03-03 NOTE — PROGRESS NOTES
Attestation/Supervisory note for MENDY Chiang      The patient is a 68-year-old male presenting to the emergency department for evaluation of increasing shortness of breath and dyspnea on exertion.  He states his symptoms started this morning and have been gradually worsening.  No specific better or worse.   It is constant.  He states he feels lightheaded and feels like he may pass out.  He denies any headache or visual changes.  No chest pain.  No palpitations.  No diaphoresis.  No abdominal pain.  No nausea or vomiting.  No diarrhea or constipation but no urinary complaints.  No cough or congestion.  No sick contacts or recent travel.  No history of PE or DVT.  No history of CAD or ACS.  He states he does not smoke.  He states he does have a history of COPD but he only uses supplemental oxygen, 2 L by nasal cannula, at night.  All pertinent positives and negatives are recorded above.  All other systems reviewed and otherwise negative.  Vital signs with hypertension but otherwise within normal limits.  Physical exam with a well-nourished well-developed male in no acute distress.  HEENT exam within normal limits.  He has no evidence of airway compromise or respiratory distress.  Abdominal exam is benign.  He has no gross motor, neurologic or vascular deficits on exam.      EKG with sinus rhythm at 62 bpm, leftward axis, normal voltage, normal ST segment, normal T waves      Oral aspirin, DuoNebs and IV Solu-Medrol ordered.      Diagnostic labs with thrombocytopenia, borderline Troponin T, and mild electrolyte imbalance but otherwise unremarkable.      Initial Troponin T 18. Repeat trop T 22      COVID-19 testing negative      CT angio chest for pulmonary embolism   Final Result   No evidence of acute pulmonary embolism to segmental level. Please   note that, assessment of subsegmental branches is limited and small   peripheral emboli are not entirely excluded.        No definite evidence of acute pathology in the  chest.Respiratory   motion artifact slightly limits evaluation of the lung parenchyma.   Streaky and platelike opacities in the lower lungs are compatible   with atelectasis or scar. No focal consolidation, pleural effusion,   or pneumothorax.        Cardiomegaly.        Atherosclerosis, including coronary artery disease.        Dilated main pulmonary artery, suggesting pulmonary arterial   hypertension.        Additional findings as discussed above.        MACRO:   None        Signed by: Lázaro Middleton 3/3/2024 7:31 PM   Dictation workstation:   [id number]      XR chest 1 view   Final Result   1. No acute cardiopulmonary process.        MACRO:   None.        Signed by: Mena Fernandes 3/3/2024 3:32 PM   Dictation workstation:   [id number]           The patient does not have any evidence of ischemia on EKG.  Cardiac enzymes were borderline but flat.  No events on telemetry.  The patient did have improvement in his shortness of breath with medications given in the emergency department.  COVID-19 testing was negative.  The patient does not have any acute process on chest x-ray.  No evidence of pneumonia or pneumothorax.  No evidence of CHF.  The patient does not have any evidence of PE or dissection on CT angio chest.  No evidence of acute process such as pneumonia or pneumothorax.      Trial of ambulation with pulse ox of 94% at all times on room air.      The patient was released in good condition.  He was instructed to follow-up with his primary care physician within 1 to 2 days for further management of excoriation current symptoms.  He will return to the emergency department sooner with worsening of symptoms or onset of new symptoms.  Rx given for albuterol, Mucinex and Medrol Dosepak.        Impression/diagnosis  COPD exacerbation, acute  Dyspnea, dyspnea on exertion  Near syncope  Hypertension, unspecified  Thrombocytopenia        Critical care time of 31 minutes billed for management of COPD exacerbation with  initiation of DuoNebs and IV Solu-Medrol, initiation of oral aspirin, monitoring the patient to telemetry, consultation with the patient regarding his results..  This time excludes time for billable procedures.      critical care time billed for by me is non concurrent with time billed for by MENDY Chiang      I personally saw the patient and made/approve the management plan and take responsibility for the patient management.      I independently interpreted the following study (S): EKG and diagnostic labs      I personally discussed the patient's management with the patient      I reviewed the results of the diagnostic labs and diagnostic imaging.  Formal radiology read was completed by the radiologist.      Cathy Cain MD
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
## 2024-03-04 NOTE — DISCHARGE INSTRUCTIONS
Follow up with your primary care physician within 1 to 2 days for further management of your current symptoms      Return to the emergency department sooner with worsening of symptoms or onset of new symptoms

## 2024-03-04 NOTE — ED NOTES
Report received. Pt awake and alert with family at bedside. Pt was told he could eat after his Ct resulted, however pt thought that meant once the scan was done so pt was eating [employer] upon entering the room.      Tomasa Albert RN  03/03/24 [id number]

## 2024-03-05 ENCOUNTER — OFFICE VISIT (OUTPATIENT)
Dept: PRIMARY CARE | Facility: CLINIC | Age: 69
End: 2024-03-05
Payer: MEDICARE

## 2024-03-05 VITALS
DIASTOLIC BLOOD PRESSURE: 77 MMHG | OXYGEN SATURATION: 97 % | SYSTOLIC BLOOD PRESSURE: 130 MMHG | WEIGHT: 207 LBS | HEIGHT: 66 IN | HEART RATE: 67 BPM | BODY MASS INDEX: 33.27 KG/M2

## 2024-03-05 DIAGNOSIS — R06.02 SHORTNESS OF BREATH: ICD-10-CM

## 2024-03-05 DIAGNOSIS — J44.1 COPD EXACERBATION (MULTI): ICD-10-CM

## 2024-03-05 PROCEDURE — 4010F ACE/ARB THERAPY RXD/TAKEN: CPT | Performed by: NURSE PRACTITIONER

## 2024-03-05 PROCEDURE — 99214 OFFICE O/P EST MOD 30 MIN: CPT | Performed by: NURSE PRACTITIONER

## 2024-03-05 PROCEDURE — 1159F MED LIST DOCD IN RCRD: CPT | Performed by: NURSE PRACTITIONER

## 2024-03-05 PROCEDURE — 3078F DIAST BP <80 MM HG: CPT | Performed by: NURSE PRACTITIONER

## 2024-03-05 PROCEDURE — 1157F ADVNC CARE PLAN IN RCRD: CPT | Performed by: NURSE PRACTITIONER

## 2024-03-05 PROCEDURE — 3075F SYST BP GE 130 - 139MM HG: CPT | Performed by: NURSE PRACTITIONER

## 2024-03-05 PROCEDURE — 1160F RVW MEDS BY RX/DR IN RCRD: CPT | Performed by: NURSE PRACTITIONER

## 2024-03-05 PROCEDURE — 3044F HG A1C LEVEL LT 7.0%: CPT | Performed by: NURSE PRACTITIONER

## 2024-03-05 PROCEDURE — 1126F AMNT PAIN NOTED NONE PRSNT: CPT | Performed by: NURSE PRACTITIONER

## 2024-03-05 PROCEDURE — 1036F TOBACCO NON-USER: CPT | Performed by: NURSE PRACTITIONER

## 2024-03-05 RX ORDER — ALBUTEROL SULFATE 90 UG/1
2 AEROSOL, METERED RESPIRATORY (INHALATION) EVERY 4 HOURS PRN
Qty: 18 G | Refills: 5 | Status: SHIPPED | OUTPATIENT
Start: 2024-03-05 | End: 2024-04-04

## 2024-03-05 NOTE — PROGRESS NOTES
"Subjective   Patient ID: Eugenio Bliss is a 68 y.o. male who presents for Follow-up (Patient mentioned this past Sunday 03/03/2024 he went to the ER due to having trouble breathing. Patient was given Mucinex, Albuterol, and a medrol dose pack ).    68-year-old male accompanied by son here for ED follow-up.  He went to the emergency room 3 days ago for \"COPD exacerbation\".  I reviewed the emergency room notes.  He had no upper respiratory symptoms other than chest tightness and difficulty taking a deep breath.  He was treated in the ED with nebulizer and steroids.  Cardiac workup negative.  Son states and showed me a drawing of the patient's breathing and heart pattern from the emergency room and he is wondering if maybe the symptoms that his dad is experiencing is related to his heart.  Transthoracic echo from January 2024 reviewed with patient and son EF is 60 to 65%.  He has no edema no shortness of breath no cough.  Overall he is doing and feeling very well at his baseline.  He has a diagnosis of COPD he uses 2 L nasal cannula at at bedtime.  He has albuterol nebulizer to be used as needed at home with rare use.  He was given a handheld albuterol inhaler by the emergency room he has not required that since yesterday         Review of Systems    Objective   /77   Pulse 67   Ht 1.676 m (5' 6\")   Wt 93.9 kg (207 lb)   SpO2 97%   BMI 33.41 kg/m²     Physical Exam  Constitutional:       Appearance: He is obese.   Cardiovascular:      Rate and Rhythm: Normal rate and regular rhythm.   Pulmonary:      Effort: Pulmonary effort is normal.      Breath sounds: Normal breath sounds.   Abdominal:      General: Bowel sounds are normal.      Palpations: Abdomen is soft.   Musculoskeletal:         General: Normal range of motion.   Neurological:      Mental Status: He is alert and oriented to person, place, and time.      Comments: Extraparametal symptoms generalized.  Tongue rolling abnormal spontaneous facial " movements, , bilateral hands rubbing the legs and pinching at closing, spontaneous movement bilateral lower extremities   Psychiatric:         Mood and Affect: Mood normal.         Behavior: Behavior normal.         Assessment/Plan   Diagnoses and all orders for this visit:  COPD exacerbation (CMS/[facility])  Comments:  Monitor for now could have been related to change in weather.  All symptoms completely resolved.  Remains on oral steroids  Orders:  -     albuterol 90 mcg/actuation inhaler; Inhale 2 puffs every 4 hours if needed for wheezing.  Shortness of breath  Comments:  Completely resolved  Orders:  -     albuterol 90 mcg/actuation inhaler; Inhale 2 puffs every 4 hours if needed for wheezing.    At this time I do not think is cardiac in nature.  I think do not want to start him on inhaled steroids given his autoimmune diseases.  If he has frequent exacerbations we can readdress this.  He has chronic dry mouth secondary to Sjogren's disease steroids are going to make that worse.    Albuterol inhaler should be kept on hand not just the nebulizer.    All of this was discussed with patient and son

## 2024-03-11 ENCOUNTER — OFFICE VISIT (OUTPATIENT)
Dept: PAIN MEDICINE | Facility: CLINIC | Age: 69
End: 2024-03-11
Payer: MEDICARE

## 2024-03-11 VITALS
HEIGHT: 69 IN | DIASTOLIC BLOOD PRESSURE: 68 MMHG | SYSTOLIC BLOOD PRESSURE: 112 MMHG | HEART RATE: 83 BPM | BODY MASS INDEX: 30.66 KG/M2 | OXYGEN SATURATION: 96 % | WEIGHT: 207 LBS

## 2024-03-11 DIAGNOSIS — M47.816 LUMBAR SPONDYLOSIS: Primary | ICD-10-CM

## 2024-03-11 DIAGNOSIS — M48.062 SPINAL STENOSIS OF LUMBAR REGION WITH NEUROGENIC CLAUDICATION: ICD-10-CM

## 2024-03-11 LAB
ATRIAL RATE: 62 BPM
P AXIS: 29 DEGREES
P OFFSET: 190 MS
P ONSET: 127 MS
PR INTERVAL: 168 MS
Q ONSET: 211 MS
QRS COUNT: 11 BEATS
QRS DURATION: 106 MS
QT INTERVAL: 420 MS
QTC CALCULATION(BAZETT): 426 MS
QTC FREDERICIA: 424 MS
R AXIS: -53 DEGREES
T AXIS: 63 DEGREES
T OFFSET: 421 MS
VENTRICULAR RATE: 62 BPM

## 2024-03-11 PROCEDURE — 1125F AMNT PAIN NOTED PAIN PRSNT: CPT | Performed by: ANESTHESIOLOGY

## 2024-03-11 PROCEDURE — 4010F ACE/ARB THERAPY RXD/TAKEN: CPT | Performed by: ANESTHESIOLOGY

## 2024-03-11 PROCEDURE — 99213 OFFICE O/P EST LOW 20 MIN: CPT | Performed by: ANESTHESIOLOGY

## 2024-03-11 PROCEDURE — 1157F ADVNC CARE PLAN IN RCRD: CPT | Performed by: ANESTHESIOLOGY

## 2024-03-11 PROCEDURE — 3078F DIAST BP <80 MM HG: CPT | Performed by: ANESTHESIOLOGY

## 2024-03-11 PROCEDURE — 1036F TOBACCO NON-USER: CPT | Performed by: ANESTHESIOLOGY

## 2024-03-11 PROCEDURE — 1160F RVW MEDS BY RX/DR IN RCRD: CPT | Performed by: ANESTHESIOLOGY

## 2024-03-11 PROCEDURE — 3074F SYST BP LT 130 MM HG: CPT | Performed by: ANESTHESIOLOGY

## 2024-03-11 PROCEDURE — 1159F MED LIST DOCD IN RCRD: CPT | Performed by: ANESTHESIOLOGY

## 2024-03-11 PROCEDURE — 3044F HG A1C LEVEL LT 7.0%: CPT | Performed by: ANESTHESIOLOGY

## 2024-03-11 ASSESSMENT — ENCOUNTER SYMPTOMS
CONSTITUTIONAL NEGATIVE: 1
PSYCHIATRIC NEGATIVE: 1
HEMATOLOGIC/LYMPHATIC NEGATIVE: 1
WEAKNESS: 1
GASTROINTESTINAL NEGATIVE: 1
CARDIOVASCULAR NEGATIVE: 1
BACK PAIN: 1
EYES NEGATIVE: 1
ENDOCRINE NEGATIVE: 1
RESPIRATORY NEGATIVE: 1
NUMBNESS: 1

## 2024-03-11 ASSESSMENT — PATIENT HEALTH QUESTIONNAIRE - PHQ9
SUM OF ALL RESPONSES TO PHQ9 QUESTIONS 1 AND 2: 0
2. FEELING DOWN, DEPRESSED OR HOPELESS: NOT AT ALL
1. LITTLE INTEREST OR PLEASURE IN DOING THINGS: NOT AT ALL

## 2024-03-11 ASSESSMENT — PAIN SCALES - GENERAL
PAINLEVEL: 4
PAINLEVEL_OUTOF10: 4

## 2024-03-11 ASSESSMENT — PAIN DESCRIPTION - DESCRIPTORS: DESCRIPTORS: DULL

## 2024-03-11 ASSESSMENT — PAIN - FUNCTIONAL ASSESSMENT: PAIN_FUNCTIONAL_ASSESSMENT: 0-10
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
## 2024-04-02 ENCOUNTER — APPOINTMENT (OUTPATIENT)
Dept: RADIOLOGY | Facility: CLINIC | Age: 69
End: 2024-04-02
Payer: MEDICARE

## 2024-04-09 ENCOUNTER — PATIENT OUTREACH (OUTPATIENT)
Dept: PRIMARY CARE | Facility: CLINIC | Age: 69
End: 2024-04-09
Payer: MEDICARE

## 2024-04-09 NOTE — PROGRESS NOTES
Outreach made to wrap up Transitional Care Management (TCM) program. At the time of call, the patient states he is doing very well. No questions or concerns for primary care provider. The patient has met target of no readmission for (90) days post hospital discharge and is graduated from the TCM program at this time.

## 2024-04-11 ENCOUNTER — TELEPHONE (OUTPATIENT)
Dept: PAIN MEDICINE | Facility: CLINIC | Age: 69
End: 2024-04-11
Payer: MEDICARE

## 2024-04-16 ENCOUNTER — OFFICE VISIT (OUTPATIENT)
Dept: OTOLARYNGOLOGY | Facility: CLINIC | Age: 69
End: 2024-04-16
Payer: MEDICARE

## 2024-04-16 DIAGNOSIS — H91.93 DECREASED HEARING OF BOTH EARS: Primary | ICD-10-CM

## 2024-04-16 PROCEDURE — 3044F HG A1C LEVEL LT 7.0%: CPT | Performed by: OTOLARYNGOLOGY

## 2024-04-16 PROCEDURE — 1036F TOBACCO NON-USER: CPT | Performed by: OTOLARYNGOLOGY

## 2024-04-16 PROCEDURE — 1159F MED LIST DOCD IN RCRD: CPT | Performed by: OTOLARYNGOLOGY

## 2024-04-16 PROCEDURE — 4010F ACE/ARB THERAPY RXD/TAKEN: CPT | Performed by: OTOLARYNGOLOGY

## 2024-04-16 PROCEDURE — 99203 OFFICE O/P NEW LOW 30 MIN: CPT | Performed by: OTOLARYNGOLOGY

## 2024-04-16 PROCEDURE — 1157F ADVNC CARE PLAN IN RCRD: CPT | Performed by: OTOLARYNGOLOGY

## 2024-04-16 PROCEDURE — 1160F RVW MEDS BY RX/DR IN RCRD: CPT | Performed by: OTOLARYNGOLOGY

## 2024-04-16 ASSESSMENT — ENCOUNTER SYMPTOMS
OCCASIONAL FEELINGS OF UNSTEADINESS: 1
LOSS OF SENSATION IN FEET: 1
DEPRESSION: 1

## 2024-04-16 NOTE — PROGRESS NOTES
"History Of Present Illness  Eugenio Bliss is a 68 y.o. male presenting with: \"Ears, hearing\".  He is kindly referred by Alison Lundberg APRN-CNP.    He has decreased hearing.   History of exposure to loud sounds..    TMs look intact bilaterally.    Plan:  1- hearing test, follow up after the test     Past Medical History  He has a past medical history of Abdominal hernia (05/16/2023), Achilles tendinitis, right leg, Acute frontal sinusitis, unspecified (02/03/2020), Allergic rhinitis (05/16/2023), Body mass index (BMI) 39.0-39.9, adult (05/25/2021), CHF (congestive heart failure) (Multi), Chondrocostal junction syndrome (tietze) (07/22/2013), Contact with and (suspected) exposure to covid-19, COPD (chronic obstructive pulmonary disease) (Multi), Deficiency of other specified B group vitamins (12/03/2019), Diabetes mellitus (Multi), Dry eye syndrome of unspecified lacrimal gland (12/29/2014), Effusion, right ankle (06/13/2018), Encounter for screening for malignant neoplasm of colon (05/19/2016), Encounter for screening for malignant neoplasm of prostate (04/24/2019), Gallbladder attack (05/16/2023), Hypertension (05/21/2023), Inguinal hernia (05/21/2023), Obesity, unspecified (05/04/2022), Other conditions influencing health status (04/22/2013), Other conditions influencing health status (08/14/2019), Other conditions influencing health status (04/22/2013), Pain in right ankle and joints of right foot, Pain in unspecified elbow (07/10/2017), Personal history of diseases of the blood and blood-forming organs and certain disorders involving the immune mechanism (12/03/2019), Personal history of diseases of the blood and blood-forming organs and certain disorders involving the immune mechanism (12/03/2019), Personal history of other (healed) physical injury and trauma (04/16/2019), Personal history of other diseases of the digestive system (12/03/2019), Personal history of other diseases of the digestive system " (07/08/2022), Personal history of other diseases of the respiratory system, Personal history of other diseases of urinary system (12/03/2019), Personal history of other endocrine, nutritional and metabolic disease (04/22/2013), Personal history of other endocrine, nutritional and metabolic disease (07/29/2016), Personal history of other specified conditions (10/17/2019), Personal history of other specified conditions, Personal history of other specified conditions (09/06/2013), Personal history of other specified conditions (04/22/2013), Personal history of pneumonia (recurrent) (09/04/2020), Pleurodynia (06/05/2020), Pneumonia of left lung due to infectious organism (05/21/2023), Pure hypercholesterolemia, unspecified (11/08/2013), Right knee pain (05/21/2023), Sjogren syndrome, unspecified (Multi), Snoring (05/21/2023), Stiffness of right ankle, not elsewhere classified (06/13/2018), 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    Surgical History  He has a past surgical history that includes Ankle surgery (04/17/2013); Knee surgery (04/17/2013); and Hernia repair (04/17/2013).     Social History  He reports that he has never smoked. He has never used smokeless tobacco. He reports that he does not currently use alcohol. He reports that he does not use drugs.    Family History  No family history on file.     Allergies  Ciprofloxacin, Levofloxacin, and Penicillins    Review of Systems   Recent weight loss  Difficulty hearing  Loss of hearing  Ears feel full  Snoring  Dry mouth/mouth breathing  Leg pain  Shortness of breath  Shortness of breath on exertion  Joint pain  Muscle pain  Joint swelling  Joint stiffness  Limb pain  Limb swelling  Dry skin  Migraines  Restless leg  Weakness  Sleep disturbance  Anxiety  Depression  Increased thirst  Muscle weakness  Deepening of voice  Easy bruising     Physical  "Exam    General appearance: Healthy-appearing, well-nourished, well groomed, in no acute distress.     Head and Face: Atraumatic with no masses, lesions, or scarring.      Salivary glands: No tenderness of the parotid glands or parotid masses.     No tenderness of the submandibular glands or submandibular masses.      Facial strength: Normal strength and symmetry, no synkinesis or facial tic.     Eyes: Conjunctivas look non-hyperemic bilaterally    Ears: Bilaterally ear canals look normal. Tympanic membranes look intact, no hyperemia, fluid or retraction. Hearing grossly normal.      Nose: Mucosa looks normal. No purulent discharge.      Oral Cavity/Mouth: Lips and tongue look normal.     Throat: No postnasal discharge. No tonsil hypertrophy. No hyperemia.    Neck: Symmetrical, trachea midline.     Pulmonary: Normal respiratory effort.     Lymphatic: No palpable pathologic lymph nodes at neck.     Neurological/Psychiatric Orientation to person, place, and time: Normal.     Mood and affect: Normal.      Extremities: No clubbing.     Skin: No significant skin lesions were noted at face or neck        Procedure         Last Recorded Vitals  There were no vitals taken for this visit.    Relevant Results    Assessment and Plan:  Eugenio Bliss is a 68 y.o. male presenting with: \"Ears, hearing\".  He is kindly referred by Alison Lundberg, APRN-CNP.    He has decreased hearing.   History of exposure to loud sounds..    TMs look intact bilaterally.    Plan:  1- hearing test, follow up after the test      Ness Paul  Otolaryngology - Head & Neck Surgery  "

## 2024-04-22 NOTE — TELEPHONE ENCOUNTER
Called patient to reschedule his procedure. He states that he cancelled because he didn't hold his blood thinner. I informed the patient for this procedure he is not required to hold his blood thinner.

## 2024-04-23 ENCOUNTER — APPOINTMENT (OUTPATIENT)
Dept: PAIN MEDICINE | Facility: CLINIC | Age: 69
End: 2024-04-23
Payer: MEDICARE

## 2024-05-07 ENCOUNTER — ANCILLARY PROCEDURE (OUTPATIENT)
Dept: RADIOLOGY | Facility: EXTERNAL LOCATION | Age: 69
End: 2024-05-07
Payer: MEDICARE

## 2024-05-07 DIAGNOSIS — M47.816 SPONDYLOSIS WITHOUT MYELOPATHY OR RADICULOPATHY, LUMBAR REGION: ICD-10-CM

## 2024-05-07 PROCEDURE — 64494 INJ PARAVERT F JNT L/S 2 LEV: CPT | Performed by: ANESTHESIOLOGY

## 2024-05-07 PROCEDURE — 64493 INJ PARAVERT F JNT L/S 1 LEV: CPT | Performed by: ANESTHESIOLOGY

## 2024-05-07 NOTE — PROGRESS NOTES
OPERATIVE NOTE  Preprocedure diagnosis: Lumbar spondylosis  Postprocedure diagnosis Lumbar spondylosis    Procedure performed: prognostic medial branch nerve blocks of BL L4-5, L5-S1 facets with local anesthetic only   Physician: Titi Carson MD  Anesthesia: Local  Complications: none  Blood loss:  Nil    Clinical note: This is a very pleasant  68 y.o. old male with PMH CAD, AF on ELIQUIS, COPD on 3 L NC at bedtime, NAFLD, NIDDM2, lupus, Sjogren, gout, MCI, schizophrenia on lurasidone c/b TD who suffers with LBP here meeting all medical criteria for above-mentioned procedure. Son assisted with H&P and consent. Patient's pain stable and persistent from last visit.  No personal/family hx issues with anesthesia. Denies allergies to Latex, steroids, local anesthetics, or iodine/contrast. Denies fever, chills, NS, CP, SOB, cough, N/V.    Discussed procedure risks/benefits in detail with patient. Pt meets medical necessity for procedure due to failure of conservative measures. Reviewed procedural risks including bleeding, infection, nerve damage, paralysis. Also reviewed mitigating factors such as screening for infection/blood thinner use, sterile precautions, and image-guidance when applicable. All questions answered. Pt/guardian expressed understanding and choose to proceed      Procedure:    Procedure: BILATERAL L4-5, L5-S1 medial branch nerve blocks  Location:  [facility]  Proceduralist: Titi Carson MD  Image guidance: fluoroscopy confirmed    Patient consented for procedure.  Timeout performed.    Patient brought into the procedure suite and positioned in prone position on procedure table.  Patient's low back was prepped with chlorhexidine and draped in sterile fashion.  Using fluoroscopic guidance, left sacral ala identified in AP view.  Using coaxial approach, 3.5 inch 25-gauge needle advanced until bone contacted.  Then, using ipsilateral oblique view, left L4 pedicle, L5 pedicle identified.  3.5 inch  25-gauge needles advanced in coaxial fashion until bone contacted at both levels where superior articular process meets transverse process.  Needle positioning confirmed in AP view.  Sacral ala needle also confirmed using 30 degrees caudad.  After negative aspiration, 0.5 cc 0.5% bupivacaine injected at each level.  Needles restyletted and withdrawn.      Procedure was repeated on contralateral side in similar fashion without issue.      Hemostasis achieved.  Band-Aids placed.  Patient tolerated procedure well and without issue.      Patient was discharged home in stable condition.      Titi Carson MD  Anesthesiologist & Interventional Pain Physician   [facility] [place]  O: [phone]  F: [phone]  10:14 AM  05/07/24

## 2024-05-15 ENCOUNTER — CLINICAL SUPPORT (OUTPATIENT)
Dept: AUDIOLOGY | Facility: CLINIC | Age: 69
End: 2024-05-15
Payer: MEDICARE

## 2024-05-15 ENCOUNTER — OFFICE VISIT (OUTPATIENT)
Dept: OTOLARYNGOLOGY | Facility: CLINIC | Age: 69
End: 2024-05-15
Payer: MEDICARE

## 2024-05-15 DIAGNOSIS — H90.3 SENSORINEURAL HEARING LOSS (SNHL) OF BOTH EARS: Primary | ICD-10-CM

## 2024-05-15 DIAGNOSIS — H90.3 ASYMMETRICAL SENSORINEURAL HEARING LOSS: ICD-10-CM

## 2024-05-15 PROCEDURE — 4010F ACE/ARB THERAPY RXD/TAKEN: CPT | Performed by: AUDIOLOGIST

## 2024-05-15 PROCEDURE — 3044F HG A1C LEVEL LT 7.0%: CPT | Performed by: OTOLARYNGOLOGY

## 2024-05-15 PROCEDURE — 1036F TOBACCO NON-USER: CPT | Performed by: AUDIOLOGIST

## 2024-05-15 PROCEDURE — 1159F MED LIST DOCD IN RCRD: CPT | Performed by: OTOLARYNGOLOGY

## 2024-05-15 PROCEDURE — 99212 OFFICE O/P EST SF 10 MIN: CPT | Performed by: OTOLARYNGOLOGY

## 2024-05-15 PROCEDURE — 1159F MED LIST DOCD IN RCRD: CPT | Performed by: AUDIOLOGIST

## 2024-05-15 PROCEDURE — 4010F ACE/ARB THERAPY RXD/TAKEN: CPT | Performed by: OTOLARYNGOLOGY

## 2024-05-15 PROCEDURE — 92550 TYMPANOMETRY & REFLEX THRESH: CPT | Performed by: AUDIOLOGIST

## 2024-05-15 PROCEDURE — 3044F HG A1C LEVEL LT 7.0%: CPT | Performed by: AUDIOLOGIST

## 2024-05-15 PROCEDURE — 1036F TOBACCO NON-USER: CPT | Performed by: OTOLARYNGOLOGY

## 2024-05-15 PROCEDURE — 1160F RVW MEDS BY RX/DR IN RCRD: CPT | Performed by: AUDIOLOGIST

## 2024-05-15 PROCEDURE — 1157F ADVNC CARE PLAN IN RCRD: CPT | Performed by: OTOLARYNGOLOGY

## 2024-05-15 PROCEDURE — 92557 COMPREHENSIVE HEARING TEST: CPT | Performed by: AUDIOLOGIST

## 2024-05-15 PROCEDURE — 1157F ADVNC CARE PLAN IN RCRD: CPT | Performed by: AUDIOLOGIST

## 2024-05-15 PROCEDURE — 1160F RVW MEDS BY RX/DR IN RCRD: CPT | Performed by: OTOLARYNGOLOGY

## 2024-05-15 NOTE — PROGRESS NOTES
AUDIOLOGY ADULT AUDIOMETRIC EVALUATION    Name:  Eugenio Bliss  :  1955  Age:  68 y.o.  Date of Evaluation:  May 15, 2024    Reason for visit: Mr. Bliss is seen in the clinic today at the request of otolaryngology for an audiologic evaluation.     HISTORY  Patient complains of some hearing loss but his spouse feels he is having difficulty hearing.  He denies tinnitus, fullness and dizziness.  He worked in noise long term and son reported that he has had a stroke in recent past.    EVALUATION  See scanned audiogram: “Media” > “Audiology Report”.      RESULTS  Otoscopic Evaluation:  Right Ear: clear ear canal  Left Ear: clear ear canal    Immittance Measures:  Tympanometry:  Right Ear: Type A, normal tympanic membrane mobility with normal middle ear pressure   Left Ear: Type A, normal tympanic membrane mobility with normal middle ear pressure     Acoustic Reflexes:  Ipsilateral Right Ear:  100 100 100 100   Ipsilateral Left Ear:  NR NR NR NR   Contralateral Right Ear: did not evaluate  Contralateral Left Ear: did not evaluate    Distortion Product Otoacoustic Emissions (DPOAEs):  Right Ear:  REFER : 1-8 K   Left Ear:     PASS: 1.5, 2 K   REFER : 1, 3-8 K     Audiometry:  Test Technique and Reliability:  BEHAVIORAL   Standard audiometry via supra-aural headphones. Reliability is good.    Pure tone air and bone conduction audiometry:  Right Ear: MILD  SNHL STARTING  HZ RISING TO NORMAL AT 1500- 2 K , FALLING TO MODERATELY SEVERE SNHL AT 3- 8 K HZ. ASYMMETRY NOTED.  Left Ear: MILD  RISING TO NORMAL AT 1- 2 K HZ FALLING TO MILD MODERATELY SEVERE AT 3-8 K HZ.        Speech Audiometry (Word Recognition Scores):   Right Ear:  88%  Left Ear:     92%    IMPRESSIONS   MILD MODERATELY SEVERE SNHL IN BOTH EARS WITH ASYMMETRY.  The presence of acoustic reflexes within normal intensity limits is consistent with normal middle ear and brainstem function, and suggests that auditory sensitivity is not significantly  impaired. An elevated or absent acoustic reflex threshold is consistent with a middle ear disorder, hearing loss in the stimulated ear, and/or interruption of neural innervation of the stapedius muscle. Present DPOAEs suggest normal/near normal cochlear outer hair cell function and are consistent with no greater than a mild hearing loss at those frequencies. Absent DPOAEs are consistent with abnormal cochlear outer hair cell function and some degree of hearing loss at those frequencies.    RECOMMENDATIONS  - Follow up with otolaryngology today as scheduled. Special tests as needed for asymmetry   - Audiologic evaluation as needed.  - Annual audiologic evaluation, sooner if an acute change is noted.  - Audiologic evaluation in conjunction with otologic care, if an acute change is noted, and/or annually.  - Consider hearing aids.  Discussed benefits .Contact insurance to determine if there is an applicable benefit and where it can be used. Contact our office to schedule an appointment should he wish to proceed with hearing aids through our clinic.  - Consider hearing aids. Contact insurance to determine if there is an applicable benefit and where it can be used.  - Follow-up with audiology annually for routine hearing aid maintenance, sooner if questions/problems arise.  - Follow-up with medical care team as planned.    PATIENT EDUCATION  Discussed results, impressions and recommendations with the patient. Questions were addressed and the patient was encouraged to contact our office should concerns arise.    Time for this encounter: 35 minutes     Federico Galicia  Licensed Audiologist

## 2024-05-15 NOTE — PROGRESS NOTES
AUDIOLOGY ADULT AUDIOMETRIC EVALUATION    Name:  Eugenio Bliss  :  1955  Age:  68 y.o.  Date of Evaluation:  May 15, 2024    Reason for visit: Mr. Bliss is seen in the clinic today at the request of otolaryngology for an audiologic evaluation.     HISTORY  Patient complains of     EVALUATION  See scanned audiogram: “Media” > “Audiology Report”.        RESULTS  Otoscopic Evaluation:  Right Ear: clear ear canal  Left Ear: clear ear canal    Immittance Measures:  Tympanometry:  Right Ear:   Left Ear:     Acoustic Reflexes:  Ipsilateral Right Ear:   Ipsilateral Left Ear:   Contralateral Right Ear: did not evaluate  Contralateral Left Ear: did not evaluate    Distortion Product Otoacoustic Emissions (DPOAEs):  Right Ear:   Left Ear:     Audiometry:  Test Technique and Reliability:   Standard audiometry via supra-aural headphones. Reliability is good.    Pure tone air and bone conduction audiometry:  Right Ear:   Left Ear:     Speech Audiometry (Word Recognition Scores):   Right Ear:   Left Ear:     IMPRESSIONS    The presence of acoustic reflexes within normal intensity limits is consistent with normal middle ear and brainstem function, and suggests that auditory sensitivity is not significantly impaired. An elevated or absent acoustic reflex threshold is consistent with a middle ear disorder, hearing loss in the stimulated ear, and/or interruption of neural innervation of the stapedius muscle. Present DPOAEs suggest normal/near normal cochlear outer hair cell function and are consistent with no greater than a mild hearing loss at those frequencies. Absent DPOAEs are consistent with abnormal cochlear outer hair cell function and some degree of hearing loss at those frequencies.    RECOMMENDATIONS  - Follow up with otolaryngology today as scheduled.  - Audiologic evaluation as needed.  - Annual audiologic evaluation, sooner if an acute change is noted.  - Audiologic evaluation in conjunction with otologic  care, if an acute change is noted, and/or annually.  - Consider hearing aids. Contact insurance to determine if there is an applicable benefit and where it can be used. Contact our office to schedule an appointment should he wish to proceed with hearing aids through our clinic.  - Consider hearing aids. Contact insurance to determine if there is an applicable benefit and where it can be used.  - Continued hearing aid use.  - Follow-up with audiology annually for routine hearing aid maintenance, sooner if questions/problems arise.  - Follow-up with medical care team as planned.    PATIENT EDUCATION  Discussed results, impressions and recommendations with the patient. Questions were addressed and the patient was encouraged to contact our office should concerns arise.    Time for this encounter:    Federico Galicia  Licensed Audiologist

## 2024-05-15 NOTE — PROGRESS NOTES
"History Of Present Illness  05.15.24: Hearing test shows bilateral mild to moderate hearing loss.    Plan  1-consider using hearing aids  2-follow-up in 2 to 3 years  ______________________________________________________________________    Eugenio Bliss is a 68 y.o. male presenting with: \"Ears, hearing\".  He is kindly referred by Alison Lundberg, APRN-CNP.    He has decreased hearing.   History of exposure to loud sounds..    TMs look intact bilaterally.    Plan:  1- hearing test, follow up after the test     Past Medical History  He has a past medical history of Abdominal hernia (05/16/2023), Achilles tendinitis, right leg, Acute frontal sinusitis, unspecified (02/03/2020), Allergic rhinitis (05/16/2023), Body mass index (BMI) 39.0-39.9, adult (05/25/2021), CHF (congestive heart failure) (Multi), Chondrocostal junction syndrome (tietze) (07/22/2013), Contact with and (suspected) exposure to covid-19, COPD (chronic obstructive pulmonary disease) (Multi), Deficiency of other specified B group vitamins (12/03/2019), Diabetes mellitus (Multi), Dry eye syndrome of unspecified lacrimal gland (12/29/2014), Effusion, right ankle (06/13/2018), Encounter for screening for malignant neoplasm of colon (05/19/2016), Encounter for screening for malignant neoplasm of prostate (04/24/2019), Gallbladder attack (05/16/2023), Hypertension (05/21/2023), Inguinal hernia (05/21/2023), Obesity, unspecified (05/04/2022), Other conditions influencing health status (04/22/2013), Other conditions influencing health status (08/14/2019), Other conditions influencing health status (04/22/2013), Pain in right ankle and joints of right foot, Pain in unspecified elbow (07/10/2017), Personal history of diseases of the blood and blood-forming organs and certain disorders involving the immune mechanism (12/03/2019), Personal history of diseases of the blood and blood-forming organs and certain disorders involving the immune mechanism (12/03/2019), " Personal history of other (healed) physical injury and trauma (04/16/2019), Personal history of other diseases of the digestive system (12/03/2019), Personal history of other diseases of the digestive system (07/08/2022), Personal history of other diseases of the respiratory system, Personal history of other diseases of urinary system (12/03/2019), Personal history of other endocrine, nutritional and metabolic disease (04/22/2013), Personal history of other endocrine, nutritional and metabolic disease (07/29/2016), Personal history of other specified conditions (10/17/2019), Personal history of other specified conditions, Personal history of other specified conditions (09/06/2013), Personal history of other specified conditions (04/22/2013), Personal history of pneumonia (recurrent) (09/04/2020), Pleurodynia (06/05/2020), Pneumonia of left lung due to infectious organism (05/21/2023), Pure hypercholesterolemia, unspecified (11/08/2013), Right knee pain (05/21/2023), Sjogren syndrome, unspecified (Multi), Snoring (05/21/2023), Stiffness of right ankle, not elsewhere classified (06/13/2018), 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    Surgical History  He has a past surgical history that includes Ankle surgery (04/17/2013); Knee surgery (04/17/2013); and Hernia repair (04/17/2013).     Social History  He reports that he has never smoked. He has never used smokeless tobacco. He reports that he does not currently use alcohol. He reports that he does not use drugs.    Family History  No family history on file.     Allergies  Ciprofloxacin, Levofloxacin, and Penicillins    Review of Systems (initial ROS)  Recent weight loss  Difficulty hearing  Loss of hearing  Ears feel full  Snoring  Dry mouth/mouth breathing  Leg pain  Shortness of breath  Shortness of breath on exertion  Joint pain  Muscle pain  Joint  "swelling  Joint stiffness  Limb pain  Limb swelling  Dry skin  Migraines  Restless leg  Weakness  Sleep disturbance  Anxiety  Depression  Increased thirst  Muscle weakness  Deepening of voice  Easy bruising     Physical Exam (old exam)    General appearance: Healthy-appearing, well-nourished, well groomed, in no acute distress.     Head and Face: Atraumatic with no masses, lesions, or scarring.      Salivary glands: No tenderness of the parotid glands or parotid masses.     No tenderness of the submandibular glands or submandibular masses.      Facial strength: Normal strength and symmetry, no synkinesis or facial tic.     Eyes: Conjunctivas look non-hyperemic bilaterally    Ears: Bilaterally ear canals look normal. Tympanic membranes look intact, no hyperemia, fluid or retraction. Hearing grossly normal.      Nose: Mucosa looks normal. No purulent discharge.      Oral Cavity/Mouth: Lips and tongue look normal.     Throat: No postnasal discharge. No tonsil hypertrophy. No hyperemia.    Neck: Symmetrical, trachea midline.     Pulmonary: Normal respiratory effort.     Lymphatic: No palpable pathologic lymph nodes at neck.     Neurological/Psychiatric Orientation to person, place, and time: Normal.     Mood and affect: Normal.      Extremities: No clubbing.     Skin: No significant skin lesions were noted at face or neck        Procedure         Last Recorded Vitals  There were no vitals taken for this visit.    Relevant Results    Assessment and Plan:    05.15.24: Hearing test shows bilateral mild to moderate hearing loss.    Plan  1-consider using hearing aids  2-follow-up in 2 to 3 years  ______________________________________________________________________    Eugenio Bliss is a 68 y.o. male presenting with: \"Ears, hearing\".  He is kindly referred by Alison Lundberg APRN-CNP.    He has decreased hearing.   History of exposure to loud sounds..    TMs look intact bilaterally.    Plan:  1- hearing test, follow up after " the test      Ness Paul  Otolaryngology - Head & Neck Surgery

## 2024-05-23 ENCOUNTER — OFFICE VISIT (OUTPATIENT)
Dept: PAIN MEDICINE | Facility: CLINIC | Age: 69
End: 2024-05-23
Payer: MEDICARE

## 2024-05-23 VITALS
BODY MASS INDEX: 30.66 KG/M2 | OXYGEN SATURATION: 97 % | HEART RATE: 101 BPM | DIASTOLIC BLOOD PRESSURE: 70 MMHG | SYSTOLIC BLOOD PRESSURE: 110 MMHG | WEIGHT: 207 LBS | HEIGHT: 69 IN | RESPIRATION RATE: 18 BRPM

## 2024-05-23 DIAGNOSIS — M47.816 LUMBAR SPONDYLOSIS: Primary | ICD-10-CM

## 2024-05-23 DIAGNOSIS — M48.062 SPINAL STENOSIS OF LUMBAR REGION WITH NEUROGENIC CLAUDICATION: ICD-10-CM

## 2024-05-23 PROCEDURE — 4010F ACE/ARB THERAPY RXD/TAKEN: CPT | Performed by: NURSE PRACTITIONER

## 2024-05-23 PROCEDURE — 1159F MED LIST DOCD IN RCRD: CPT | Performed by: NURSE PRACTITIONER

## 2024-05-23 PROCEDURE — 1160F RVW MEDS BY RX/DR IN RCRD: CPT | Performed by: NURSE PRACTITIONER

## 2024-05-23 PROCEDURE — 3074F SYST BP LT 130 MM HG: CPT | Performed by: NURSE PRACTITIONER

## 2024-05-23 PROCEDURE — 3044F HG A1C LEVEL LT 7.0%: CPT | Performed by: NURSE PRACTITIONER

## 2024-05-23 PROCEDURE — 1126F AMNT PAIN NOTED NONE PRSNT: CPT | Performed by: NURSE PRACTITIONER

## 2024-05-23 PROCEDURE — 1157F ADVNC CARE PLAN IN RCRD: CPT | Performed by: NURSE PRACTITIONER

## 2024-05-23 PROCEDURE — 99213 OFFICE O/P EST LOW 20 MIN: CPT | Performed by: NURSE PRACTITIONER

## 2024-05-23 PROCEDURE — 1036F TOBACCO NON-USER: CPT | Performed by: NURSE PRACTITIONER

## 2024-05-23 PROCEDURE — 3078F DIAST BP <80 MM HG: CPT | Performed by: NURSE PRACTITIONER

## 2024-05-23 ASSESSMENT — PAIN SCALES - GENERAL
PAINLEVEL: 0-NO PAIN
PAINLEVEL_OUTOF10: 0 - NO PAIN

## 2024-05-23 ASSESSMENT — PATIENT HEALTH QUESTIONNAIRE - PHQ9
2. FEELING DOWN, DEPRESSED OR HOPELESS: NOT AT ALL
SUM OF ALL RESPONSES TO PHQ9 QUESTIONS 1 AND 2: 0
1. LITTLE INTEREST OR PLEASURE IN DOING THINGS: NOT AT ALL

## 2024-05-23 ASSESSMENT — PAIN - FUNCTIONAL ASSESSMENT: PAIN_FUNCTIONAL_ASSESSMENT: 0-10

## 2024-05-23 NOTE — PROGRESS NOTES
Subjective   Patient ID: Eugenio Bliss is a 68 y.o. male who presents for Pain Management.    HPI 68-year-old male with a past medical history significant for CAD, atrial fibrillation on Eliquis, COPD on 3 L nasal cannula at bedtime, nonalcoholic fatty liver disease, diabetes melitis 2, SLE, Sjogren syndrome, gout, MCI, lumbar spondylosis and spinal stenosis of the lumbar region with neurogenic claudication.  He presents for follow-up to his second diagnostic bilateral lumbar medial branch nerve blocks at the L4-5, L5-S1 facets with local anesthetic only by Dr. Carson on 5/7/2024. The patient reports 100% decrease in pain for several hours post-procedure. He is pleased with these results and is eager to proceed with RFA.      Review of Systems Unless noted in the HPI all other systems have been reviewed and are negative for complaint.     Objective   Physical Exam  General- No acute distress, well appearing and well nourished.   Eyes Conjunctiva and lids: No erythema, swelling or discharge  Neck - Supple, no cervical lymphadenopathy.   Pulmonary - Respiratory effort: Normal respiration.   Cardiovascular - Normal rate and rhythm.  Examination of extremities for edema and/or varicosities: No peripheral edema  Abdomen: Soft, Non-tender, non-distended, no abdominal masses.   Musculoskeletal - Spine with reduced ROM.   Skin - Skin and subcutaneous tissue: Normal without rashes or lesions.  Neurologic - Antalgic Gait  Psychiatric - Orientation to person, place, and time: Normal. Mood and affect: Normal.    Assessment/Plan   Problem List Items Addressed This Visit       Lumbar spondylosis - Primary    Spinal stenosis of lumbar region with neurogenic claudication     TREATMENT PLAN:  I had a nice discussion with the patient today and our plan will be as follows:  Radiology: No new imaging to review at this time.   Physically: Encouraged patient to continue with increased physical activity as able.   Psychologically: No  acute psychological needs at this time.    Medication: Nothing at this time.  Duration: Chronic/ongoing.  Intervention: Patient is s/p medial branch nerve blocks of BL L4-5, L5-S1 facets with local anesthetic only by Dr. Carson on 5/7/2024. The patient reports 100% decrease in pain for several hours post-procedure. He is pleased with these results and is eager to proceed with RFA.

## 2024-06-05 ENCOUNTER — LAB (OUTPATIENT)
Dept: LAB | Facility: LAB | Age: 69
End: 2024-06-05
Payer: MEDICARE

## 2024-06-05 ENCOUNTER — OFFICE VISIT (OUTPATIENT)
Dept: PRIMARY CARE | Facility: CLINIC | Age: 69
End: 2024-06-05
Payer: MEDICARE

## 2024-06-05 VITALS
HEIGHT: 69 IN | WEIGHT: 206 LBS | OXYGEN SATURATION: 95 % | SYSTOLIC BLOOD PRESSURE: 134 MMHG | DIASTOLIC BLOOD PRESSURE: 82 MMHG | HEART RATE: 82 BPM | BODY MASS INDEX: 30.51 KG/M2

## 2024-06-05 DIAGNOSIS — R19.7 DIARRHEA OF PRESUMED INFECTIOUS ORIGIN: ICD-10-CM

## 2024-06-05 DIAGNOSIS — E03.9 ACQUIRED HYPOTHYROIDISM: ICD-10-CM

## 2024-06-05 DIAGNOSIS — R19.7 DIARRHEA OF PRESUMED INFECTIOUS ORIGIN: Primary | ICD-10-CM

## 2024-06-05 PROCEDURE — 3075F SYST BP GE 130 - 139MM HG: CPT | Performed by: NURSE PRACTITIONER

## 2024-06-05 PROCEDURE — 3079F DIAST BP 80-89 MM HG: CPT | Performed by: NURSE PRACTITIONER

## 2024-06-05 PROCEDURE — 3044F HG A1C LEVEL LT 7.0%: CPT | Performed by: NURSE PRACTITIONER

## 2024-06-05 PROCEDURE — 1123F ACP DISCUSS/DSCN MKR DOCD: CPT | Performed by: NURSE PRACTITIONER

## 2024-06-05 PROCEDURE — 1157F ADVNC CARE PLAN IN RCRD: CPT | Performed by: NURSE PRACTITIONER

## 2024-06-05 PROCEDURE — 80053 COMPREHEN METABOLIC PANEL: CPT

## 2024-06-05 PROCEDURE — 1158F ADVNC CARE PLAN TLK DOCD: CPT | Performed by: NURSE PRACTITIONER

## 2024-06-05 PROCEDURE — 4010F ACE/ARB THERAPY RXD/TAKEN: CPT | Performed by: NURSE PRACTITIONER

## 2024-06-05 PROCEDURE — 84443 ASSAY THYROID STIM HORMONE: CPT

## 2024-06-05 PROCEDURE — 85027 COMPLETE CBC AUTOMATED: CPT

## 2024-06-05 PROCEDURE — 99214 OFFICE O/P EST MOD 30 MIN: CPT | Performed by: NURSE PRACTITIONER

## 2024-06-05 PROCEDURE — 36415 COLL VENOUS BLD VENIPUNCTURE: CPT

## 2024-06-05 ASSESSMENT — ENCOUNTER SYMPTOMS
ABDOMINAL PAIN: 1
DEPRESSION: 0
DIARRHEA: 1
LOSS OF SENSATION IN FEET: 0
OCCASIONAL FEELINGS OF UNSTEADINESS: 1

## 2024-06-05 NOTE — PROGRESS NOTES
"Subjective   Patient ID: Eugenio Bliss is a 68 y.o. male who presents for Abdominal Pain (Patient complaints abdominal pain for the past 2 weeks that is not getting better. ), Diarrhea, and Knee Pain.    68-year-old male accompanied by his son.  States that he has been having liquid stools x 2 weeks 4-5 times a day. Yellow. Mid abd pain x 2 weeks. No fever or chills. No recent atbx, no recent change in meds. Taking anti diarrheal OTC meds- not working.  Denies epigastric pain or belching.  Denies blood in the stool.  No rectal pain.  Has not had any recent adjustments to his routine medications nor has he started any over-the-counter supplements, medications.  Denies fever or chills and has not come in contact with anybody he is aware of that has any of the similar GI concerns    Abdominal Pain  Associated symptoms include diarrhea.   Diarrhea   Associated symptoms include abdominal pain.   Knee Pain          Review of Systems   Gastrointestinal:  Positive for abdominal pain and diarrhea.       Objective   /82   Pulse 82   Ht 1.753 m (5' 9\")   Wt 93.4 kg (206 lb)   SpO2 95%   BMI 30.42 kg/m²     Physical Exam  Constitutional:       Appearance: Normal appearance. He is obese.   Cardiovascular:      Rate and Rhythm: Normal rate and regular rhythm.   Pulmonary:      Effort: Pulmonary effort is normal.      Breath sounds: Normal breath sounds.   Abdominal:      General: Abdomen is flat. Bowel sounds are normal. There is no distension.      Palpations: Abdomen is soft. There is no mass.      Tenderness: There is abdominal tenderness (Mild tenderness with deep palpation to the generalized central abdomen). There is no guarding or rebound.      Hernia: No hernia is present.   Skin:     General: Skin is warm and dry.   Neurological:      General: No focal deficit present.      Mental Status: He is alert and oriented to person, place, and time.   Psychiatric:         Mood and Affect: Mood normal.         " Behavior: Behavior normal.         Assessment/Plan   Diagnoses and all orders for this visit:  Diarrhea of presumed infectious origin  Comments:  stool sample rotovirus and cdiff.  Orders:  -     Rotavirus antigen, stool; Future  -     C. difficile, PCR; Future  -     Comprehensive Metabolic Panel; Future  -     CBC; Future  -     Thyroid Stimulating Hormone; Future  Acquired hypothyroidism  Comments:  update TSH to make sure not the cause of diarrhea. TSH from Jan was good  Orders:  -     Thyroid Stimulating Hormone; Future

## 2024-06-06 ENCOUNTER — LAB (OUTPATIENT)
Dept: LAB | Facility: LAB | Age: 69
End: 2024-06-06
Payer: MEDICARE

## 2024-06-06 DIAGNOSIS — R19.7 DIARRHEA OF PRESUMED INFECTIOUS ORIGIN: ICD-10-CM

## 2024-06-06 LAB
ALBUMIN SERPL BCP-MCNC: 3.9 G/DL (ref 3.4–5)
ALP SERPL-CCNC: 77 U/L (ref 33–136)
ALT SERPL W P-5'-P-CCNC: 9 U/L (ref 10–52)
ANION GAP SERPL CALC-SCNC: 18 MMOL/L (ref 10–20)
AST SERPL W P-5'-P-CCNC: 16 U/L (ref 9–39)
BILIRUB SERPL-MCNC: 1.2 MG/DL (ref 0–1.2)
BUN SERPL-MCNC: 8 MG/DL (ref 6–23)
C DIF TOX TCDA+TCDB STL QL NAA+PROBE: NOT DETECTED
CALCIUM SERPL-MCNC: 9.2 MG/DL (ref 8.6–10.3)
CHLORIDE SERPL-SCNC: 104 MMOL/L (ref 98–107)
CO2 SERPL-SCNC: 23 MMOL/L (ref 21–32)
CREAT SERPL-MCNC: 0.89 MG/DL (ref 0.5–1.3)
EGFRCR SERPLBLD CKD-EPI 2021: >90 ML/MIN/1.73M*2
ERYTHROCYTE [DISTWIDTH] IN BLOOD BY AUTOMATED COUNT: 14.1 % (ref 11.5–14.5)
GLUCOSE SERPL-MCNC: 70 MG/DL (ref 74–99)
HCT VFR BLD AUTO: 41.1 % (ref 41–52)
HGB BLD-MCNC: 13.3 G/DL (ref 13.5–17.5)
MCH RBC QN AUTO: 29.9 PG (ref 26–34)
MCHC RBC AUTO-ENTMCNC: 32.4 G/DL (ref 32–36)
MCV RBC AUTO: 92 FL (ref 80–100)
NRBC BLD-RTO: 0 /100 WBCS (ref 0–0)
PLATELET # BLD AUTO: 132 X10*3/UL (ref 150–450)
POTASSIUM SERPL-SCNC: 3.3 MMOL/L (ref 3.5–5.3)
PROT SERPL-MCNC: 6.7 G/DL (ref 6.4–8.2)
RBC # BLD AUTO: 4.45 X10*6/UL (ref 4.5–5.9)
SODIUM SERPL-SCNC: 142 MMOL/L (ref 136–145)
TSH SERPL-ACNC: 3.16 MIU/L (ref 0.44–3.98)
WBC # BLD AUTO: 5.1 X10*3/UL (ref 4.4–11.3)

## 2024-06-06 PROCEDURE — 87493 C DIFF AMPLIFIED PROBE: CPT

## 2024-06-06 PROCEDURE — 87425 ROTAVIRUS AG IA: CPT

## 2024-06-08 LAB — RV AG STL QL IA: NEGATIVE

## 2024-06-10 ENCOUNTER — TELEPHONE (OUTPATIENT)
Dept: PRIMARY CARE | Facility: CLINIC | Age: 69
End: 2024-06-10
Payer: MEDICARE

## 2024-06-13 ENCOUNTER — APPOINTMENT (OUTPATIENT)
Dept: PRIMARY CARE | Facility: CLINIC | Age: 69
End: 2024-06-13
Payer: MEDICARE

## 2024-06-13 ENCOUNTER — LAB (OUTPATIENT)
Dept: LAB | Facility: LAB | Age: 69
End: 2024-06-13
Payer: MEDICARE

## 2024-06-13 VITALS
DIASTOLIC BLOOD PRESSURE: 72 MMHG | WEIGHT: 210 LBS | HEART RATE: 71 BPM | BODY MASS INDEX: 31.1 KG/M2 | OXYGEN SATURATION: 95 % | SYSTOLIC BLOOD PRESSURE: 134 MMHG | HEIGHT: 69 IN

## 2024-06-13 DIAGNOSIS — R10.84 GENERALIZED ABDOMINAL PAIN: ICD-10-CM

## 2024-06-13 DIAGNOSIS — K59.1 FUNCTIONAL DIARRHEA: Primary | ICD-10-CM

## 2024-06-13 DIAGNOSIS — K59.1 FUNCTIONAL DIARRHEA: ICD-10-CM

## 2024-06-13 LAB
ALBUMIN SERPL BCP-MCNC: 3.9 G/DL (ref 3.4–5)
ALP SERPL-CCNC: 79 U/L (ref 33–136)
ALT SERPL W P-5'-P-CCNC: 9 U/L (ref 10–52)
AMYLASE SERPL-CCNC: 46 U/L (ref 29–103)
ANION GAP SERPL CALC-SCNC: 14 MMOL/L (ref 10–20)
AST SERPL W P-5'-P-CCNC: 15 U/L (ref 9–39)
BASOPHILS # BLD AUTO: 0.03 X10*3/UL (ref 0–0.1)
BASOPHILS NFR BLD AUTO: 0.7 %
BILIRUB SERPL-MCNC: 1.2 MG/DL (ref 0–1.2)
BUN SERPL-MCNC: 11 MG/DL (ref 6–23)
CALCIUM SERPL-MCNC: 9.1 MG/DL (ref 8.6–10.3)
CHLORIDE SERPL-SCNC: 98 MMOL/L (ref 98–107)
CO2 SERPL-SCNC: 31 MMOL/L (ref 21–32)
CREAT SERPL-MCNC: 0.96 MG/DL (ref 0.5–1.3)
EGFRCR SERPLBLD CKD-EPI 2021: 86 ML/MIN/1.73M*2
EOSINOPHIL # BLD AUTO: 0.27 X10*3/UL (ref 0–0.7)
EOSINOPHIL NFR BLD AUTO: 5.9 %
ERYTHROCYTE [DISTWIDTH] IN BLOOD BY AUTOMATED COUNT: 14.1 % (ref 11.5–14.5)
GLUCOSE SERPL-MCNC: 103 MG/DL (ref 74–99)
HCT VFR BLD AUTO: 38.8 % (ref 41–52)
HGB BLD-MCNC: 12.7 G/DL (ref 13.5–17.5)
IMM GRANULOCYTES # BLD AUTO: 0.02 X10*3/UL (ref 0–0.7)
IMM GRANULOCYTES NFR BLD AUTO: 0.4 % (ref 0–0.9)
LIPASE SERPL-CCNC: 77 U/L (ref 9–82)
LYMPHOCYTES # BLD AUTO: 0.99 X10*3/UL (ref 1.2–4.8)
LYMPHOCYTES NFR BLD AUTO: 21.5 %
MAGNESIUM SERPL-MCNC: 1.6 MG/DL (ref 1.6–2.4)
MCH RBC QN AUTO: 30.3 PG (ref 26–34)
MCHC RBC AUTO-ENTMCNC: 32.7 G/DL (ref 32–36)
MCV RBC AUTO: 93 FL (ref 80–100)
MONOCYTES # BLD AUTO: 0.46 X10*3/UL (ref 0.1–1)
MONOCYTES NFR BLD AUTO: 10 %
NEUTROPHILS # BLD AUTO: 2.83 X10*3/UL (ref 1.2–7.7)
NEUTROPHILS NFR BLD AUTO: 61.5 %
NRBC BLD-RTO: 0 /100 WBCS (ref 0–0)
PLATELET # BLD AUTO: 119 X10*3/UL (ref 150–450)
POTASSIUM SERPL-SCNC: 3.7 MMOL/L (ref 3.5–5.3)
PROT SERPL-MCNC: 6.4 G/DL (ref 6.4–8.2)
RBC # BLD AUTO: 4.19 X10*6/UL (ref 4.5–5.9)
SODIUM SERPL-SCNC: 139 MMOL/L (ref 136–145)
WBC # BLD AUTO: 4.6 X10*3/UL (ref 4.4–11.3)

## 2024-06-13 PROCEDURE — 1157F ADVNC CARE PLAN IN RCRD: CPT | Performed by: NURSE PRACTITIONER

## 2024-06-13 PROCEDURE — 83690 ASSAY OF LIPASE: CPT

## 2024-06-13 PROCEDURE — 1123F ACP DISCUSS/DSCN MKR DOCD: CPT | Performed by: NURSE PRACTITIONER

## 2024-06-13 PROCEDURE — 3078F DIAST BP <80 MM HG: CPT | Performed by: NURSE PRACTITIONER

## 2024-06-13 PROCEDURE — 85025 COMPLETE CBC W/AUTO DIFF WBC: CPT

## 2024-06-13 PROCEDURE — 1158F ADVNC CARE PLAN TLK DOCD: CPT | Performed by: NURSE PRACTITIONER

## 2024-06-13 PROCEDURE — 1159F MED LIST DOCD IN RCRD: CPT | Performed by: NURSE PRACTITIONER

## 2024-06-13 PROCEDURE — 83735 ASSAY OF MAGNESIUM: CPT

## 2024-06-13 PROCEDURE — 36415 COLL VENOUS BLD VENIPUNCTURE: CPT

## 2024-06-13 PROCEDURE — 82150 ASSAY OF AMYLASE: CPT

## 2024-06-13 PROCEDURE — 80053 COMPREHEN METABOLIC PANEL: CPT

## 2024-06-13 PROCEDURE — 4010F ACE/ARB THERAPY RXD/TAKEN: CPT | Performed by: NURSE PRACTITIONER

## 2024-06-13 PROCEDURE — 3075F SYST BP GE 130 - 139MM HG: CPT | Performed by: NURSE PRACTITIONER

## 2024-06-13 PROCEDURE — 3044F HG A1C LEVEL LT 7.0%: CPT | Performed by: NURSE PRACTITIONER

## 2024-06-13 PROCEDURE — 99213 OFFICE O/P EST LOW 20 MIN: CPT | Performed by: NURSE PRACTITIONER

## 2024-06-13 NOTE — PROGRESS NOTES
"Patient ID: Eugenio Bliss is a 68 y.o. male.    Subjective   Patient ID: Eugenio Bliss is a 68 y.o. male who presents for Follow-up (Patient is here today for a 1 week follow up, no complaints).    68 year old male accompanied by son. He is feeling better but still with diarrhea 3 times day brown and yellow. No blood. Not black. Mild abd pain mid abd. No emesis. Eating and drinking better.          Review of Systems    Objective   /72   Pulse 71   Ht 1.753 m (5' 9\")   Wt 95.3 kg (210 lb)   SpO2 95%   BMI 31.01 kg/m²     Physical Exam  Constitutional:       Appearance: Normal appearance. He is obese.   Cardiovascular:      Rate and Rhythm: Normal rate and regular rhythm.   Pulmonary:      Effort: Pulmonary effort is normal.      Breath sounds: Normal breath sounds.   Abdominal:      General: Bowel sounds are normal. There is no distension.      Palpations: There is no mass.      Tenderness: There is abdominal tenderness (mid abd discomfort with deep palpation). There is no rebound.   Musculoskeletal:         General: Normal range of motion.   Skin:     General: Skin is warm and dry.   Neurological:      General: No focal deficit present.      Mental Status: He is alert and oriented to person, place, and time.   Psychiatric:         Mood and Affect: Mood normal.         Behavior: Behavior normal.         Assessment/Plan   Diagnoses and all orders for this visit:  Functional diarrhea  Comments:  update labs. if abn we will do CT of abdomen. Pt agreeable with plan  Orders:  -     Magnesium; Future  -     CBC and Auto Differential; Future  -     Comprehensive Metabolic Panel; Future  -     Amylase; Future  -     Lipase; Future  Generalized abdominal pain         "

## 2024-06-13 NOTE — PATIENT INSTRUCTIONS
Metamucil daily  I will call you with lab results. If the metamucil is not effective then we will do a CT scan

## 2024-06-25 ENCOUNTER — ANCILLARY PROCEDURE (OUTPATIENT)
Dept: RADIOLOGY | Facility: EXTERNAL LOCATION | Age: 69
End: 2024-06-25
Payer: MEDICARE

## 2024-06-25 DIAGNOSIS — M47.816 SPONDYLOSIS WITHOUT MYELOPATHY OR RADICULOPATHY, LUMBAR REGION: ICD-10-CM

## 2024-06-25 PROCEDURE — 64636 DESTROY L/S FACET JNT ADDL: CPT | Performed by: ANESTHESIOLOGY

## 2024-06-25 PROCEDURE — 99153 MOD SED SAME PHYS/QHP EA: CPT | Performed by: ANESTHESIOLOGY

## 2024-06-25 PROCEDURE — 99152 MOD SED SAME PHYS/QHP 5/>YRS: CPT | Performed by: ANESTHESIOLOGY

## 2024-06-25 PROCEDURE — 64635 DESTROY LUMB/SAC FACET JNT: CPT | Performed by: ANESTHESIOLOGY

## 2024-06-25 NOTE — PROGRESS NOTES
OPERATIVE NOTE  Preprocedure diagnosis: Lumbar spondylosis  Postprocedure diagnosis Lumbar spondylosis    Procedure performed: BL L4-5, L5-S1 MBN RFA  Physician: Titi Carson MD  Anesthesia: Minimal-Moderate IV conscious sedation  Complications: none  Blood loss:  Nil    Clinical note: This is a very pleasant 68 y.o. old male with PMH CAD, AF on ELIQUIS, COPD on 3 L NC at bedtime, NAFLD, NIDDM2, lupus, Sjogren, gout, MCI, schizophrenia who suffers with LBP here meeting all medical criteria for above-mentioned procedure. Patient's pain stable and persistent from last visit.  Appropriately NPO.  No personal/family hx issues with anesthesia. Denies allergies to Latex, steroids, local anesthetics, or iodine/contrast. Denies fever, chills, NS, CP, SOB, cough, N/V.    Discussed procedure risks/benefits in detail with patient. Pt meets medical necessity for procedure due to failure of conservative measures. Reviewed procedural risks including bleeding, infection, nerve damage, paralysis. Also reviewed mitigating factors such as screening for infection/blood thinner use, sterile precautions, and image-guidance when applicable. All questions answered. Pt/guardian expressed understanding and choose to proceed      Procedure:    Procedure: BILATERAL L4-5, L5-S1 Lumbar medial branch nerve radiofrequency ablation (LMBN RFA)   Location:  [facility]  ProcedureDate: 2024  Patient Name: Eugenio Bliss  : 1955    Allergies: Ciprofloxacin, Levofloxacin, and Penicillins   Time Out Performed: Yes   Proceduralist: Titi Carson MD  Consent Obtained and Documented: Confirmed    Correct patient: Confirmed    Correct procedure: Confirmed    Correct side/site: Confirmed    Correct patient position: Confirmed    Correct side/site marking visible: Confirmed    Correct X-Rays available: Confirmed      Equipment available: Confirmed   Diagnosis: lumbar spondylosis  Patient Position: Prone     Sterile prep with:  "Chloraprep and draped with sterile towels in the standard fashion   Needle(s): 20 G, 100 mm, 10 mm active tip    Needle temp: 80 degrees C for 90 seconds   Fluoroscopy used for image guidance for this procedures     Description of needle placement: Needles placed in each of 3 positions:  Needles were placed at the \"eye of the elizabeth dog\" on the LEFT pedicle of L4, L5 and the sacral ala.  Needle placement confirmed in AP/lateral. Sensory testing up performed at each level to reproduce stimulation in area of low back pain.  Motor testing up to 2 V was done at each level to confirm safe electrode placement in addition to AP and LAT imaging. 1 cc of a mixture of 2% lidocaine was given at each level following testing to anesthetize medial branch nerve.  Radiofrequency ablation performed at all 3 levels with specifications noted above.    Procedure repeated on contralateral side in similar fashion without issue.    Patient tolerated procedure well. Hemostasis achieved. Band-aids placed over injection sites    Images saved (PACS)     See nursing flowsheets for VS and sedation details    DISCHARGE SUMMARY:   Complications: None   Condition on Discharge: Stable and unchanged from admission   Disposition/Discharge: Home   Written Post-Procedure Instructions with Emergency Contact Information Were Given to Patient Prior to Discharge, patient or caregiver acknowledged receipt.       Titi Carson MD  Anesthesiologist & Interventional Pain Physician   [facility] [place]  O: [phone]  F: [phone]  9:14 AM  06/25/24          "

## 2024-07-12 DIAGNOSIS — G47.00 INSOMNIA, UNSPECIFIED: ICD-10-CM

## 2024-07-17 RX ORDER — HYDROXYZINE HYDROCHLORIDE 25 MG/1
TABLET, FILM COATED ORAL
Qty: 90 TABLET | Refills: 1 | Status: SHIPPED | OUTPATIENT
Start: 2024-07-17

## 2024-08-20 ENCOUNTER — APPOINTMENT (OUTPATIENT)
Dept: PRIMARY CARE | Facility: CLINIC | Age: 69
End: 2024-08-20
Payer: MEDICARE

## 2024-08-20 VITALS
WEIGHT: 215 LBS | HEART RATE: 75 BPM | DIASTOLIC BLOOD PRESSURE: 85 MMHG | BODY MASS INDEX: 31.84 KG/M2 | OXYGEN SATURATION: 95 % | HEIGHT: 69 IN | SYSTOLIC BLOOD PRESSURE: 155 MMHG

## 2024-08-20 DIAGNOSIS — D64.9 ANEMIA, UNSPECIFIED TYPE: ICD-10-CM

## 2024-08-20 DIAGNOSIS — I48.91 UNSPECIFIED ATRIAL FIBRILLATION (MULTI): ICD-10-CM

## 2024-08-20 DIAGNOSIS — M06.9 RHEUMATOID ARTHRITIS, UNSPECIFIED (MULTI): ICD-10-CM

## 2024-08-20 DIAGNOSIS — M25.572 BILATERAL ANKLE PAIN, UNSPECIFIED CHRONICITY: ICD-10-CM

## 2024-08-20 DIAGNOSIS — M25.571 BILATERAL ANKLE PAIN, UNSPECIFIED CHRONICITY: ICD-10-CM

## 2024-08-20 DIAGNOSIS — K21.9 GASTRO-ESOPHAGEAL REFLUX DISEASE WITHOUT ESOPHAGITIS: ICD-10-CM

## 2024-08-20 DIAGNOSIS — E03.9 HYPOTHYROIDISM, UNSPECIFIED: ICD-10-CM

## 2024-08-20 DIAGNOSIS — Z13.1 DIABETES MELLITUS SCREENING: ICD-10-CM

## 2024-08-20 DIAGNOSIS — G89.29 CHRONIC BILATERAL BACK PAIN, UNSPECIFIED BACK LOCATION: ICD-10-CM

## 2024-08-20 DIAGNOSIS — M25.562 CHRONIC PAIN OF BOTH KNEES: ICD-10-CM

## 2024-08-20 DIAGNOSIS — G89.29 CHRONIC PAIN OF BOTH KNEES: ICD-10-CM

## 2024-08-20 DIAGNOSIS — I13.0 HYPERTENSIVE HEART AND CHRONIC KIDNEY DISEASE WITH HEART FAILURE AND STAGE 1 THROUGH STAGE 4 CHRONIC KIDNEY DISEASE, OR UNSPECIFIED CHRONIC KIDNEY DISEASE (MULTI): ICD-10-CM

## 2024-08-20 DIAGNOSIS — R73.03 PRE-DIABETES: ICD-10-CM

## 2024-08-20 DIAGNOSIS — Z12.5 SCREENING FOR PROSTATE CANCER: ICD-10-CM

## 2024-08-20 DIAGNOSIS — E78.2 MIXED HYPERLIPIDEMIA: ICD-10-CM

## 2024-08-20 DIAGNOSIS — M32.9 SYSTEMIC LUPUS ERYTHEMATOSUS, UNSPECIFIED SLE TYPE, UNSPECIFIED ORGAN INVOLVEMENT STATUS (MULTI): ICD-10-CM

## 2024-08-20 DIAGNOSIS — M25.561 CHRONIC PAIN OF BOTH KNEES: ICD-10-CM

## 2024-08-20 DIAGNOSIS — M54.9 CHRONIC BILATERAL BACK PAIN, UNSPECIFIED BACK LOCATION: ICD-10-CM

## 2024-08-20 DIAGNOSIS — Z00.00 ROUTINE GENERAL MEDICAL EXAMINATION AT HEALTH CARE FACILITY: Primary | ICD-10-CM

## 2024-08-20 DIAGNOSIS — M1A.4690 OTHER SECONDARY CHRONIC GOUT OF KNEE WITHOUT TOPHUS, UNSPECIFIED LATERALITY: ICD-10-CM

## 2024-08-20 PROCEDURE — 1158F ADVNC CARE PLAN TLK DOCD: CPT | Performed by: NURSE PRACTITIONER

## 2024-08-20 PROCEDURE — 1157F ADVNC CARE PLAN IN RCRD: CPT | Performed by: NURSE PRACTITIONER

## 2024-08-20 PROCEDURE — 3077F SYST BP >= 140 MM HG: CPT | Performed by: NURSE PRACTITIONER

## 2024-08-20 PROCEDURE — 1170F FXNL STATUS ASSESSED: CPT | Performed by: NURSE PRACTITIONER

## 2024-08-20 PROCEDURE — 3044F HG A1C LEVEL LT 7.0%: CPT | Performed by: NURSE PRACTITIONER

## 2024-08-20 PROCEDURE — 1160F RVW MEDS BY RX/DR IN RCRD: CPT | Performed by: NURSE PRACTITIONER

## 2024-08-20 PROCEDURE — 1123F ACP DISCUSS/DSCN MKR DOCD: CPT | Performed by: NURSE PRACTITIONER

## 2024-08-20 PROCEDURE — 1159F MED LIST DOCD IN RCRD: CPT | Performed by: NURSE PRACTITIONER

## 2024-08-20 PROCEDURE — 3008F BODY MASS INDEX DOCD: CPT | Performed by: NURSE PRACTITIONER

## 2024-08-20 PROCEDURE — G0439 PPPS, SUBSEQ VISIT: HCPCS | Performed by: NURSE PRACTITIONER

## 2024-08-20 PROCEDURE — 1036F TOBACCO NON-USER: CPT | Performed by: NURSE PRACTITIONER

## 2024-08-20 PROCEDURE — 3079F DIAST BP 80-89 MM HG: CPT | Performed by: NURSE PRACTITIONER

## 2024-08-20 PROCEDURE — 4010F ACE/ARB THERAPY RXD/TAKEN: CPT | Performed by: NURSE PRACTITIONER

## 2024-08-20 RX ORDER — LEVOTHYROXINE SODIUM 50 UG/1
50 TABLET ORAL DAILY
Qty: 90 TABLET | Refills: 3 | Status: SHIPPED | OUTPATIENT
Start: 2024-08-20

## 2024-08-20 RX ORDER — DICLOFENAC SODIUM 10 MG/G
4 GEL TOPICAL 4 TIMES DAILY PRN
Qty: 150 G | Refills: 3 | Status: SHIPPED | OUTPATIENT
Start: 2024-08-20

## 2024-08-20 RX ORDER — ALLOPURINOL 100 MG/1
100 TABLET ORAL DAILY
COMMUNITY
End: 2024-08-20 | Stop reason: SDUPTHER

## 2024-08-20 RX ORDER — ATORVASTATIN CALCIUM 40 MG/1
40 TABLET, FILM COATED ORAL DAILY
Qty: 90 TABLET | Refills: 3 | Status: SHIPPED | OUTPATIENT
Start: 2024-08-20

## 2024-08-20 RX ORDER — LOSARTAN POTASSIUM 50 MG/1
75 TABLET ORAL DAILY
Qty: 90 TABLET | Refills: 3 | Status: SHIPPED | OUTPATIENT
Start: 2024-08-20

## 2024-08-20 RX ORDER — ALLOPURINOL 100 MG/1
100 TABLET ORAL DAILY
Qty: 90 TABLET | Refills: 3 | Status: SHIPPED | OUTPATIENT
Start: 2024-08-20

## 2024-08-20 RX ORDER — METOPROLOL TARTRATE 75 MG/1
75 TABLET, FILM COATED ORAL 2 TIMES DAILY
Qty: 180 TABLET | Refills: 3 | Status: SHIPPED | OUTPATIENT
Start: 2024-08-20

## 2024-08-20 RX ORDER — PANTOPRAZOLE SODIUM 20 MG/1
20 TABLET, DELAYED RELEASE ORAL DAILY
Qty: 90 TABLET | Refills: 3 | Status: SHIPPED | OUTPATIENT
Start: 2024-08-20

## 2024-08-20 RX ORDER — HYDROXYCHLOROQUINE SULFATE 200 MG/1
200 TABLET, FILM COATED ORAL DAILY
Qty: 90 TABLET | Refills: 3 | Status: SHIPPED | OUTPATIENT
Start: 2024-08-20

## 2024-08-20 RX ORDER — GABAPENTIN 100 MG/1
100 CAPSULE ORAL DAILY PRN
Qty: 90 CAPSULE | Refills: 3 | Status: SHIPPED | OUTPATIENT
Start: 2024-08-20

## 2024-08-20 ASSESSMENT — ACTIVITIES OF DAILY LIVING (ADL)
TAKING_MEDICATION: INDEPENDENT
MANAGING_FINANCES: INDEPENDENT
DOING_HOUSEWORK: INDEPENDENT
DRESSING: INDEPENDENT
GROCERY_SHOPPING: INDEPENDENT
BATHING: INDEPENDENT

## 2024-08-20 ASSESSMENT — PATIENT HEALTH QUESTIONNAIRE - PHQ9
1. LITTLE INTEREST OR PLEASURE IN DOING THINGS: NOT AT ALL
2. FEELING DOWN, DEPRESSED OR HOPELESS: NOT AT ALL
SUM OF ALL RESPONSES TO PHQ9 QUESTIONS 1 AND 2: 0

## 2024-08-20 ASSESSMENT — ENCOUNTER SYMPTOMS
OCCASIONAL FEELINGS OF UNSTEADINESS: 1
DEPRESSION: 0
LOSS OF SENSATION IN FEET: 1

## 2024-08-20 NOTE — PATIENT INSTRUCTIONS
Get your labs when fasting at least 8 hours  Call and schedule an appointment with cardio for Afib  Magnesium glycinate 100 to 200 mg a.m. and p.m. to help with pain.  You can get this at [facility] As fast as the brand

## 2024-08-20 NOTE — PROGRESS NOTES
"Subjective   Patient ID: Eugenio Bliss is a 68 y.o. male who presents for Medicare Annual Wellness Visit Subsequent (Patient is here today for a medicare wellness, no complaints. ).    68 year old male here for annual Medicare Wellness Exam  No acute concerns  Meds and labs reviewed.   Pre-Diabetes  HTN- on BB, ACE-I  HLD- on Statin  Autoimmune- on plaquinil, SLE, Sjogrens, Hypothyroid, RA, OA  Chronic pain- PRN gabapentin  Bipolar/depression- treated by psych q 90 days. Has extrapyramidal S&S on antipsycotics    Prevention:  Breast Ca screen: no fam HX  Colon Ca Screen: no fam HX. 2020 colonoscopy  Lung Ca Screen: no fam HX. Non smoker.   Prostate Ca Screen: no fam HX  CT Cardiac Score: Personal HX TIA; mom CV disease  Diabetes Screen: mom DM2. Personal HX pre diabetes  Opthal: wears glasses. No acute issues. Q 6 months for monitoring plaquenil and Sjogrens  Dental: has not gone  Exercise: none  Diet: caffeine- 1 -2 C a day; Sugar- a lot of sweet tea; ETOH- none; Salt- limits              Review of Systems    Objective   /85   Pulse 75   Ht 1.753 m (5' 9\")   Wt 97.5 kg (215 lb)   SpO2 95%   BMI 31.75 kg/m²     Physical Exam  Constitutional:       Appearance: Normal appearance. He is obese.   HENT:      Head: Normocephalic and atraumatic.   Neck:      Vascular: No carotid bruit.   Cardiovascular:      Rate and Rhythm: Rhythm irregular.      Pulses: Normal pulses.      Heart sounds: Murmur (3/6) heard.   Pulmonary:      Effort: Pulmonary effort is normal.      Breath sounds: Normal breath sounds.   Musculoskeletal:         General: Normal range of motion.      Cervical back: Normal range of motion and neck supple.   Lymphadenopathy:      Cervical: No cervical adenopathy.   Skin:     General: Skin is warm and dry.   Neurological:      General: No focal deficit present.      Mental Status: He is alert and oriented to person, place, and time. Mental status is at baseline.   Psychiatric:         Mood and " Affect: Mood normal.         Behavior: Behavior normal.      Comments: Extrapyramidal symptoms, tongue rolling         Assessment/Plan   Diagnoses and all orders for this visit:  Routine general medical examination at health care facility  -     1 Year Follow Up In Primary Care - Wellness Exam; Future  Bilateral ankle pain, unspecified chronicity  -     diclofenac sodium (Voltaren) 1 % gel; Apply 4.5 inches (4 g) topically 4 times a day as needed (arthritis).  Gastro-esophageal reflux disease without esophagitis  Comments:  on PPI. cinically asymptomatic  Orders:  -     pantoprazole (ProtoNix) 20 mg EC tablet; Take 1 tablet (20 mg) by mouth once daily. Do not crush, chew, or split.  Hypertensive heart and chronic kidney disease with heart failure and stage 1 through stage 4 chronic kidney disease, or unspecified chronic kidney disease (Multi)  Comments:  meds reviewed. refills complete as appropriate  Orders:  -     metoprolol tartrate (Lopressor) 75 mg tablet; Take 1 tablet (75 mg) by mouth 2 times a day.  -     losartan (Cozaar) 50 mg tablet; Take 1.5 tablets (75 mg) by mouth once daily.  -     atorvastatin (Lipitor) 40 mg tablet; Take 1 tablet (40 mg) by mouth once daily.  Rheumatoid arthritis, unspecified (Multi)  Comments:  Plaquenil.  Gets his eyes checked twice a year  Orders:  -     hydroxychloroquine (Plaquenil) 200 mg tablet; Take 1 tablet (200 mg) by mouth once daily.  Hypothyroidism, unspecified  Comments:  Levothyroxine reordered.  Most recent TSH looks good  Orders:  -     levothyroxine (Synthroid, Levoxyl) 50 mcg tablet; Take 1 tablet (50 mcg) by mouth once daily.  Systemic lupus erythematosus, unspecified SLE type, unspecified organ involvement status (Multi)  Comments:  On Plaquenil-recent sed rate reviewed.  Plaquenil refilled per patient request  Orders:  -     hydroxychloroquine (Plaquenil) 200 mg tablet; Take 1 tablet (200 mg) by mouth once daily.  Unspecified atrial fibrillation  (Multi)  Comments:  Stable on Eliquis.  Refill sent to the pharmacy per patient request  Orders:  -     apixaban (Eliquis) 5 mg tablet; Take 1 tablet (5 mg) by mouth 2 times a day.  -     Referral to Cardiology; Future  Pre-diabetes  -     Hemoglobin A1C; Future  Diabetes mellitus screening  -     Hemoglobin A1C; Future  -     Comprehensive Metabolic Panel; Future  Screening for prostate cancer  -     Prostate Specific Antigen, Screen; Future  -     PSA, Total and Free; Future  Chronic bilateral back pain, unspecified back location  -     gabapentin (Neurontin) 100 mg capsule; Take 1 capsule (100 mg) by mouth once daily as needed (pain).  Mixed hyperlipidemia  -     atorvastatin (Lipitor) 40 mg tablet; Take 1 tablet (40 mg) by mouth once daily.  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  Chronic pain of both knees  -     diclofenac sodium (Voltaren) 1 % gel; Apply 4.5 inches (4 g) topically 4 times a day as needed (arthritis).  Other orders  -     Follow Up In Primary Care - Medicare Annual

## 2024-08-21 ENCOUNTER — LAB (OUTPATIENT)
Dept: LAB | Facility: LAB | Age: 69
End: 2024-08-21
Payer: MEDICARE

## 2024-08-21 DIAGNOSIS — D64.9 ANEMIA, UNSPECIFIED TYPE: ICD-10-CM

## 2024-08-21 DIAGNOSIS — E78.2 MIXED HYPERLIPIDEMIA: ICD-10-CM

## 2024-08-21 DIAGNOSIS — R73.03 PRE-DIABETES: ICD-10-CM

## 2024-08-21 DIAGNOSIS — Z12.5 SCREENING FOR PROSTATE CANCER: ICD-10-CM

## 2024-08-21 DIAGNOSIS — Z13.1 DIABETES MELLITUS SCREENING: ICD-10-CM

## 2024-08-21 LAB
ALBUMIN SERPL BCP-MCNC: 4 G/DL (ref 3.4–5)
ALP SERPL-CCNC: 73 U/L (ref 33–136)
ALT SERPL W P-5'-P-CCNC: 19 U/L (ref 10–52)
ANION GAP SERPL CALC-SCNC: 13 MMOL/L (ref 10–20)
AST SERPL W P-5'-P-CCNC: 20 U/L (ref 9–39)
BILIRUB SERPL-MCNC: 1.1 MG/DL (ref 0–1.2)
BUN SERPL-MCNC: 11 MG/DL (ref 6–23)
CALCIUM SERPL-MCNC: 9.1 MG/DL (ref 8.6–10.3)
CHLORIDE SERPL-SCNC: 102 MMOL/L (ref 98–107)
CHOLEST SERPL-MCNC: 84 MG/DL (ref 0–199)
CHOLESTEROL/HDL RATIO: 2.6
CO2 SERPL-SCNC: 30 MMOL/L (ref 21–32)
CREAT SERPL-MCNC: 0.86 MG/DL (ref 0.5–1.3)
EGFRCR SERPLBLD CKD-EPI 2021: >90 ML/MIN/1.73M*2
EST. AVERAGE GLUCOSE BLD GHB EST-MCNC: 108 MG/DL
FERRITIN SERPL-MCNC: 130 NG/ML (ref 20–300)
GLUCOSE SERPL-MCNC: 92 MG/DL (ref 74–99)
HBA1C MFR BLD: 5.4 %
HDLC SERPL-MCNC: 32.1 MG/DL
IRON SATN MFR SERPL: 21 % (ref 25–45)
IRON SERPL-MCNC: 63 UG/DL (ref 35–150)
LDLC SERPL CALC-MCNC: 27 MG/DL
NON HDL CHOLESTEROL: 52 MG/DL (ref 0–149)
POTASSIUM SERPL-SCNC: 4.2 MMOL/L (ref 3.5–5.3)
PROT SERPL-MCNC: 7 G/DL (ref 6.4–8.2)
PSA SERPL-MCNC: 0.25 NG/ML
SODIUM SERPL-SCNC: 141 MMOL/L (ref 136–145)
TIBC SERPL-MCNC: 306 UG/DL (ref 240–445)
TRIGL SERPL-MCNC: 123 MG/DL (ref 0–149)
UIBC SERPL-MCNC: 243 UG/DL (ref 110–370)
VIT B12 SERPL-MCNC: 730 PG/ML (ref 211–911)
VLDL: 25 MG/DL (ref 0–40)

## 2024-08-21 PROCEDURE — 83550 IRON BINDING TEST: CPT

## 2024-08-21 PROCEDURE — 82607 VITAMIN B-12: CPT

## 2024-08-21 PROCEDURE — G0103 PSA SCREENING: HCPCS

## 2024-08-21 PROCEDURE — 84154 ASSAY OF PSA FREE: CPT

## 2024-08-21 PROCEDURE — 80061 LIPID PANEL: CPT

## 2024-08-21 PROCEDURE — 36415 COLL VENOUS BLD VENIPUNCTURE: CPT

## 2024-08-21 PROCEDURE — 82728 ASSAY OF FERRITIN: CPT

## 2024-08-21 PROCEDURE — 83036 HEMOGLOBIN GLYCOSYLATED A1C: CPT

## 2024-08-21 PROCEDURE — 83540 ASSAY OF IRON: CPT

## 2024-08-21 PROCEDURE — 80053 COMPREHEN METABOLIC PANEL: CPT

## 2024-08-24 LAB
PSA FREE MFR SERPL: 67 %
PSA FREE SERPL-MCNC: 0.2 NG/ML
PSA SERPL IA-MCNC: 0.3 NG/ML (ref 0–4)

## 2024-09-10 ENCOUNTER — HOSPITAL ENCOUNTER (OUTPATIENT)
Dept: CARDIOLOGY | Facility: CLINIC | Age: 69
Discharge: HOME | End: 2024-09-10
Payer: MEDICARE

## 2024-09-10 ENCOUNTER — OFFICE VISIT (OUTPATIENT)
Dept: CARDIOLOGY | Facility: CLINIC | Age: 69
End: 2024-09-10
Payer: MEDICARE

## 2024-09-10 VITALS
SYSTOLIC BLOOD PRESSURE: 153 MMHG | OXYGEN SATURATION: 98 % | DIASTOLIC BLOOD PRESSURE: 81 MMHG | WEIGHT: 216.1 LBS | HEART RATE: 65 BPM | BODY MASS INDEX: 31.91 KG/M2

## 2024-09-10 DIAGNOSIS — I48.91 UNSPECIFIED ATRIAL FIBRILLATION (MULTI): Primary | ICD-10-CM

## 2024-09-10 DIAGNOSIS — I48.91 UNSPECIFIED ATRIAL FIBRILLATION (MULTI): ICD-10-CM

## 2024-09-10 DIAGNOSIS — I10 HYPERTENSION, UNSPECIFIED TYPE: ICD-10-CM

## 2024-09-10 PROCEDURE — 99203 OFFICE O/P NEW LOW 30 MIN: CPT | Performed by: NURSE PRACTITIONER

## 2024-09-10 PROCEDURE — 3044F HG A1C LEVEL LT 7.0%: CPT | Performed by: NURSE PRACTITIONER

## 2024-09-10 PROCEDURE — 1036F TOBACCO NON-USER: CPT | Performed by: NURSE PRACTITIONER

## 2024-09-10 PROCEDURE — 93005 ELECTROCARDIOGRAM TRACING: CPT

## 2024-09-10 PROCEDURE — 3048F LDL-C <100 MG/DL: CPT | Performed by: NURSE PRACTITIONER

## 2024-09-10 PROCEDURE — 1123F ACP DISCUSS/DSCN MKR DOCD: CPT | Performed by: NURSE PRACTITIONER

## 2024-09-10 PROCEDURE — 3077F SYST BP >= 140 MM HG: CPT | Performed by: NURSE PRACTITIONER

## 2024-09-10 PROCEDURE — 99213 OFFICE O/P EST LOW 20 MIN: CPT | Performed by: NURSE PRACTITIONER

## 2024-09-10 PROCEDURE — 4010F ACE/ARB THERAPY RXD/TAKEN: CPT | Performed by: NURSE PRACTITIONER

## 2024-09-10 PROCEDURE — 1157F ADVNC CARE PLAN IN RCRD: CPT | Performed by: NURSE PRACTITIONER

## 2024-09-10 PROCEDURE — 3079F DIAST BP 80-89 MM HG: CPT | Performed by: NURSE PRACTITIONER

## 2024-09-10 PROCEDURE — 1159F MED LIST DOCD IN RCRD: CPT | Performed by: NURSE PRACTITIONER

## 2024-09-10 ASSESSMENT — ENCOUNTER SYMPTOMS
PSYCHIATRIC NEGATIVE: 1
HEMATOLOGIC/LYMPHATIC NEGATIVE: 1
NEUROLOGICAL NEGATIVE: 1
EYES NEGATIVE: 1
DYSPNEA ON EXERTION: 1
RESPIRATORY NEGATIVE: 1
ENDOCRINE NEGATIVE: 1
CONSTITUTIONAL NEGATIVE: 1
MYALGIAS: 1
GASTROINTESTINAL NEGATIVE: 1

## 2024-09-10 NOTE — PATIENT INSTRUCTIONS
CALL WITH ANY QUESTIONS   HEART MONITOR FOR TWO WEEKS   WILL CALL TO REVIEW THE RESULTS   FOLLOW UP IN ONE YEAR   NO MEDICATION CHANGES

## 2024-09-11 LAB
ATRIAL RATE: 60 BPM
P AXIS: 14 DEGREES
P OFFSET: 189 MS
P ONSET: 126 MS
PR INTERVAL: 170 MS
Q ONSET: 211 MS
QRS COUNT: 10 BEATS
QRS DURATION: 100 MS
QT INTERVAL: 452 MS
QTC CALCULATION(BAZETT): 452 MS
QTC FREDERICIA: 452 MS
R AXIS: -49 DEGREES
T AXIS: 32 DEGREES
T OFFSET: 437 MS
VENTRICULAR RATE: 60 BPM

## 2024-09-18 ENCOUNTER — LAB (OUTPATIENT)
Dept: LAB | Facility: LAB | Age: 69
End: 2024-09-18
Payer: MEDICARE

## 2024-09-18 ENCOUNTER — APPOINTMENT (OUTPATIENT)
Dept: PRIMARY CARE | Facility: CLINIC | Age: 69
End: 2024-09-18
Payer: MEDICARE

## 2024-09-18 VITALS — OXYGEN SATURATION: 96 % | HEART RATE: 67 BPM | WEIGHT: 217 LBS | BODY MASS INDEX: 32.14 KG/M2 | HEIGHT: 69 IN

## 2024-09-18 DIAGNOSIS — K59.1 FUNCTIONAL DIARRHEA: ICD-10-CM

## 2024-09-18 DIAGNOSIS — R39.9 UTI SYMPTOMS: ICD-10-CM

## 2024-09-18 DIAGNOSIS — R10.84 GENERALIZED ABDOMINAL PAIN: Primary | ICD-10-CM

## 2024-09-18 DIAGNOSIS — R10.84 GENERALIZED ABDOMINAL PAIN: ICD-10-CM

## 2024-09-18 LAB
ALBUMIN SERPL BCP-MCNC: 4 G/DL (ref 3.4–5)
ALP SERPL-CCNC: 76 U/L (ref 33–136)
ALT SERPL W P-5'-P-CCNC: 23 U/L (ref 10–52)
ANION GAP SERPL CALC-SCNC: 12 MMOL/L (ref 10–20)
AST SERPL W P-5'-P-CCNC: 22 U/L (ref 9–39)
BILIRUB SERPL-MCNC: 0.7 MG/DL (ref 0–1.2)
BUN SERPL-MCNC: 21 MG/DL (ref 6–23)
CALCIUM SERPL-MCNC: 8.9 MG/DL (ref 8.6–10.3)
CHLORIDE SERPL-SCNC: 107 MMOL/L (ref 98–107)
CO2 SERPL-SCNC: 27 MMOL/L (ref 21–32)
CREAT SERPL-MCNC: 0.95 MG/DL (ref 0.5–1.3)
EGFRCR SERPLBLD CKD-EPI 2021: 87 ML/MIN/1.73M*2
GLUCOSE SERPL-MCNC: 111 MG/DL (ref 74–99)
POC APPEARANCE, URINE: CLEAR
POC BILIRUBIN, URINE: NEGATIVE
POC BLOOD, URINE: NEGATIVE
POC COLOR, URINE: YELLOW
POC GLUCOSE, URINE: NEGATIVE MG/DL
POC KETONES, URINE: NEGATIVE MG/DL
POC LEUKOCYTES, URINE: NEGATIVE
POC NITRITE,URINE: NEGATIVE
POC PH, URINE: 6 PH
POC PROTEIN, URINE: NEGATIVE MG/DL
POC SPECIFIC GRAVITY, URINE: 1.02
POC UROBILINOGEN, URINE: 0.2 EU/DL
POTASSIUM SERPL-SCNC: 4 MMOL/L (ref 3.5–5.3)
PROT SERPL-MCNC: 7 G/DL (ref 6.4–8.2)
SODIUM SERPL-SCNC: 142 MMOL/L (ref 136–145)

## 2024-09-18 PROCEDURE — 99214 OFFICE O/P EST MOD 30 MIN: CPT | Performed by: NURSE PRACTITIONER

## 2024-09-18 PROCEDURE — 1159F MED LIST DOCD IN RCRD: CPT | Performed by: NURSE PRACTITIONER

## 2024-09-18 PROCEDURE — 1123F ACP DISCUSS/DSCN MKR DOCD: CPT | Performed by: NURSE PRACTITIONER

## 2024-09-18 PROCEDURE — 87086 URINE CULTURE/COLONY COUNT: CPT

## 2024-09-18 PROCEDURE — 1036F TOBACCO NON-USER: CPT | Performed by: NURSE PRACTITIONER

## 2024-09-18 PROCEDURE — 1157F ADVNC CARE PLAN IN RCRD: CPT | Performed by: NURSE PRACTITIONER

## 2024-09-18 PROCEDURE — 3048F LDL-C <100 MG/DL: CPT | Performed by: NURSE PRACTITIONER

## 2024-09-18 PROCEDURE — 3044F HG A1C LEVEL LT 7.0%: CPT | Performed by: NURSE PRACTITIONER

## 2024-09-18 PROCEDURE — 80053 COMPREHEN METABOLIC PANEL: CPT

## 2024-09-18 PROCEDURE — 4010F ACE/ARB THERAPY RXD/TAKEN: CPT | Performed by: NURSE PRACTITIONER

## 2024-09-18 PROCEDURE — 3008F BODY MASS INDEX DOCD: CPT | Performed by: NURSE PRACTITIONER

## 2024-09-18 PROCEDURE — 36415 COLL VENOUS BLD VENIPUNCTURE: CPT

## 2024-09-18 PROCEDURE — 81003 URINALYSIS AUTO W/O SCOPE: CPT | Performed by: NURSE PRACTITIONER

## 2024-09-18 NOTE — PROGRESS NOTES
"Subjective   Patient ID: Eugenio Bliss is a 68 y.o. male who presents for UTI (Patient mentioned he is here today for possible UTI. Patient is having urgency and pain. Patients blood pressure is 152/81).    68-year-old male accompanied by his son.  Here for acute sick visit.  He states that he has been having increase in urine several times a day.  In office urinalysis was negative.  He is also having loose stools, Strong odor in urine, Having abd pain.   This started 2 weeks ago  No fever  He states the symptoms are similar to what occurred a couple of months back.  During that time stool samples were negative.  Blood work was negative.  It eventually resolved.  The thought was he had gastroenteritis.   He is very unclear on the actual symptoms that he is having in terms of the abdomen.  But he does state that the stools are multiple times a day.  No blood in the stool and the color is brown      UTI          Review of Systems    Objective   Pulse 67   Ht 1.753 m (5' 9\")   Wt 98.4 kg (217 lb)   SpO2 96%   BMI 32.05 kg/m²     Physical Exam  Constitutional:       Appearance: Normal appearance. He is obese.   Cardiovascular:      Rate and Rhythm: Normal rate and regular rhythm.      Pulses: Normal pulses.   Pulmonary:      Effort: Pulmonary effort is normal.      Breath sounds: Normal breath sounds.   Abdominal:      General: Bowel sounds are normal. There is no distension.      Palpations: Abdomen is soft. There is no mass.      Tenderness: There is abdominal tenderness (not consistant on exam.). There is no guarding or rebound.      Hernia: No hernia is present.   Neurological:      General: No focal deficit present.      Mental Status: He is alert and oriented to person, place, and time.   Psychiatric:         Mood and Affect: Mood normal.         Behavior: Behavior normal.         Assessment/Plan   Diagnoses and all orders for this visit:  Generalized abdominal pain  Comments:  Symptoms are very unclear.  " Exam is also very unclear.  He will have abdominal pain with palpation and then the next area palpated no pain  Orders:  -     CT abdomen pelvis wo IV contrast; Future  -     Comprehensive Metabolic Panel; Future  UTI symptoms  Comments:  I do not think this is urologic.  Urinalysis is normal will send urine culture just in case  Orders:  -     POCT UA Automated manually resulted  -     Urine Culture  Functional diarrhea  Comments:  Denies any changes in recent food or medications  Orders:  -     CT abdomen pelvis wo IV contrast; Future  -     Comprehensive Metabolic Panel; Future    Ordered a CT scan of the abdomen since this is reoccurring abdominal pain and discomfort.  I want a make sure there is nothing underlying.  He does have chronic mild anemia.  The loose stools are bothersome.  Could he have a partial bowel obstruction or ileus which could be possible given the pain and loose stools.  All of this was discussed with patient and son

## 2024-09-20 LAB — BACTERIA UR CULT: NO GROWTH

## 2024-10-02 ENCOUNTER — TELEPHONE (OUTPATIENT)
Dept: CARDIOLOGY | Facility: HOSPITAL | Age: 69
End: 2024-10-02
Payer: MEDICARE

## 2024-10-14 NOTE — PROGRESS NOTES
[facility] Heart [facility] Vascular Electrophysiology    Patient Name: Eugenio Bliss  Patient : 1955    Referred for  pAFib    History of Present Illness:  Eugenio Bliss is a 68 y.o. year old male patient with:    AFib  CVA   Chronic tardive dyskinesia  HTN   CKD   RA   Hypothyroidism   Lupus   Anemia   HLD   Chronic back pain     The patient was referred by So Robertson due to history of paroxysmal A-fib and abnormal ZIO.  His event monitor from 2024 showed sinus rhythm with heart rate of 44-67-98 bpm, 2 episodes of NSVT lasting up to 6 beats and 84 nonsustained SVTs lasting up to 20 seconds with an average rate of 111 bpm.  Patient denies feeling palpitations.  He complains of occasional sharp chest pain not related to physical activity.  He is on Eliquis and metoprolol.  His EKG today shows sinus rhythm with left anterior fascicle block versus inferior infarct, heart rate of 65 bpm.    Past Medical History:  He has a past medical history of Abdominal hernia (2023), Achilles tendinitis, right leg, Acute frontal sinusitis, unspecified (2020), Allergic rhinitis (2023), Body mass index (BMI) 39.0-39.9, adult (2021), CHF (congestive heart failure) (Multi), Chondrocostal junction syndrome (tietze) (2013), Contact with and (suspected) exposure to covid-19, COPD (chronic obstructive pulmonary disease) (Multi), Deficiency of other specified B group vitamins (2019), Diabetes mellitus (Multi), Dry eye syndrome of unspecified lacrimal gland (2014), Effusion, right ankle (2018), Encounter for screening for malignant neoplasm of colon (2016), Encounter for screening for malignant neoplasm of prostate (2019), Gallbladder attack (2023), Hypertension (2023), Inguinal hernia (2023), Obesity, unspecified (2022), Other conditions influencing health status (2013), Other conditions influencing health status (2019),  Other conditions influencing health status (04/22/2013), Pain in right ankle and joints of right foot, Pain in unspecified elbow (07/10/2017), Personal history of diseases of the blood and blood-forming organs and certain disorders involving the immune mechanism (12/03/2019), Personal history of diseases of the blood and blood-forming organs and certain disorders involving the immune mechanism (12/03/2019), Personal history of other (healed) physical injury and trauma (04/16/2019), Personal history of other diseases of the digestive system (12/03/2019), Personal history of other diseases of the digestive system (07/08/2022), Personal history of other diseases of the respiratory system, Personal history of other diseases of urinary system (12/03/2019), Personal history of other endocrine, nutritional and metabolic disease (04/22/2013), Personal history of other endocrine, nutritional and metabolic disease (07/29/2016), Personal history of other specified conditions (10/17/2019), Personal history of other specified conditions, Personal history of other specified conditions (09/06/2013), Personal history of other specified conditions (04/22/2013), Personal history of pneumonia (recurrent) (09/04/2020), Pleurodynia (06/05/2020), Pneumonia of left lung due to infectious organism (05/21/2023), Pure hypercholesterolemia, unspecified (11/08/2013), Right knee pain (05/21/2023), Sjogren syndrome, unspecified (Multi), Snoring (05/21/2023), Stiffness of right ankle, not elsewhere classified (06/13/2018), 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    Past Surgical History:  He has a past surgical history that includes Ankle surgery (04/17/2013); Knee surgery (04/17/2013); and Hernia repair (04/17/2013).      Social History:  He reports that he has never smoked. He has never used smokeless tobacco. He reports that he  does not currently use alcohol. He reports that he does not use drugs.    Family History:  No family history on file.     Allergies:  Ciprofloxacin, Levofloxacin, and Penicillins    Outpatient Medications:  Current Outpatient Medications   Medication Instructions    acetaminophen (TYLENOL) 325 mg, oral, 4 times daily    albuterol 90 mcg/actuation inhaler 2 puffs, inhalation, Every 4 hours PRN    allopurinol (ZYLOPRIM) 100 mg, oral, Daily    apixaban (ELIQUIS) 5 mg, oral, 2 times daily    atorvastatin (LIPITOR) 40 mg, oral, Daily    busPIRone (BUSPAR) 10 mg, oral, 2 times daily    cholecalciferol (VITAMIN D-3) 5,000 Units, oral, Daily    cyanocobalamin (VITAMIN B-12) 1,000 mcg, oral, Daily    diclofenac sodium (VOLTAREN) 4 g, Topical, 4 times daily PRN    gabapentin (NEURONTIN) 100 mg, oral, Daily PRN    hydroxychloroquine (PLAQUENIL) 200 mg, oral, Daily    hydrOXYzine HCL (Atarax) 25 mg tablet TAKE 1 TABLET BY MOUTH EVERY DAY AT BEDTIME AS NEEDED    levothyroxine (SYNTHROID, LEVOXYL) 50 mcg, oral, Daily    losartan (COZAAR) 75 mg, oral, Daily    lurasidone (LATUDA) 40 mg, oral, Daily    metoprolol tartrate (LOPRESSOR) 75 mg, oral, 2 times daily    multivitamin tablet 1 tablet, oral, Daily    nebulizer accessories kit 1 kit, miscellaneous, Every 4 hours PRN    oxygen (O2) 2 L/min, inhalation, Continuous PRN    pantoprazole (PROTONIX) 20 mg, oral, Daily, Do not crush, chew, or split.    traZODone (DESYREL) 100 mg, oral, Nightly    vilazodone (Viibryd) 40 mg tablet 1 tablet, oral        ROS:  A 14 point review of systems was done and is negative other than as stated in HPI    Physical Exam  Cardiovascular:      Rate and Rhythm: Normal rate and regular rhythm.      Heart sounds: No murmur heard.     No friction rub. No gallop.   Pulmonary:      Effort: Pulmonary effort is normal.      Breath sounds: Normal breath sounds.   Abdominal:      Palpations: Abdomen is soft.   Neurological:      Mental Status: He is alert.    Psychiatric:         Mood and Affect: Mood normal.         Behavior: Behavior normal.        Vitals:  There were no vitals taken for this visit.       Labs:   CBC  Lab Results   Component Value Date    WBC 4.6 06/13/2024    HGB 12.7 (L) 06/13/2024    HCT 38.8 (L) 06/13/2024    MCV 93 06/13/2024     (L) 06/13/2024        Renal Function Panel  Lab Results   Component Value Date    GLUCOSE 111 (H) 09/18/2024     09/18/2024    K 4.0 09/18/2024     09/18/2024    CO2 27 09/18/2024    ANIONGAP 12 09/18/2024    BUN 21 09/18/2024    CREATININE 0.95 09/18/2024    GFRMALE 71 08/16/2022    CALCIUM 8.9 09/18/2024        CMP  Lab Results   Component Value Date    CALCIUM 8.9 09/18/2024    PHOS 3.1 01/07/2024    PROT 7.0 09/18/2024    ALBUMIN 4.0 09/18/2024    AST 22 09/18/2024    ALT 23 09/18/2024    ALKPHOS 76 09/18/2024    BILITOT 0.7 09/18/2024       TSH  Lab Results   Component Value Date    TSH 3.16 06/05/2024    FREET4 0.98 01/16/2024          Cardiac Testing:  ECG  10/23/2024   sinus rhythm with left anterior fascicle block versus inferior infarct, heart rate of 65 bpm.    Echocardiogram  01/08/2024  CONCLUSIONS:   1. Left ventricular systolic function is normal with a 60-65% estimated ejection fraction.      Assessment:   The patient was referred by So Robertson due to history of paroxysmal A-fib and abnormal ZIO.  His event monitor from September 2024 showed sinus rhythm with heart rate of 44-67-98 bpm, 2 episodes of NSVT lasting up to 6 beats and 84 nonsustained SVTs lasting up to 20 seconds with an average rate of 111 bpm.  Patient denies feeling palpitations.  He complains of occasional sharp chest pain not related to physical activity.  He is on Eliquis and metoprolol.  His EKG today shows sinus rhythm with left anterior fascicle block versus inferior infarct, heart rate of 65 bpm.    Plan:  I had a long conversation with the patient about the findings.  Since the patient denies any  palpitations, we will maintain the same medical therapy.  The patient can follow-up with So Salgado as scheduled.  Will follow-up as needed.

## 2024-10-23 ENCOUNTER — OFFICE VISIT (OUTPATIENT)
Dept: CARDIOLOGY | Facility: CLINIC | Age: 69
End: 2024-10-23
Payer: MEDICARE

## 2024-10-23 VITALS
WEIGHT: 223 LBS | HEART RATE: 65 BPM | HEIGHT: 66 IN | BODY MASS INDEX: 35.84 KG/M2 | DIASTOLIC BLOOD PRESSURE: 80 MMHG | SYSTOLIC BLOOD PRESSURE: 120 MMHG

## 2024-10-23 DIAGNOSIS — I48.0 PAROXYSMAL ATRIAL FIBRILLATION (MULTI): ICD-10-CM

## 2024-10-23 DIAGNOSIS — I47.10 NONSUSTAINED SUPRAVENTRICULAR TACHYCARDIA (CMS-HCC): Primary | ICD-10-CM

## 2024-10-23 PROCEDURE — 99214 OFFICE O/P EST MOD 30 MIN: CPT | Performed by: STUDENT IN AN ORGANIZED HEALTH CARE EDUCATION/TRAINING PROGRAM

## 2024-10-23 PROCEDURE — 93005 ELECTROCARDIOGRAM TRACING: CPT | Performed by: STUDENT IN AN ORGANIZED HEALTH CARE EDUCATION/TRAINING PROGRAM

## 2024-10-23 ASSESSMENT — ENCOUNTER SYMPTOMS
DEPRESSION: 0
LOSS OF SENSATION IN FEET: 0
OCCASIONAL FEELINGS OF UNSTEADINESS: 0

## 2024-10-23 ASSESSMENT — PATIENT HEALTH QUESTIONNAIRE - PHQ9
1. LITTLE INTEREST OR PLEASURE IN DOING THINGS: NOT AT ALL
SUM OF ALL RESPONSES TO PHQ9 QUESTIONS 1 AND 2: 0
2. FEELING DOWN, DEPRESSED OR HOPELESS: NOT AT ALL

## 2024-10-23 ASSESSMENT — PAIN SCALES - GENERAL: PAINLEVEL_OUTOF10: 4

## 2024-11-10 DIAGNOSIS — F33.9 RECURRENT MAJOR DEPRESSIVE DISORDER, REMISSION STATUS UNSPECIFIED (CMS-HCC): ICD-10-CM

## 2024-11-10 DIAGNOSIS — F41.9 ANXIETY DISORDER, UNSPECIFIED TYPE: ICD-10-CM

## 2024-11-11 RX ORDER — LURASIDONE HYDROCHLORIDE 40 MG/1
40 TABLET, FILM COATED ORAL DAILY
Qty: 30 TABLET | Refills: 5 | Status: SHIPPED | OUTPATIENT
Start: 2024-11-11

## 2024-11-18 ENCOUNTER — APPOINTMENT (OUTPATIENT)
Dept: VASCULAR SURGERY | Facility: CLINIC | Age: 69
End: 2024-11-18
Payer: MEDICARE

## 2024-12-08 ENCOUNTER — APPOINTMENT (OUTPATIENT)
Dept: CARDIOLOGY | Facility: HOSPITAL | Age: 69
End: 2024-12-08
Payer: MEDICARE

## 2024-12-08 ENCOUNTER — APPOINTMENT (OUTPATIENT)
Dept: RADIOLOGY | Facility: HOSPITAL | Age: 69
End: 2024-12-08
Payer: MEDICARE

## 2024-12-08 ENCOUNTER — HOSPITAL ENCOUNTER (EMERGENCY)
Facility: HOSPITAL | Age: 69
Discharge: HOME | End: 2024-12-08
Payer: MEDICARE

## 2024-12-08 VITALS
RESPIRATION RATE: 18 BRPM | OXYGEN SATURATION: 95 % | WEIGHT: 223 LBS | SYSTOLIC BLOOD PRESSURE: 135 MMHG | TEMPERATURE: 98.2 F | BODY MASS INDEX: 33.03 KG/M2 | HEIGHT: 69 IN | DIASTOLIC BLOOD PRESSURE: 91 MMHG | HEART RATE: 67 BPM

## 2024-12-08 DIAGNOSIS — J44.1 COPD EXACERBATION (MULTI): Primary | ICD-10-CM

## 2024-12-08 LAB
ALBUMIN SERPL BCP-MCNC: 4 G/DL (ref 3.4–5)
ALP SERPL-CCNC: 67 U/L (ref 33–136)
ALT SERPL W P-5'-P-CCNC: 22 U/L (ref 10–52)
ANION GAP SERPL CALCULATED.3IONS-SCNC: 14 MMOL/L (ref 10–20)
APPEARANCE UR: CLEAR
AST SERPL W P-5'-P-CCNC: 28 U/L (ref 9–39)
BASOPHILS # BLD AUTO: 0.04 X10*3/UL (ref 0–0.1)
BASOPHILS NFR BLD AUTO: 0.9 %
BILIRUB SERPL-MCNC: 0.7 MG/DL (ref 0–1.2)
BILIRUB UR STRIP.AUTO-MCNC: NEGATIVE MG/DL
BNP SERPL-MCNC: 37 PG/ML (ref 0–99)
BUN SERPL-MCNC: 21 MG/DL (ref 6–23)
CALCIUM SERPL-MCNC: 9 MG/DL (ref 8.6–10.3)
CARDIAC TROPONIN I PNL SERPL HS: 6 NG/L (ref 0–20)
CARDIAC TROPONIN I PNL SERPL HS: 7 NG/L (ref 0–20)
CHLORIDE SERPL-SCNC: 103 MMOL/L (ref 98–107)
CO2 SERPL-SCNC: 24 MMOL/L (ref 21–32)
COLOR UR: NORMAL
CREAT SERPL-MCNC: 1.25 MG/DL (ref 0.5–1.3)
EGFRCR SERPLBLD CKD-EPI 2021: 62 ML/MIN/1.73M*2
EOSINOPHIL # BLD AUTO: 0.11 X10*3/UL (ref 0–0.7)
EOSINOPHIL NFR BLD AUTO: 2.6 %
ERYTHROCYTE [DISTWIDTH] IN BLOOD BY AUTOMATED COUNT: 13.2 % (ref 11.5–14.5)
FLUAV RNA RESP QL NAA+PROBE: NOT DETECTED
FLUBV RNA RESP QL NAA+PROBE: NOT DETECTED
GLUCOSE SERPL-MCNC: 113 MG/DL (ref 74–99)
GLUCOSE UR STRIP.AUTO-MCNC: NORMAL MG/DL
HCT VFR BLD AUTO: 40.5 % (ref 41–52)
HGB BLD-MCNC: 14.1 G/DL (ref 13.5–17.5)
IMM GRANULOCYTES # BLD AUTO: 0.03 X10*3/UL (ref 0–0.7)
IMM GRANULOCYTES NFR BLD AUTO: 0.7 % (ref 0–0.9)
KETONES UR STRIP.AUTO-MCNC: NEGATIVE MG/DL
LEUKOCYTE ESTERASE UR QL STRIP.AUTO: NEGATIVE
LYMPHOCYTES # BLD AUTO: 0.71 X10*3/UL (ref 1.2–4.8)
LYMPHOCYTES NFR BLD AUTO: 16.8 %
MCH RBC QN AUTO: 30.5 PG (ref 26–34)
MCHC RBC AUTO-ENTMCNC: 34.8 G/DL (ref 32–36)
MCV RBC AUTO: 88 FL (ref 80–100)
MONOCYTES # BLD AUTO: 0.41 X10*3/UL (ref 0.1–1)
MONOCYTES NFR BLD AUTO: 9.7 %
MUCOUS THREADS #/AREA URNS AUTO: NORMAL /LPF
NEUTROPHILS # BLD AUTO: 2.92 X10*3/UL (ref 1.2–7.7)
NEUTROPHILS NFR BLD AUTO: 69.3 %
NITRITE UR QL STRIP.AUTO: NEGATIVE
NRBC BLD-RTO: 0 /100 WBCS (ref 0–0)
PH UR STRIP.AUTO: 6 [PH]
PLATELET # BLD AUTO: 87 X10*3/UL (ref 150–450)
POTASSIUM SERPL-SCNC: 3.4 MMOL/L (ref 3.5–5.3)
PROT SERPL-MCNC: 7.2 G/DL (ref 6.4–8.2)
PROT UR STRIP.AUTO-MCNC: NORMAL MG/DL
RBC # BLD AUTO: 4.62 X10*6/UL (ref 4.5–5.9)
RBC # UR STRIP.AUTO: NEGATIVE /UL
RBC #/AREA URNS AUTO: NORMAL /HPF
RBC MORPH BLD: NORMAL
SARS-COV-2 RNA RESP QL NAA+PROBE: NOT DETECTED
SODIUM SERPL-SCNC: 138 MMOL/L (ref 136–145)
SP GR UR STRIP.AUTO: 1.02
UROBILINOGEN UR STRIP.AUTO-MCNC: NORMAL MG/DL
WBC # BLD AUTO: 4.2 X10*3/UL (ref 4.4–11.3)
WBC #/AREA URNS AUTO: NORMAL /HPF

## 2024-12-08 PROCEDURE — 80053 COMPREHEN METABOLIC PANEL: CPT

## 2024-12-08 PROCEDURE — 99285 EMERGENCY DEPT VISIT HI MDM: CPT | Mod: 25

## 2024-12-08 PROCEDURE — 93005 ELECTROCARDIOGRAM TRACING: CPT

## 2024-12-08 PROCEDURE — 84484 ASSAY OF TROPONIN QUANT: CPT

## 2024-12-08 PROCEDURE — 71045 X-RAY EXAM CHEST 1 VIEW: CPT | Mod: FOREIGN READ | Performed by: RADIOLOGY

## 2024-12-08 PROCEDURE — 81001 URINALYSIS AUTO W/SCOPE: CPT

## 2024-12-08 PROCEDURE — 83880 ASSAY OF NATRIURETIC PEPTIDE: CPT

## 2024-12-08 PROCEDURE — 87636 SARSCOV2 & INF A&B AMP PRB: CPT

## 2024-12-08 PROCEDURE — 36415 COLL VENOUS BLD VENIPUNCTURE: CPT

## 2024-12-08 PROCEDURE — 71045 X-RAY EXAM CHEST 1 VIEW: CPT

## 2024-12-08 PROCEDURE — 85025 COMPLETE CBC W/AUTO DIFF WBC: CPT

## 2024-12-08 RX ORDER — AZITHROMYCIN 250 MG/1
250 TABLET, FILM COATED ORAL DAILY
Qty: 6 TABLET | Refills: 0 | Status: SHIPPED | OUTPATIENT
Start: 2024-12-08 | End: 2024-12-13

## 2024-12-08 RX ORDER — METHYLPREDNISOLONE 4 MG/1
TABLET ORAL
Qty: 21 TABLET | Refills: 0 | Status: SHIPPED | OUTPATIENT
Start: 2024-12-08 | End: 2024-12-14

## 2024-12-08 ASSESSMENT — LIFESTYLE VARIABLES
EVER FELT BAD OR GUILTY ABOUT YOUR DRINKING: NO
EVER HAD A DRINK FIRST THING IN THE MORNING TO STEADY YOUR NERVES TO GET RID OF A HANGOVER: NO
HAVE YOU EVER FELT YOU SHOULD CUT DOWN ON YOUR DRINKING: NO
TOTAL SCORE: 0
HAVE PEOPLE ANNOYED YOU BY CRITICIZING YOUR DRINKING: NO

## 2024-12-08 ASSESSMENT — PAIN - FUNCTIONAL ASSESSMENT: PAIN_FUNCTIONAL_ASSESSMENT: 0-10

## 2024-12-08 ASSESSMENT — PAIN SCALES - GENERAL: PAINLEVEL_OUTOF10: 0 - NO PAIN

## 2024-12-08 ASSESSMENT — PAIN DESCRIPTION - PROGRESSION: CLINICAL_PROGRESSION: NOT CHANGED

## 2024-12-08 NOTE — ED TRIAGE NOTES
"Ambulated into triage. C/o SOB x10 days. Pt has home O2 as needed, increased use 5L NC. 96% on RA after ambulating in to triage. Pt has appt next week with PCP. Taking gummie THC for depression, recently started. Family requesting urine to be checked \"Smells foul\" .   "
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
## 2024-12-09 LAB
ATRIAL RATE: 70 BPM
HOLD SPECIMEN: NORMAL
P AXIS: 29 DEGREES
P OFFSET: 185 MS
P ONSET: 123 MS
PR INTERVAL: 176 MS
Q ONSET: 211 MS
QRS COUNT: 12 BEATS
QRS DURATION: 110 MS
QT INTERVAL: 430 MS
QTC CALCULATION(BAZETT): 464 MS
QTC FREDERICIA: 452 MS
R AXIS: -44 DEGREES
T AXIS: 26 DEGREES
T OFFSET: 426 MS
VENTRICULAR RATE: 70 BPM

## 2024-12-09 NOTE — DISCHARGE INSTRUCTIONS
Please take antibiotics as prescribed.  Follow the appropriate dosing for Medrol Dosepak.  Follow with your appointment on Tuesday to further discuss this major event that happened in her life.    Be sure to take all medications, over the counter medications or prescription medications only as directed.    Be sure to follow up as directed in 1-2 days. All of the details of your follow up instructions are detailed in the follow up section of this packet.    If you are being discharged with any pains medications or muscle relaxers (norco, Vicodin, hydrocodone products, Percocet, oxycodone products, flexeril, cyclobenzaprine, robaxin, norflex, brand or generic, or any other pain controlling medications with the exception of Ibuprofen and regular Tylenol, do not drive or operate machinery, climb ladders or participate in any activity that could potentially put yourself or others at risk should you get dizzy, or be/feel impaired at all.    Return to emergency room without delay for ANY new or worsening pains or for any other symptoms or concerns. Return with worsening pains, nausea, vomiting, trouble breathing, palpitations, shortness of breath, inability to pass stool or urine, loss of control of stool or urine, any numbness or tingling (that is not normal for you), uncontrolled fevers, the passing of blood or other material in stool or urine, rashes, pains or for any other symptoms or concerns you may have. You are always welcome to return to the ER at any time for any reason or for any other concerns you may have.

## 2024-12-18 ENCOUNTER — LAB (OUTPATIENT)
Dept: LAB | Facility: LAB | Age: 69
End: 2024-12-18
Payer: MEDICARE

## 2024-12-18 ENCOUNTER — APPOINTMENT (OUTPATIENT)
Dept: PRIMARY CARE | Facility: CLINIC | Age: 69
End: 2024-12-18
Payer: MEDICARE

## 2024-12-18 VITALS
HEART RATE: 63 BPM | BODY MASS INDEX: 33.62 KG/M2 | WEIGHT: 227 LBS | SYSTOLIC BLOOD PRESSURE: 157 MMHG | OXYGEN SATURATION: 93 % | HEIGHT: 69 IN | DIASTOLIC BLOOD PRESSURE: 84 MMHG

## 2024-12-18 DIAGNOSIS — M32.9 SYSTEMIC LUPUS ERYTHEMATOSUS, UNSPECIFIED SLE TYPE, UNSPECIFIED ORGAN INVOLVEMENT STATUS (MULTI): ICD-10-CM

## 2024-12-18 DIAGNOSIS — M35.00 SJOGREN'S SYNDROME, WITH UNSPECIFIED ORGAN INVOLVEMENT (MULTI): ICD-10-CM

## 2024-12-18 DIAGNOSIS — E11.9 TYPE 2 DIABETES MELLITUS WITHOUT COMPLICATION, WITHOUT LONG-TERM CURRENT USE OF INSULIN (MULTI): ICD-10-CM

## 2024-12-18 DIAGNOSIS — R06.02 SOB (SHORTNESS OF BREATH): Primary | ICD-10-CM

## 2024-12-18 DIAGNOSIS — M06.9 RHEUMATOID ARTHRITIS INVOLVING MULTIPLE SITES, UNSPECIFIED WHETHER RHEUMATOID FACTOR PRESENT (MULTI): ICD-10-CM

## 2024-12-18 DIAGNOSIS — E03.9 HYPOTHYROIDISM, UNSPECIFIED TYPE: ICD-10-CM

## 2024-12-18 DIAGNOSIS — R39.9 UTI SYMPTOMS: ICD-10-CM

## 2024-12-18 DIAGNOSIS — R06.02 SOB (SHORTNESS OF BREATH): ICD-10-CM

## 2024-12-18 LAB
APPEARANCE UR: CLEAR
BASOPHILS # BLD AUTO: 0.07 X10*3/UL (ref 0–0.1)
BASOPHILS NFR BLD AUTO: 1.1 %
BILIRUB UR STRIP.AUTO-MCNC: NEGATIVE MG/DL
COLOR UR: NORMAL
CRP SERPL-MCNC: 1.44 MG/DL
EOSINOPHIL # BLD AUTO: 0.16 X10*3/UL (ref 0–0.7)
EOSINOPHIL NFR BLD AUTO: 2.4 %
ERYTHROCYTE [DISTWIDTH] IN BLOOD BY AUTOMATED COUNT: 14.3 % (ref 11.5–14.5)
ERYTHROCYTE [SEDIMENTATION RATE] IN BLOOD BY WESTERGREN METHOD: 9 MM/H (ref 0–20)
GLUCOSE UR STRIP.AUTO-MCNC: NORMAL MG/DL
HCT VFR BLD AUTO: 46.3 % (ref 41–52)
HGB BLD-MCNC: 14.9 G/DL (ref 13.5–17.5)
IMM GRANULOCYTES # BLD AUTO: 0.02 X10*3/UL (ref 0–0.7)
IMM GRANULOCYTES NFR BLD AUTO: 0.3 % (ref 0–0.9)
KETONES UR STRIP.AUTO-MCNC: NEGATIVE MG/DL
LEUKOCYTE ESTERASE UR QL STRIP.AUTO: NEGATIVE
LYMPHOCYTES # BLD AUTO: 1.24 X10*3/UL (ref 1.2–4.8)
LYMPHOCYTES NFR BLD AUTO: 18.8 %
MCH RBC QN AUTO: 29.5 PG (ref 26–34)
MCHC RBC AUTO-ENTMCNC: 32.2 G/DL (ref 32–36)
MCV RBC AUTO: 92 FL (ref 80–100)
MONOCYTES # BLD AUTO: 0.71 X10*3/UL (ref 0.1–1)
MONOCYTES NFR BLD AUTO: 10.7 %
NEUTROPHILS # BLD AUTO: 4.41 X10*3/UL (ref 1.2–7.7)
NEUTROPHILS NFR BLD AUTO: 66.7 %
NITRITE UR QL STRIP.AUTO: NEGATIVE
NRBC BLD-RTO: 0 /100 WBCS (ref 0–0)
PH UR STRIP.AUTO: 6.5 [PH]
PLATELET # BLD AUTO: 83 X10*3/UL (ref 150–450)
POC HEMOGLOBIN A1C: 5.9 % (ref 4.2–6.5)
PROT UR STRIP.AUTO-MCNC: NEGATIVE MG/DL
RBC # BLD AUTO: 5.05 X10*6/UL (ref 4.5–5.9)
RBC # UR STRIP.AUTO: NEGATIVE /UL
SP GR UR STRIP.AUTO: 1.01
TSH SERPL-ACNC: 1.91 MIU/L (ref 0.44–3.98)
URATE SERPL-MCNC: 6.4 MG/DL (ref 4–7.5)
UROBILINOGEN UR STRIP.AUTO-MCNC: NORMAL MG/DL
WBC # BLD AUTO: 6.6 X10*3/UL (ref 4.4–11.3)

## 2024-12-18 PROCEDURE — 3048F LDL-C <100 MG/DL: CPT | Performed by: NURSE PRACTITIONER

## 2024-12-18 PROCEDURE — 1157F ADVNC CARE PLAN IN RCRD: CPT | Performed by: NURSE PRACTITIONER

## 2024-12-18 PROCEDURE — 99214 OFFICE O/P EST MOD 30 MIN: CPT | Performed by: NURSE PRACTITIONER

## 2024-12-18 PROCEDURE — 86038 ANTINUCLEAR ANTIBODIES: CPT

## 2024-12-18 PROCEDURE — 3079F DIAST BP 80-89 MM HG: CPT | Performed by: NURSE PRACTITIONER

## 2024-12-18 PROCEDURE — 1123F ACP DISCUSS/DSCN MKR DOCD: CPT | Performed by: NURSE PRACTITIONER

## 2024-12-18 PROCEDURE — 86235 NUCLEAR ANTIGEN ANTIBODY: CPT

## 2024-12-18 PROCEDURE — 3008F BODY MASS INDEX DOCD: CPT | Performed by: NURSE PRACTITIONER

## 2024-12-18 PROCEDURE — 4010F ACE/ARB THERAPY RXD/TAKEN: CPT | Performed by: NURSE PRACTITIONER

## 2024-12-18 PROCEDURE — 84443 ASSAY THYROID STIM HORMONE: CPT

## 2024-12-18 PROCEDURE — 3077F SYST BP >= 140 MM HG: CPT | Performed by: NURSE PRACTITIONER

## 2024-12-18 PROCEDURE — 1159F MED LIST DOCD IN RCRD: CPT | Performed by: NURSE PRACTITIONER

## 2024-12-18 PROCEDURE — 83036 HEMOGLOBIN GLYCOSYLATED A1C: CPT | Performed by: NURSE PRACTITIONER

## 2024-12-18 PROCEDURE — 86140 C-REACTIVE PROTEIN: CPT

## 2024-12-18 PROCEDURE — 81003 URINALYSIS AUTO W/O SCOPE: CPT

## 2024-12-18 PROCEDURE — 1036F TOBACCO NON-USER: CPT | Performed by: NURSE PRACTITIONER

## 2024-12-18 PROCEDURE — 85025 COMPLETE CBC W/AUTO DIFF WBC: CPT

## 2024-12-18 PROCEDURE — 86225 DNA ANTIBODY NATIVE: CPT

## 2024-12-18 PROCEDURE — 3044F HG A1C LEVEL LT 7.0%: CPT | Performed by: NURSE PRACTITIONER

## 2024-12-18 PROCEDURE — 85652 RBC SED RATE AUTOMATED: CPT

## 2024-12-18 PROCEDURE — 84550 ASSAY OF BLOOD/URIC ACID: CPT

## 2024-12-18 PROCEDURE — 36415 COLL VENOUS BLD VENIPUNCTURE: CPT

## 2024-12-18 NOTE — PROGRESS NOTES
"Subjective   Patient ID: Eugenio Bliss is a 69 y.o. male who presents for Follow-up (Patient is here today for a follow up and to check his A1c. ).    69 year old male here with his son. He is here for SOB x 2 weeks. Was recently started on a new psych med by psychiatrist, does not know the name.  Went to the ED for SOB post death of wife.   CXR nml. Labs good. UA neg.   Increased urine with odor. Drinks 2 large glasses water daily  Saw cardio- no change in meds or DX  DX COPD on chronic O2, has not had a PFT, not on meds except albuterol PRN. The Albuterol helps when he has been getting SOB. Agreeable to formal Pulm eval. Has never smoked. Worked at [employer] with [employer] fumes- this gave him the DX COPD  Meds and labs reviewed.   Hypothyroid- due for labs on T4  Chronic anxiety/depression- sees psych and has an upcoming OV. Was Bipolar as an adult  Chronic Lupus, Sjogrens, RA  CT chest 4/2024 without acute cause SOB.        Review of Systems    Objective   /84   Pulse 63   Ht 1.753 m (5' 9\")   Wt 103 kg (227 lb)   SpO2 93%   BMI 33.52 kg/m²     Physical Exam  Constitutional:       Appearance: Normal appearance. He is obese.   Cardiovascular:      Rate and Rhythm: Normal rate and regular rhythm.      Pulses: Normal pulses.      Heart sounds: Normal heart sounds.   Pulmonary:      Effort: Pulmonary effort is normal.      Comments: Mildly diminished RT lung fields. No wheezing, no adventitious sounds  Musculoskeletal:         General: Normal range of motion.   Skin:     General: Skin is warm and dry.   Neurological:      General: No focal deficit present.      Mental Status: He is alert and oriented to person, place, and time. Mental status is at baseline.   Psychiatric:         Mood and Affect: Mood normal.       Assessment/Plan   Diagnoses and all orders for this visit:  SOB (shortness of breath)  Comments:  I am not sure if this is COPD or not he has not had formal testing.  He is agreeable to " pulmonary function test and a pulm evaluation  Orders:  -     Referral to Pulmonology; Future  -     Sedimentation Rate; Future  -     C-reactive protein; Future  -     CHRISTOPHER with Reflex to [facility]; Future  -     Complete Pulmonary Function Test Pre/Post Bronchodilator (Spirometry Pre/Post/DLCO/Lung Volumes); Future  Type 2 diabetes mellitus without complication, without long-term current use of insulin (Multi)  Comments:  Well-controlled hemoglobin A1c  Orders:  -     POCT glycosylated hemoglobin (Hb A1C) manually resulted  UTI symptoms  Comments:  Always has the same symptoms without urinary tract infection.  I am wondering if it is related to autoimmune disease  Orders:  -     CBC and Auto Differential; Future  -     Urinalysis with Reflex Culture and Microscopic; Future  -     Uric acid; Future  Hypothyroidism, unspecified type  Comments:  Update blood work on T4  Orders:  -     TSH with reflex to Free T4 if abnormal; Future  Rheumatoid arthritis involving multiple sites, unspecified whether rheumatoid factor present (Multi)  -     Sedimentation Rate; Future  -     C-reactive protein; Future  -     CHRISTOPHER with Reflex to [facility]; Future  Sjogren's syndrome, with unspecified organ involvement (Multi)  -     Sedimentation Rate; Future  -     C-reactive protein; Future  -     CHRISTOPHER with Reflex to [facility]; Future  Systemic lupus erythematosus, unspecified SLE type, unspecified organ involvement status (Multi)  -     Sedimentation Rate; Future  -     C-reactive protein; Future  -     CHRISTOPHER with Reflex to [facility]; Future  Other orders  -     Follow Up In Primary Care - Established; Future    I reviewed that his multiple autoimmune diseases put him at risk for other organ issues including chronic shortness of breath.  Will update some blood work.  He had a CT scan earlier 2024 which really did not show anything acute.  He is agreeable to the pulmonary function test.  I touched base with pulmonology who I am referring him to    I explained that  this shortness of breath has been an issue for over a year he has had multiple tests he is gone to the emergency room he has had no formal diagnosis or cause it could even be psychological.  But I think he deserves a formal workup of his pulmonary status

## 2024-12-19 LAB
ANA PATTERN: ABNORMAL
ANA SER QL HEP2 SUBST: POSITIVE
ANA TITR SER IF: ABNORMAL {TITER}
CENTROMERE B AB SER-ACNC: <0.2 AI
CHROMATIN AB SERPL-ACNC: <0.2 AI
DSDNA AB SER-ACNC: 1 IU/ML
ENA JO1 AB SER QL IA: <0.2 AI
ENA RNP AB SER IA-ACNC: 0.8 AI
ENA SCL70 AB SER QL IA: <0.2 AI
ENA SM AB SER IA-ACNC: <0.2 AI
ENA SM+RNP AB SER QL IA: <0.2 AI
ENA SS-A AB SER IA-ACNC: >8 AI
ENA SS-B AB SER IA-ACNC: >8 AI
HOLD SPECIMEN: NORMAL
RIBOSOMAL P AB SER-ACNC: <0.2 AI

## 2025-01-23 ENCOUNTER — APPOINTMENT (OUTPATIENT)
Dept: PRIMARY CARE | Facility: CLINIC | Age: 70
End: 2025-01-23
Payer: MEDICARE

## 2025-01-23 VITALS
WEIGHT: 227 LBS | OXYGEN SATURATION: 95 % | HEART RATE: 87 BPM | SYSTOLIC BLOOD PRESSURE: 156 MMHG | HEIGHT: 69 IN | DIASTOLIC BLOOD PRESSURE: 95 MMHG | BODY MASS INDEX: 33.62 KG/M2

## 2025-01-23 DIAGNOSIS — M32.9 SYSTEMIC LUPUS ERYTHEMATOSUS, UNSPECIFIED SLE TYPE, UNSPECIFIED ORGAN INVOLVEMENT STATUS (MULTI): ICD-10-CM

## 2025-01-23 DIAGNOSIS — M06.9 RHEUMATOID ARTHRITIS INVOLVING MULTIPLE SITES, UNSPECIFIED WHETHER RHEUMATOID FACTOR PRESENT (MULTI): Primary | ICD-10-CM

## 2025-01-23 DIAGNOSIS — J40 BRONCHITIS: ICD-10-CM

## 2025-01-23 DIAGNOSIS — M35.00 SJOGREN'S SYNDROME, WITH UNSPECIFIED ORGAN INVOLVEMENT (MULTI): ICD-10-CM

## 2025-01-23 PROCEDURE — 3080F DIAST BP >= 90 MM HG: CPT | Performed by: NURSE PRACTITIONER

## 2025-01-23 PROCEDURE — 1159F MED LIST DOCD IN RCRD: CPT | Performed by: NURSE PRACTITIONER

## 2025-01-23 PROCEDURE — 3008F BODY MASS INDEX DOCD: CPT | Performed by: NURSE PRACTITIONER

## 2025-01-23 PROCEDURE — 1157F ADVNC CARE PLAN IN RCRD: CPT | Performed by: NURSE PRACTITIONER

## 2025-01-23 PROCEDURE — 4010F ACE/ARB THERAPY RXD/TAKEN: CPT | Performed by: NURSE PRACTITIONER

## 2025-01-23 PROCEDURE — 1123F ACP DISCUSS/DSCN MKR DOCD: CPT | Performed by: NURSE PRACTITIONER

## 2025-01-23 PROCEDURE — 99214 OFFICE O/P EST MOD 30 MIN: CPT | Performed by: NURSE PRACTITIONER

## 2025-01-23 PROCEDURE — 3077F SYST BP >= 140 MM HG: CPT | Performed by: NURSE PRACTITIONER

## 2025-01-23 PROCEDURE — 1036F TOBACCO NON-USER: CPT | Performed by: NURSE PRACTITIONER

## 2025-01-23 RX ORDER — AZITHROMYCIN 500 MG/1
500 TABLET, FILM COATED ORAL DAILY
Qty: 5 TABLET | Refills: 0 | Status: SHIPPED | OUTPATIENT
Start: 2025-01-23 | End: 2025-01-28

## 2025-01-23 RX ORDER — PILOCARPINE HYDROCHLORIDE 5 MG/1
5 TABLET, FILM COATED ORAL 2 TIMES DAILY
Qty: 180 TABLET | Refills: 3 | Status: SHIPPED | OUTPATIENT
Start: 2025-01-23

## 2025-01-23 NOTE — PATIENT INSTRUCTIONS
Schedule your appt with cardio your plt are very low. Your are on Eliquis  Increase your water.   Call here Tuesday with update and the name of the pill for your dry mouth

## 2025-01-23 NOTE — PROGRESS NOTES
"Subjective   Patient ID: Eugenio Bliss is a 69 y.o. male who presents for Follow-up (Patient is here today for  a 1 month follow up. Patient feels he has pneumonia with back pain and chest congestion. ).    69 year old male here accompanied by .   Has cough, chest discomfort, fatigue, the cough is worse when eat/drink something hot. NO Sore throat/HA did have nasal congestion. This started about 3 weeks ago. He states it feels like when he had PNA in Dec  Cold air is making the cough worse. Has been trying to stay inside since the weather has been so bad.   No CP. NO pain on deep breath. Not having a diff time breathing.          Review of Systems    Objective   BP (!) 156/95   Pulse 87   Ht 1.753 m (5' 9\")   Wt 103 kg (227 lb)   SpO2 95%   BMI 33.52 kg/m²     Physical Exam  Constitutional:       Appearance: Normal appearance. He is obese.   HENT:      Head: Normocephalic.      Mouth/Throat:      Mouth: Mucous membranes are dry.   Eyes:      Pupils: Pupils are equal, round, and reactive to light.   Cardiovascular:      Rate and Rhythm: Normal rate and regular rhythm.      Pulses: Normal pulses.      Heart sounds: Normal heart sounds.   Pulmonary:      Effort: Pulmonary effort is normal.      Breath sounds: Rales (RT posterior base) present.      Comments: Diminished LT Posterior base  Musculoskeletal:      Cervical back: Normal range of motion.   Neurological:      General: No focal deficit present.      Mental Status: He is alert and oriented to person, place, and time. Mental status is at baseline.   Psychiatric:         Mood and Affect: Mood normal.         Behavior: Behavior normal.         Assessment/Plan   Diagnoses and all orders for this visit:  Rheumatoid arthritis involving multiple sites, unspecified whether rheumatoid factor present (Multi)  -     Referral to Rheumatology; Future  Sjogren's syndrome, with unspecified organ involvement (Multi)  -     Referral to Rheumatology; Future  -     " pilocarpine (Salagen, pilocarpine,) 5 mg tablet; Take 1 tablet (5 mg) by mouth 2 times a day.  Systemic lupus erythematosus, unspecified SLE type, unspecified organ involvement status (Multi)  -     Referral to Rheumatology; Future  Bronchitis  -     azithromycin (Zithromax) 500 mg tablet; Take 1 tablet (500 mg) by mouth once daily for 5 days.  Other orders  -     Follow Up In Primary Care - Established    I wonder if he is getting multiple infections and having issues with pulm is related to his autoimmune disease. He is on plaquenil but that is it

## 2025-01-25 ENCOUNTER — HOSPITAL ENCOUNTER (OUTPATIENT)
Facility: HOSPITAL | Age: 70
Setting detail: OBSERVATION
Discharge: HOME | End: 2025-01-26
Attending: STUDENT IN AN ORGANIZED HEALTH CARE EDUCATION/TRAINING PROGRAM | Admitting: INTERNAL MEDICINE
Payer: MEDICARE

## 2025-01-25 ENCOUNTER — APPOINTMENT (OUTPATIENT)
Dept: RADIOLOGY | Facility: HOSPITAL | Age: 70
End: 2025-01-25
Payer: MEDICARE

## 2025-01-25 ENCOUNTER — APPOINTMENT (OUTPATIENT)
Dept: CARDIOLOGY | Facility: HOSPITAL | Age: 70
End: 2025-01-25
Payer: MEDICARE

## 2025-01-25 DIAGNOSIS — R07.9 CHEST PAIN, UNSPECIFIED TYPE: Primary | ICD-10-CM

## 2025-01-25 LAB
ALBUMIN SERPL BCP-MCNC: 4.2 G/DL (ref 3.4–5)
ALP SERPL-CCNC: 70 U/L (ref 33–136)
ALT SERPL W P-5'-P-CCNC: 14 U/L (ref 10–52)
ANION GAP SERPL CALCULATED.3IONS-SCNC: 14 MMOL/L (ref 10–20)
AST SERPL W P-5'-P-CCNC: 17 U/L (ref 9–39)
BASOPHILS # BLD AUTO: 0.02 X10*3/UL (ref 0–0.1)
BASOPHILS NFR BLD AUTO: 0.5 %
BILIRUB SERPL-MCNC: 1 MG/DL (ref 0–1.2)
BNP SERPL-MCNC: 15 PG/ML (ref 0–99)
BUN SERPL-MCNC: 14 MG/DL (ref 6–23)
CALCIUM SERPL-MCNC: 9.3 MG/DL (ref 8.6–10.3)
CARDIAC TROPONIN I PNL SERPL HS: 5 NG/L (ref 0–20)
CARDIAC TROPONIN I PNL SERPL HS: 5 NG/L (ref 0–20)
CHLORIDE SERPL-SCNC: 102 MMOL/L (ref 98–107)
CO2 SERPL-SCNC: 24 MMOL/L (ref 21–32)
CREAT SERPL-MCNC: 0.9 MG/DL (ref 0.5–1.3)
D DIMER PPP FEU-MCNC: 0.69 MG/L FEU (ref 0.19–0.5)
EGFRCR SERPLBLD CKD-EPI 2021: >90 ML/MIN/1.73M*2
EOSINOPHIL # BLD AUTO: 0.07 X10*3/UL (ref 0–0.7)
EOSINOPHIL NFR BLD AUTO: 1.7 %
ERYTHROCYTE [DISTWIDTH] IN BLOOD BY AUTOMATED COUNT: 14.7 % (ref 11.5–14.5)
FLUAV RNA RESP QL NAA+PROBE: NOT DETECTED
FLUBV RNA RESP QL NAA+PROBE: NOT DETECTED
GLUCOSE SERPL-MCNC: 149 MG/DL (ref 74–99)
HCT VFR BLD AUTO: 44.8 % (ref 41–52)
HGB BLD-MCNC: 15.2 G/DL (ref 13.5–17.5)
IMM GRANULOCYTES # BLD AUTO: 0.01 X10*3/UL (ref 0–0.7)
IMM GRANULOCYTES NFR BLD AUTO: 0.2 % (ref 0–0.9)
LACTATE SERPL-SCNC: 1.9 MMOL/L (ref 0.4–2)
LYMPHOCYTES # BLD AUTO: 0.63 X10*3/UL (ref 1.2–4.8)
LYMPHOCYTES NFR BLD AUTO: 15.3 %
MCH RBC QN AUTO: 29.8 PG (ref 26–34)
MCHC RBC AUTO-ENTMCNC: 33.9 G/DL (ref 32–36)
MCV RBC AUTO: 88 FL (ref 80–100)
MONOCYTES # BLD AUTO: 0.29 X10*3/UL (ref 0.1–1)
MONOCYTES NFR BLD AUTO: 7 %
NEUTROPHILS # BLD AUTO: 3.11 X10*3/UL (ref 1.2–7.7)
NEUTROPHILS NFR BLD AUTO: 75.3 %
NRBC BLD-RTO: 0 /100 WBCS (ref 0–0)
PLATELET # BLD AUTO: 111 X10*3/UL (ref 150–450)
POTASSIUM SERPL-SCNC: 3.9 MMOL/L (ref 3.5–5.3)
PROT SERPL-MCNC: 7.7 G/DL (ref 6.4–8.2)
RBC # BLD AUTO: 5.1 X10*6/UL (ref 4.5–5.9)
SARS-COV-2 RNA RESP QL NAA+PROBE: NOT DETECTED
SODIUM SERPL-SCNC: 136 MMOL/L (ref 136–145)
WBC # BLD AUTO: 4.1 X10*3/UL (ref 4.4–11.3)

## 2025-01-25 PROCEDURE — 99285 EMERGENCY DEPT VISIT HI MDM: CPT | Mod: 25 | Performed by: STUDENT IN AN ORGANIZED HEALTH CARE EDUCATION/TRAINING PROGRAM

## 2025-01-25 PROCEDURE — 93005 ELECTROCARDIOGRAM TRACING: CPT

## 2025-01-25 PROCEDURE — 80053 COMPREHEN METABOLIC PANEL: CPT

## 2025-01-25 PROCEDURE — G0378 HOSPITAL OBSERVATION PER HR: HCPCS

## 2025-01-25 PROCEDURE — 85300 ANTITHROMBIN III ACTIVITY: CPT

## 2025-01-25 PROCEDURE — 96361 HYDRATE IV INFUSION ADD-ON: CPT

## 2025-01-25 PROCEDURE — 2500000004 HC RX 250 GENERAL PHARMACY W/ HCPCS (ALT 636 FOR OP/ED)

## 2025-01-25 PROCEDURE — 84484 ASSAY OF TROPONIN QUANT: CPT

## 2025-01-25 PROCEDURE — 85025 COMPLETE CBC W/AUTO DIFF WBC: CPT

## 2025-01-25 PROCEDURE — 93010 ELECTROCARDIOGRAM REPORT: CPT | Performed by: INTERNAL MEDICINE

## 2025-01-25 PROCEDURE — 2500000002 HC RX 250 W HCPCS SELF ADMINISTERED DRUGS (ALT 637 FOR MEDICARE OP, ALT 636 FOR OP/ED): Mod: MUE | Performed by: INTERNAL MEDICINE

## 2025-01-25 PROCEDURE — 87636 SARSCOV2 & INF A&B AMP PRB: CPT

## 2025-01-25 PROCEDURE — 83605 ASSAY OF LACTIC ACID: CPT

## 2025-01-25 PROCEDURE — 71275 CT ANGIOGRAPHY CHEST: CPT | Mod: FOREIGN READ | Performed by: RADIOLOGY

## 2025-01-25 PROCEDURE — 71275 CT ANGIOGRAPHY CHEST: CPT

## 2025-01-25 PROCEDURE — 2500000001 HC RX 250 WO HCPCS SELF ADMINISTERED DRUGS (ALT 637 FOR MEDICARE OP): Performed by: INTERNAL MEDICINE

## 2025-01-25 PROCEDURE — 2550000001 HC RX 255 CONTRASTS: Performed by: STUDENT IN AN ORGANIZED HEALTH CARE EDUCATION/TRAINING PROGRAM

## 2025-01-25 PROCEDURE — 71046 X-RAY EXAM CHEST 2 VIEWS: CPT

## 2025-01-25 PROCEDURE — 96374 THER/PROPH/DIAG INJ IV PUSH: CPT | Mod: 59

## 2025-01-25 PROCEDURE — 83880 ASSAY OF NATRIURETIC PEPTIDE: CPT

## 2025-01-25 PROCEDURE — 2500000001 HC RX 250 WO HCPCS SELF ADMINISTERED DRUGS (ALT 637 FOR MEDICARE OP)

## 2025-01-25 PROCEDURE — 36415 COLL VENOUS BLD VENIPUNCTURE: CPT

## 2025-01-25 PROCEDURE — 71046 X-RAY EXAM CHEST 2 VIEWS: CPT | Mod: FOREIGN READ | Performed by: RADIOLOGY

## 2025-01-25 RX ORDER — MORPHINE SULFATE 2 MG/ML
2 INJECTION, SOLUTION INTRAMUSCULAR; INTRAVENOUS ONCE
Status: COMPLETED | OUTPATIENT
Start: 2025-01-25 | End: 2025-01-25

## 2025-01-25 RX ORDER — VILAZODONE HYDROCHLORIDE 20 MG/1
40 TABLET ORAL
Status: DISCONTINUED | OUTPATIENT
Start: 2025-01-26 | End: 2025-01-26 | Stop reason: HOSPADM

## 2025-01-25 RX ORDER — GABAPENTIN 100 MG/1
100 CAPSULE ORAL DAILY PRN
Status: DISCONTINUED | OUTPATIENT
Start: 2025-01-25 | End: 2025-01-26 | Stop reason: HOSPADM

## 2025-01-25 RX ORDER — LEVOTHYROXINE SODIUM 50 UG/1
50 TABLET ORAL DAILY
Status: DISCONTINUED | OUTPATIENT
Start: 2025-01-25 | End: 2025-01-26 | Stop reason: HOSPADM

## 2025-01-25 RX ORDER — PILOCARPINE HYDROCHLORIDE 5 MG/1
5 TABLET, FILM COATED ORAL 2 TIMES DAILY
Status: DISCONTINUED | OUTPATIENT
Start: 2025-01-25 | End: 2025-01-26 | Stop reason: HOSPADM

## 2025-01-25 RX ORDER — ALBUTEROL SULFATE 0.83 MG/ML
3 SOLUTION RESPIRATORY (INHALATION) EVERY 4 HOURS PRN
Status: DISCONTINUED | OUTPATIENT
Start: 2025-01-25 | End: 2025-01-26 | Stop reason: HOSPADM

## 2025-01-25 RX ORDER — HYDROXYZINE HYDROCHLORIDE 25 MG/1
25 TABLET, FILM COATED ORAL 4 TIMES DAILY
Status: DISCONTINUED | OUTPATIENT
Start: 2025-01-25 | End: 2025-01-26 | Stop reason: HOSPADM

## 2025-01-25 RX ORDER — LANOLIN ALCOHOL/MO/W.PET/CERES
1000 CREAM (GRAM) TOPICAL DAILY
Status: DISCONTINUED | OUTPATIENT
Start: 2025-01-25 | End: 2025-01-26 | Stop reason: HOSPADM

## 2025-01-25 RX ORDER — HYDROXYCHLOROQUINE SULFATE 200 MG/1
200 TABLET, FILM COATED ORAL DAILY
Status: DISCONTINUED | OUTPATIENT
Start: 2025-01-25 | End: 2025-01-26 | Stop reason: HOSPADM

## 2025-01-25 RX ORDER — ACETAMINOPHEN 325 MG/1
975 TABLET ORAL ONCE
Status: COMPLETED | OUTPATIENT
Start: 2025-01-25 | End: 2025-01-25

## 2025-01-25 RX ORDER — DICLOFENAC SODIUM 10 MG/G
4 GEL TOPICAL 4 TIMES DAILY PRN
Status: DISCONTINUED | OUTPATIENT
Start: 2025-01-25 | End: 2025-01-26 | Stop reason: HOSPADM

## 2025-01-25 RX ORDER — DOCUSATE SODIUM 100 MG/1
100 CAPSULE, LIQUID FILLED ORAL 2 TIMES DAILY
Status: DISCONTINUED | OUTPATIENT
Start: 2025-01-25 | End: 2025-01-26 | Stop reason: HOSPADM

## 2025-01-25 RX ORDER — ACETAMINOPHEN 325 MG/1
325 TABLET ORAL 4 TIMES DAILY
Status: DISCONTINUED | OUTPATIENT
Start: 2025-01-25 | End: 2025-01-26 | Stop reason: HOSPADM

## 2025-01-25 RX ORDER — ALLOPURINOL 100 MG/1
100 TABLET ORAL DAILY
Status: DISCONTINUED | OUTPATIENT
Start: 2025-01-25 | End: 2025-01-26 | Stop reason: HOSPADM

## 2025-01-25 RX ORDER — TRAZODONE HYDROCHLORIDE 100 MG/1
300 TABLET ORAL NIGHTLY
Status: DISCONTINUED | OUTPATIENT
Start: 2025-01-25 | End: 2025-01-26 | Stop reason: HOSPADM

## 2025-01-25 RX ORDER — LURASIDONE HYDROCHLORIDE 40 MG/1
40 TABLET, FILM COATED ORAL DAILY
Status: DISCONTINUED | OUTPATIENT
Start: 2025-01-25 | End: 2025-01-26 | Stop reason: HOSPADM

## 2025-01-25 RX ORDER — PANTOPRAZOLE SODIUM 20 MG/1
20 TABLET, DELAYED RELEASE ORAL DAILY
Status: DISCONTINUED | OUTPATIENT
Start: 2025-01-25 | End: 2025-01-26 | Stop reason: HOSPADM

## 2025-01-25 RX ORDER — AZITHROMYCIN 500 MG/1
500 TABLET, FILM COATED ORAL DAILY
Status: DISCONTINUED | OUTPATIENT
Start: 2025-01-25 | End: 2025-01-26 | Stop reason: HOSPADM

## 2025-01-25 RX ORDER — BUSPIRONE HYDROCHLORIDE 10 MG/1
10 TABLET ORAL 2 TIMES DAILY
Status: DISCONTINUED | OUTPATIENT
Start: 2025-01-25 | End: 2025-01-26 | Stop reason: HOSPADM

## 2025-01-25 RX ORDER — ATORVASTATIN CALCIUM 40 MG/1
40 TABLET, FILM COATED ORAL DAILY
Status: DISCONTINUED | OUTPATIENT
Start: 2025-01-25 | End: 2025-01-26 | Stop reason: HOSPADM

## 2025-01-25 RX ADMIN — MORPHINE SULFATE 2 MG: 2 INJECTION, SOLUTION INTRAMUSCULAR; INTRAVENOUS at 13:25

## 2025-01-25 RX ADMIN — GABAPENTIN 100 MG: 100 CAPSULE ORAL at 19:15

## 2025-01-25 RX ADMIN — SODIUM CHLORIDE 500 ML: 900 INJECTION, SOLUTION INTRAVENOUS at 12:06

## 2025-01-25 RX ADMIN — HYDROXYZINE HYDROCHLORIDE 25 MG: 25 TABLET, FILM COATED ORAL at 21:21

## 2025-01-25 RX ADMIN — PILOCARPINE HYDROCHLORIDE 5 MG: 5 TABLET, FILM COATED ORAL at 21:20

## 2025-01-25 RX ADMIN — APIXABAN 5 MG: 5 TABLET, FILM COATED ORAL at 21:20

## 2025-01-25 RX ADMIN — ACETAMINOPHEN 325 MG: 325 TABLET ORAL at 21:21

## 2025-01-25 RX ADMIN — ACETAMINOPHEN 975 MG: 325 TABLET ORAL at 11:53

## 2025-01-25 RX ADMIN — METOPROLOL TARTRATE 75 MG: 50 TABLET, FILM COATED ORAL at 21:20

## 2025-01-25 RX ADMIN — TRAZODONE HYDROCHLORIDE 300 MG: 100 TABLET ORAL at 21:20

## 2025-01-25 RX ADMIN — IOHEXOL 75 ML: 350 INJECTION, SOLUTION INTRAVENOUS at 13:31

## 2025-01-25 RX ADMIN — BUSPIRONE HYDROCHLORIDE 10 MG: 10 TABLET ORAL at 21:21

## 2025-01-25 RX ADMIN — AZITHROMYCIN DIHYDRATE 500 MG: 500 TABLET ORAL at 21:20

## 2025-01-25 RX ADMIN — DOCUSATE SODIUM 100 MG: 100 CAPSULE, LIQUID FILLED ORAL at 21:20

## 2025-01-25 SDOH — HEALTH STABILITY: PHYSICAL HEALTH
HOW OFTEN DO YOU NEED TO HAVE SOMEONE HELP YOU WHEN YOU READ INSTRUCTIONS, PAMPHLETS, OR OTHER WRITTEN MATERIAL FROM YOUR DOCTOR OR PHARMACY?: RARELY

## 2025-01-25 SDOH — ECONOMIC STABILITY: INCOME INSECURITY: IN THE PAST 12 MONTHS HAS THE ELECTRIC, GAS, OIL, OR WATER COMPANY THREATENED TO SHUT OFF SERVICES IN YOUR HOME?: NO

## 2025-01-25 SDOH — ECONOMIC STABILITY: FOOD INSECURITY: WITHIN THE PAST 12 MONTHS, THE FOOD YOU BOUGHT JUST DIDN'T LAST AND YOU DIDN'T HAVE MONEY TO GET MORE.: NEVER TRUE

## 2025-01-25 SDOH — SOCIAL STABILITY: SOCIAL INSECURITY: ARE YOU OR HAVE YOU BEEN THREATENED OR ABUSED PHYSICALLY, EMOTIONALLY, OR SEXUALLY BY ANYONE?: NO

## 2025-01-25 SDOH — ECONOMIC STABILITY: FOOD INSECURITY: WITHIN THE PAST 12 MONTHS, YOU WORRIED THAT YOUR FOOD WOULD RUN OUT BEFORE YOU GOT THE MONEY TO BUY MORE.: NEVER TRUE

## 2025-01-25 SDOH — SOCIAL STABILITY: SOCIAL INSECURITY
WITHIN THE LAST YEAR, HAVE YOU BEEN RAPED OR FORCED TO HAVE ANY KIND OF SEXUAL ACTIVITY BY YOUR PARTNER OR EX-PARTNER?: NO

## 2025-01-25 SDOH — ECONOMIC STABILITY: HOUSING INSECURITY: IN THE PAST 12 MONTHS, HOW MANY TIMES HAVE YOU MOVED WHERE YOU WERE LIVING?: 0

## 2025-01-25 SDOH — HEALTH STABILITY: MENTAL HEALTH
DO YOU FEEL STRESS - TENSE, RESTLESS, NERVOUS, OR ANXIOUS, OR UNABLE TO SLEEP AT NIGHT BECAUSE YOUR MIND IS TROUBLED ALL THE TIME - THESE DAYS?: NOT AT ALL

## 2025-01-25 SDOH — HEALTH STABILITY: MENTAL HEALTH: HOW OFTEN DO YOU HAVE SIX OR MORE DRINKS ON ONE OCCASION?: NEVER

## 2025-01-25 SDOH — SOCIAL STABILITY: SOCIAL INSECURITY: DO YOU FEEL UNSAFE GOING BACK TO THE PLACE WHERE YOU ARE LIVING?: NO

## 2025-01-25 SDOH — SOCIAL STABILITY: SOCIAL NETWORK: HOW OFTEN DO YOU ATTEND CHURCH OR RELIGIOUS SERVICES?: NEVER

## 2025-01-25 SDOH — SOCIAL STABILITY: SOCIAL NETWORK: HOW OFTEN DO YOU ATTEND MEETINGS OF THE CLUBS OR ORGANIZATIONS YOU BELONG TO?: NEVER

## 2025-01-25 SDOH — HEALTH STABILITY: MENTAL HEALTH: HOW MANY DRINKS CONTAINING ALCOHOL DO YOU HAVE ON A TYPICAL DAY WHEN YOU ARE DRINKING?: PATIENT DOES NOT DRINK

## 2025-01-25 SDOH — ECONOMIC STABILITY: HOUSING INSECURITY: IN THE LAST 12 MONTHS, WAS THERE A TIME WHEN YOU WERE NOT ABLE TO PAY THE MORTGAGE OR RENT ON TIME?: NO

## 2025-01-25 SDOH — SOCIAL STABILITY: SOCIAL NETWORK
DO YOU BELONG TO ANY CLUBS OR ORGANIZATIONS SUCH AS CHURCH GROUPS, UNIONS, FRATERNAL OR ATHLETIC GROUPS, OR SCHOOL GROUPS?: NO

## 2025-01-25 SDOH — SOCIAL STABILITY: SOCIAL INSECURITY: WITHIN THE LAST YEAR, HAVE YOU BEEN HUMILIATED OR EMOTIONALLY ABUSED IN OTHER WAYS BY YOUR PARTNER OR EX-PARTNER?: NO

## 2025-01-25 SDOH — SOCIAL STABILITY: SOCIAL INSECURITY: ARE YOU MARRIED, WIDOWED, DIVORCED, SEPARATED, NEVER MARRIED, OR LIVING WITH A PARTNER?: WIDOWED

## 2025-01-25 SDOH — SOCIAL STABILITY: SOCIAL INSECURITY: WITHIN THE LAST YEAR, HAVE YOU BEEN AFRAID OF YOUR PARTNER OR EX-PARTNER?: NO

## 2025-01-25 SDOH — SOCIAL STABILITY: SOCIAL INSECURITY: ARE THERE ANY APPARENT SIGNS OF INJURIES/BEHAVIORS THAT COULD BE RELATED TO ABUSE/NEGLECT?: NO

## 2025-01-25 SDOH — SOCIAL STABILITY: SOCIAL NETWORK: IN A TYPICAL WEEK, HOW MANY TIMES DO YOU TALK ON THE PHONE WITH FAMILY, FRIENDS, OR NEIGHBORS?: TWICE A WEEK

## 2025-01-25 SDOH — SOCIAL STABILITY: SOCIAL INSECURITY: DOES ANYONE TRY TO KEEP YOU FROM HAVING/CONTACTING OTHER FRIENDS OR DOING THINGS OUTSIDE YOUR HOME?: NO

## 2025-01-25 SDOH — SOCIAL STABILITY: SOCIAL INSECURITY: WERE YOU ABLE TO COMPLETE ALL THE BEHAVIORAL HEALTH SCREENINGS?: YES

## 2025-01-25 SDOH — SOCIAL STABILITY: SOCIAL INSECURITY: DO YOU FEEL ANYONE HAS EXPLOITED OR TAKEN ADVANTAGE OF YOU FINANCIALLY OR OF YOUR PERSONAL PROPERTY?: NO

## 2025-01-25 SDOH — HEALTH STABILITY: MENTAL HEALTH: HOW OFTEN DO YOU HAVE A DRINK CONTAINING ALCOHOL?: NEVER

## 2025-01-25 SDOH — ECONOMIC STABILITY: FOOD INSECURITY: HOW HARD IS IT FOR YOU TO PAY FOR THE VERY BASICS LIKE FOOD, HOUSING, MEDICAL CARE, AND HEATING?: NOT HARD AT ALL

## 2025-01-25 SDOH — ECONOMIC STABILITY: HOUSING INSECURITY: AT ANY TIME IN THE PAST 12 MONTHS, WERE YOU HOMELESS OR LIVING IN A SHELTER (INCLUDING NOW)?: NO

## 2025-01-25 SDOH — HEALTH STABILITY: PHYSICAL HEALTH: ON AVERAGE, HOW MANY DAYS PER WEEK DO YOU ENGAGE IN MODERATE TO STRENUOUS EXERCISE (LIKE A BRISK WALK)?: 0 DAYS

## 2025-01-25 SDOH — SOCIAL STABILITY: SOCIAL NETWORK: HOW OFTEN DO YOU GET TOGETHER WITH FRIENDS OR RELATIVES?: ONCE A WEEK

## 2025-01-25 SDOH — SOCIAL STABILITY: SOCIAL INSECURITY: HAVE YOU HAD ANY THOUGHTS OF HARMING ANYONE ELSE?: NO

## 2025-01-25 SDOH — ECONOMIC STABILITY: TRANSPORTATION INSECURITY: IN THE PAST 12 MONTHS, HAS LACK OF TRANSPORTATION KEPT YOU FROM MEDICAL APPOINTMENTS OR FROM GETTING MEDICATIONS?: NO

## 2025-01-25 SDOH — SOCIAL STABILITY: SOCIAL INSECURITY: HAS ANYONE EVER THREATENED TO HURT YOUR FAMILY OR YOUR PETS?: NO

## 2025-01-25 SDOH — SOCIAL STABILITY: SOCIAL INSECURITY: ABUSE: ADULT

## 2025-01-25 SDOH — SOCIAL STABILITY: SOCIAL INSECURITY: HAVE YOU HAD THOUGHTS OF HARMING ANYONE ELSE?: NO

## 2025-01-25 SDOH — HEALTH STABILITY: PHYSICAL HEALTH: ON AVERAGE, HOW MANY MINUTES DO YOU ENGAGE IN EXERCISE AT THIS LEVEL?: 0 MIN

## 2025-01-25 ASSESSMENT — PATIENT HEALTH QUESTIONNAIRE - PHQ9
SUM OF ALL RESPONSES TO PHQ9 QUESTIONS 1 & 2: 0
2. FEELING DOWN, DEPRESSED OR HOPELESS: NOT AT ALL
1. LITTLE INTEREST OR PLEASURE IN DOING THINGS: NOT AT ALL

## 2025-01-25 ASSESSMENT — LIFESTYLE VARIABLES
HAVE PEOPLE ANNOYED YOU BY CRITICIZING YOUR DRINKING: NO
HAVE YOU EVER FELT YOU SHOULD CUT DOWN ON YOUR DRINKING: NO
SKIP TO QUESTIONS 9-10: 1
PRESCIPTION_ABUSE_PAST_12_MONTHS: NO
AUDIT-C TOTAL SCORE: 0
SUBSTANCE_ABUSE_PAST_12_MONTHS: NO
EVER FELT BAD OR GUILTY ABOUT YOUR DRINKING: NO
AUDIT-C TOTAL SCORE: 0
HOW MANY STANDARD DRINKS CONTAINING ALCOHOL DO YOU HAVE ON A TYPICAL DAY: PATIENT DOES NOT DRINK
HOW OFTEN DO YOU HAVE A DRINK CONTAINING ALCOHOL: NEVER
HOW OFTEN DO YOU HAVE 6 OR MORE DRINKS ON ONE OCCASION: NEVER
EVER HAD A DRINK FIRST THING IN THE MORNING TO STEADY YOUR NERVES TO GET RID OF A HANGOVER: NO
AUDIT-C TOTAL SCORE: 0
SKIP TO QUESTIONS 9-10: 1
TOTAL SCORE: 0

## 2025-01-25 ASSESSMENT — COGNITIVE AND FUNCTIONAL STATUS - GENERAL
MOBILITY SCORE: 24
DRESSING REGULAR LOWER BODY CLOTHING: A LITTLE
DAILY ACTIVITIY SCORE: 23
PATIENT BASELINE BEDBOUND: NO
DRESSING REGULAR LOWER BODY CLOTHING: A LITTLE
MOBILITY SCORE: 24
DAILY ACTIVITIY SCORE: 23

## 2025-01-25 ASSESSMENT — ACTIVITIES OF DAILY LIVING (ADL)
WALKS IN HOME: NEEDS ASSISTANCE
FEEDING YOURSELF: INDEPENDENT
DRESSING YOURSELF: INDEPENDENT
HEARING - RIGHT EAR: FUNCTIONAL
GROOMING: INDEPENDENT
PATIENT'S MEMORY ADEQUATE TO SAFELY COMPLETE DAILY ACTIVITIES?: YES
LACK_OF_TRANSPORTATION: NO
TOILETING: INDEPENDENT
ADEQUATE_TO_COMPLETE_ADL: YES
HEARING - LEFT EAR: FUNCTIONAL
JUDGMENT_ADEQUATE_SAFELY_COMPLETE_DAILY_ACTIVITIES: YES
BATHING: INDEPENDENT

## 2025-01-25 ASSESSMENT — PAIN DESCRIPTION - LOCATION: LOCATION: CHEST

## 2025-01-25 ASSESSMENT — HEART SCORE
RISK FACTORS: >2 RISK FACTORS OR HX OF ATHEROSCLEROTIC DISEASE
AGE: 65+
HISTORY: SLIGHTLY SUSPICIOUS
TROPONIN: LESS THAN OR EQUAL TO NORMAL LIMIT
ECG: NON-SPECIFIC REPOLARIZATION DISTURBANCE
HEART SCORE: 5

## 2025-01-25 ASSESSMENT — PAIN SCALES - GENERAL
PAINLEVEL_OUTOF10: 8
PAINLEVEL_OUTOF10: 5 - MODERATE PAIN
PAINLEVEL_OUTOF10: 8
PAINLEVEL_OUTOF10: 8

## 2025-01-25 ASSESSMENT — COLUMBIA-SUICIDE SEVERITY RATING SCALE - C-SSRS
6. HAVE YOU EVER DONE ANYTHING, STARTED TO DO ANYTHING, OR PREPARED TO DO ANYTHING TO END YOUR LIFE?: NO
1. IN THE PAST MONTH, HAVE YOU WISHED YOU WERE DEAD OR WISHED YOU COULD GO TO SLEEP AND NOT WAKE UP?: NO
2. HAVE YOU ACTUALLY HAD ANY THOUGHTS OF KILLING YOURSELF?: NO
1. IN THE PAST MONTH, HAVE YOU WISHED YOU WERE DEAD OR WISHED YOU COULD GO TO SLEEP AND NOT WAKE UP?: NO
6. HAVE YOU EVER DONE ANYTHING, STARTED TO DO ANYTHING, OR PREPARED TO DO ANYTHING TO END YOUR LIFE?: NO

## 2025-01-25 ASSESSMENT — PAIN - FUNCTIONAL ASSESSMENT
PAIN_FUNCTIONAL_ASSESSMENT: 0-10
PAIN_FUNCTIONAL_ASSESSMENT: 0-10

## 2025-01-25 NOTE — PROGRESS NOTES
Eugenio Bliss is a 69 y.o. male on day 0 of admission presenting with Chest pain, unspecified type.       01/25/25 1702   Physical Activity   On average, how many days per week do you engage in moderate to strenuous exercise (like a brisk walk)? 0 days   On average, how many minutes do you engage in exercise at this level? 0 min   Financial Resource Strain   How hard is it for you to pay for the very basics like food, housing, medical care, and heating? Not hard   Housing Stability   In the last 12 months, was there a time when you were not able to pay the mortgage or rent on time? N   In the past 12 months, how many times have you moved where you were living? 0   At any time in the past 12 months, were you homeless or living in a shelter (including now)? N   Transportation Needs   In the past 12 months, has lack of transportation kept you from medical appointments or from getting medications? no   In the past 12 months, has lack of transportation kept you from meetings, work, or from getting things needed for daily living? No   Food Insecurity   Within the past 12 months, you worried that your food would run out before you got the money to buy more. Never true   Within the past 12 months, the food you bought just didn't last and you didn't have money to get more. Never true   Stress   Do you feel stress - tense, restless, nervous, or anxious, or unable to sleep at night because your mind is troubled all the time - these days? Not at all   Social Connections   In a typical week, how many times do you talk on the phone with family, friends, or neighbors? Twice a week   How often do you get together with friends or relatives? Once   How often do you attend Yarsani or [employer] services? Never   Do you belong to any clubs or organizations such as Yarsani groups, unions, fraternal or athletic groups, or school groups? No   How often do you attend meetings of the clubs or organizations you belong to? Never   Are you  , , , , never , or living with a partner?    Intimate Partner Violence   Within the last year, have you been afraid of your partner or ex-partner? No   Within the last year, have you been humiliated or emotionally abused in other ways by your partner or ex-partner? No   Within the last year, have you been kicked, hit, slapped, or otherwise physically hurt by your partner or ex-partner? No   Within the last year, have you been raped or forced to have any kind of sexual activity by your partner or ex-partner? No   Alcohol Use   Q1: How often do you have a drink containing alcohol? Never   Q2: How many drinks containing alcohol do you have on a typical day when you are drinking? None   Q3: How often do you have six or more drinks on one occasion? Never   Utilities   In the past 12 months has the electric, gas, oil, or water company threatened to shut off services in your home? No   Health Literacy   How often do you need to have someone help you when you read instructions, pamphlets, or other written material from your doctor or pharmacy? Rarely         Terra Alatorre RN
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
## 2025-01-25 NOTE — PROGRESS NOTES
Eugenio Bliss is a 69 y.o. male on day 0 of admission presenting with Chest pain, unspecified type.    Patient lives with his son Shailesh in a one level house with three steps to enter. He uses a cane to ambulate. He is independent with ADLs, daily tasks, and drives. He uses [employer] at  as needed. PCP is Alison Lundberg CNP.   ADOD 01/27/2025  Plan is to discharge home with no needs, family will transport.     Terra Alatorre RN

## 2025-01-25 NOTE — PROGRESS NOTES
Eugenio Bliss is a 69 y.o. male on day 0 of admission presenting with Chest pain, unspecified type.       01/25/25 6599   Discharge Planning   Living Arrangements Children  (lives with his son Shailesh)   Support Systems Children;Family members   Assistance Needed none   Type of Residence Private residence   Number of Stairs to Enter Residence 3   Number of Stairs Within Residence 0   Do you have animals or pets at home? No   Who is requesting discharge planning? Provider   Home or Post Acute Services None   Expected Discharge Disposition Home   Does the patient need discharge transport arranged? No   Patient Choice   Provider Choice list and CMS website (https://medicare.gov/care-compare#search) for post-acute Quality and Resource Measure Data were provided and reviewed with: Other (Comment)  (no skilled needs anticipated)   Patient / Family choosing to utilize agency / facility established prior to hospitalization No   Stroke Family Assessment   Stroke Family Assessment Needed No         Terra Alatorre RN

## 2025-01-25 NOTE — PROGRESS NOTES
Eugenio Bliss is a 69 y.o. male on day 0 of admission presenting with Chest pain, unspecified type.       01/25/25 9042   ACS Disability Status   Are you deaf or do you have serious difficulty hearing? N   Are you blind or do you have serious difficulty seeing, even when wearing glasses? N   Because of a physical, mental, or emotional condition, do you have serious difficulty concentrating, remembering, or making decisions? (5 years old or older) N   Do you have serious difficulty walking or climbing stairs? N   Do you have serious difficulty dressing or bathing? N   Because of a physical, mental, or emotional condition, do you have serious difficulty doing errands alone such as visiting the doctor? N         Terra Alatorre RN

## 2025-01-26 VITALS
OXYGEN SATURATION: 94 % | HEART RATE: 67 BPM | DIASTOLIC BLOOD PRESSURE: 89 MMHG | BODY MASS INDEX: 33.62 KG/M2 | HEIGHT: 69 IN | SYSTOLIC BLOOD PRESSURE: 149 MMHG | TEMPERATURE: 97.9 F | WEIGHT: 227 LBS | RESPIRATION RATE: 17 BRPM

## 2025-01-26 PROCEDURE — 90662 IIV NO PRSV INCREASED AG IM: CPT | Performed by: INTERNAL MEDICINE

## 2025-01-26 PROCEDURE — G0008 ADMIN INFLUENZA VIRUS VAC: HCPCS | Performed by: INTERNAL MEDICINE

## 2025-01-26 PROCEDURE — G0378 HOSPITAL OBSERVATION PER HR: HCPCS

## 2025-01-26 PROCEDURE — 2500000004 HC RX 250 GENERAL PHARMACY W/ HCPCS (ALT 636 FOR OP/ED): Performed by: INTERNAL MEDICINE

## 2025-01-26 PROCEDURE — 2500000001 HC RX 250 WO HCPCS SELF ADMINISTERED DRUGS (ALT 637 FOR MEDICARE OP): Performed by: INTERNAL MEDICINE

## 2025-01-26 PROCEDURE — 2500000002 HC RX 250 W HCPCS SELF ADMINISTERED DRUGS (ALT 637 FOR MEDICARE OP, ALT 636 FOR OP/ED): Performed by: INTERNAL MEDICINE

## 2025-01-26 PROCEDURE — 99222 1ST HOSP IP/OBS MODERATE 55: CPT | Performed by: INTERNAL MEDICINE

## 2025-01-26 RX ADMIN — PILOCARPINE HYDROCHLORIDE 5 MG: 5 TABLET, FILM COATED ORAL at 09:54

## 2025-01-26 RX ADMIN — Medication 5000 UNITS: at 09:55

## 2025-01-26 RX ADMIN — BUSPIRONE HYDROCHLORIDE 10 MG: 10 TABLET ORAL at 09:55

## 2025-01-26 RX ADMIN — HYDROXYZINE HYDROCHLORIDE 25 MG: 25 TABLET, FILM COATED ORAL at 06:03

## 2025-01-26 RX ADMIN — HYDROXYZINE HYDROCHLORIDE 25 MG: 25 TABLET, FILM COATED ORAL at 12:57

## 2025-01-26 RX ADMIN — DOCUSATE SODIUM 100 MG: 100 CAPSULE, LIQUID FILLED ORAL at 09:55

## 2025-01-26 RX ADMIN — GABAPENTIN 100 MG: 100 CAPSULE ORAL at 09:59

## 2025-01-26 RX ADMIN — VILAZODONE HYDROCHLORIDE 40 MG: 20 TABLET ORAL at 09:54

## 2025-01-26 RX ADMIN — LEVOTHYROXINE SODIUM 50 MCG: 0.05 TABLET ORAL at 09:55

## 2025-01-26 RX ADMIN — HYDROXYCHLOROQUINE SULFATE 200 MG: 200 TABLET ORAL at 09:55

## 2025-01-26 RX ADMIN — APIXABAN 5 MG: 5 TABLET, FILM COATED ORAL at 09:54

## 2025-01-26 RX ADMIN — ACETAMINOPHEN 325 MG: 325 TABLET ORAL at 12:56

## 2025-01-26 RX ADMIN — ATORVASTATIN CALCIUM 40 MG: 40 TABLET, FILM COATED ORAL at 09:55

## 2025-01-26 RX ADMIN — ACETAMINOPHEN 325 MG: 325 TABLET ORAL at 06:03

## 2025-01-26 RX ADMIN — CYANOCOBALAMIN TAB 1000 MCG 1000 MCG: 1000 TAB at 09:55

## 2025-01-26 RX ADMIN — METOPROLOL TARTRATE 75 MG: 50 TABLET, FILM COATED ORAL at 09:55

## 2025-01-26 RX ADMIN — INFLUENZA A VIRUS A/VICTORIA/4897/2022 IVR-238 (H1N1) ANTIGEN (FORMALDEHYDE INACTIVATED), INFLUENZA A VIRUS A/CALIFORNIA/122/2022 SAN-022 (H3N2) ANTIGEN (FORMALDEHYDE INACTIVATED), AND INFLUENZA B VIRUS B/MICHIGAN/01/2021 ANTIGEN (FORMALDEHYDE INACTIVATED) 0.5 ML: 60; 60; 60 INJECTION, SUSPENSION INTRAMUSCULAR at 13:16

## 2025-01-26 RX ADMIN — ALLOPURINOL 100 MG: 100 TABLET ORAL at 09:55

## 2025-01-26 RX ADMIN — LURASIDONE HYDROCHLORIDE 40 MG: 40 TABLET, FILM COATED ORAL at 10:28

## 2025-01-26 RX ADMIN — LOSARTAN POTASSIUM 75 MG: 25 TABLET, FILM COATED ORAL at 09:55

## 2025-01-26 RX ADMIN — PANTOPRAZOLE SODIUM 20 MG: 20 TABLET, DELAYED RELEASE ORAL at 09:55

## 2025-01-26 ASSESSMENT — ENCOUNTER SYMPTOMS
WEIGHT LOSS: 0
NEAR-SYNCOPE: 0
DIZZINESS: 0
DYSPNEA ON EXERTION: 0
ORTHOPNEA: 0
PND: 0
WEAKNESS: 0
SYNCOPE: 0
MYALGIAS: 0
WEIGHT GAIN: 0
WHEEZING: 0
COUGH: 0
SHORTNESS OF BREATH: 1
CLAUDICATION: 0
FEVER: 0
DIAPHORESIS: 0
IRREGULAR HEARTBEAT: 0
PALPITATIONS: 0

## 2025-01-26 ASSESSMENT — PAIN - FUNCTIONAL ASSESSMENT: PAIN_FUNCTIONAL_ASSESSMENT: 0-10

## 2025-01-26 ASSESSMENT — PAIN SCALES - GENERAL
PAINLEVEL_OUTOF10: 0 - NO PAIN
PAINLEVEL_OUTOF10: 5 - MODERATE PAIN

## 2025-01-26 ASSESSMENT — PAIN DESCRIPTION - DESCRIPTORS: DESCRIPTORS: PRESSURE

## 2025-01-26 ASSESSMENT — PAIN DESCRIPTION - LOCATION: LOCATION: HEAD

## 2025-01-26 NOTE — CONSULTS
Inpatient consult to Cardiology  Consult performed by: Matt Pineda DO  Consult ordered by: Lucoi Guveara MD  Reason for consult: Chest pain        History Of Present Illness:    Eugenio Bliss is a 69 y.o. male presenting with chest pain.  He has a history of COPD, wears 3 L of oxygen at night via nasal cannula.  He also has a history of paroxysmal atrial fibrillation and is on Eliquis for oral anticoagulation.  He was recently diagnosed with pneumonia, noticed that beginning Friday he was experiencing a rather constant dull bilateral chest discomfort that radiated into his upper to mid abdomen.  There was no exertional component.  He denies any associated symptoms.  He decided to present to the emergency room for further evaluation.  He has a chest x-ray that suggests a right middle lobe pneumonia.  This was followed up by CT angiogram which was negative for PE but commented on severe coronary artery calcifications.  He has an EKG that shows sinus rhythm with LVH.  His high-sensitivity troponins are negative, proBNP is normal.  Remainder of his laboratory analysis shows normal kidney function and acceptable electrolytes.  His white count is in fact low, 4.1K.  He is currently resting comfortably, breathing room air saturating in the mid to high 90s.    As far as his cardiac history is concerned as mentioned he has a history of atrial fibrillation and is on metoprolol for rate control and Eliquis for oral anticoagulation.  His last echocardiogram was in January 2024 showing an ejection fraction of 60 to 65%, trivial valvular abnormalities.  His cardiologist is So Clark, he was last seen in the fall doing reasonably well from a cardiac standpoint.  He did see EP subsequently for some nonsustained ventricular tachycardia that was brief and infrequent on an event monitor.  Medical management was recommended.    Review of Systems   Constitutional: Negative for diaphoresis, fever, weight gain and weight  loss.   Eyes:  Negative for visual disturbance.   Cardiovascular:  Positive for chest pain. Negative for claudication, dyspnea on exertion, irregular heartbeat, leg swelling, near-syncope, orthopnea, palpitations, paroxysmal nocturnal dyspnea and syncope.   Respiratory:  Positive for shortness of breath. Negative for cough and wheezing.    Musculoskeletal:  Negative for muscle weakness and myalgias.   Neurological:  Negative for dizziness and weakness.   All other systems reviewed and are negative.         Last Recorded Vitals:  Vitals:    01/25/25 1806 01/25/25 1900 01/25/25 2300 01/26/25 0700   BP: (!) 166/104 (!) 161/95 114/67 137/83   BP Location: Left arm Left arm Left arm Left arm   Patient Position: Lying Lying Lying Lying   Pulse: 95 94 68 63   Resp: 20 19 17 16   Temp: 37.1 °C (98.8 °F) 37.5 °C (99.5 °F) 36.6 °C (97.9 °F) 36.9 °C (98.4 °F)   TempSrc: Oral Oral Oral Oral   SpO2: 95% 96% 95% 96%   Weight:       Height:           Last Labs:  CBC - 1/25/2025: 11:52 AM  4.1 15.2 111    44.8      CMP - 1/25/2025: 11:52 AM  9.3 7.7 17 --- 1.0   _ 4.2 14 70      PTT - No results in last year.  _   _ _     Troponin I, High Sensitivity   Date/Time Value Ref Range Status   01/25/2025 12:58 PM 5 0 - 20 ng/L Final   01/25/2025 11:52 AM 5 0 - 20 ng/L Final   12/08/2024 07:14 PM 7 0 - 20 ng/L Final     BNP   Date/Time Value Ref Range Status   01/25/2025 11:52 AM 15 0 - 99 pg/mL Final   12/08/2024 06:17 PM 37 0 - 99 pg/mL Final     POC HEMOGLOBIN A1c   Date/Time Value Ref Range Status   12/18/2024 03:21 PM 5.9 4.2 - 6.5 % Final   05/16/2023 03:18 PM 5.9 4.2 - 6.5 % Final     Hemoglobin A1C   Date/Time Value Ref Range Status   08/21/2024 10:19 AM 5.4 see below % Final   01/16/2024 09:01 AM 4.9 see below % Final     LDL Calculated   Date/Time Value Ref Range Status   08/21/2024 10:19 AM 27 <=99 mg/dL Final     Comment:                                 Near   Borderline      AGE      Desirable  Optimal    High     High      Very High     0-19 Y     0 - 109     ---    110-129   >/= 130     ----    20-24 Y     0 - 119     ---    120-159   >/= 160     ----      >24 Y     0 -  99   100-129  130-159   160-189     >/=190     05/03/2023 06:01 AM 55 (L) 65 - 130 MG/DL Final     VLDL   Date/Time Value Ref Range Status   08/21/2024 10:19 AM 25 0 - 40 mg/dL Final   01/27/2022 11:06 AM 31 0 - 40 mg/dL Final   05/17/2021 09:46 AM 59 (H) 0 - 40 mg/dL Final   11/25/2019 11:28 AM 17 0 - 40 mg/dL Final      Last I/O:  No intake/output data recorded.    Past Cardiology Tests (Last 3 Years):  EKG:  ECG 12 lead 12/08/2024      ECG 12 Lead 10/23/2024      ECG 12 lead (Clinic Performed) 09/10/2024 (Preliminary)      ECG 12 lead 03/03/2024      ECG 12 lead 01/05/2024      ECG 12 Lead 10/04/2023    Echo:  Transthoracic Echo (TTE) Complete 01/08/2024    Ejection Fractions:  EF   Date/Time Value Ref Range Status   01/08/2024 09:56 AM 60       Cath:  No results found for this or any previous visit from the past 1095 days.    Stress Test:  No results found for this or any previous visit from the past 1095 days.    Cardiac Imaging:  No results found for this or any previous visit from the past 1095 days.      Past Medical History:  He has a past medical history of Abdominal hernia (05/16/2023), Achilles tendinitis, right leg, Acute frontal sinusitis, unspecified (02/03/2020), Allergic rhinitis (05/16/2023), Body mass index (BMI) 39.0-39.9, adult (05/25/2021), CHF (congestive heart failure), Chondrocostal junction syndrome (tietze) (07/22/2013), Contact with and (suspected) exposure to covid-19, COPD (chronic obstructive pulmonary disease) (Multi), Deficiency of other specified B group vitamins (12/03/2019), Diabetes mellitus (Multi), Dry eye syndrome of unspecified lacrimal gland (12/29/2014), Effusion, right ankle (06/13/2018), Encounter for screening for malignant neoplasm of colon (05/19/2016), Encounter for screening for malignant neoplasm of prostate  (04/24/2019), Gallbladder attack (05/16/2023), Hypertension (05/21/2023), Inguinal hernia (05/21/2023), Obesity, unspecified (05/04/2022), Other conditions influencing health status (04/22/2013), Other conditions influencing health status (08/14/2019), Other conditions influencing health status (04/22/2013), Pain in right ankle and joints of right foot, Pain in unspecified elbow (07/10/2017), Personal history of diseases of the blood and blood-forming organs and certain disorders involving the immune mechanism (12/03/2019), Personal history of diseases of the blood and blood-forming organs and certain disorders involving the immune mechanism (12/03/2019), Personal history of other (healed) physical injury and trauma (04/16/2019), Personal history of other diseases of the digestive system (12/03/2019), Personal history of other diseases of the digestive system (07/08/2022), Personal history of other diseases of the respiratory system, Personal history of other diseases of urinary system (12/03/2019), Personal history of other endocrine, nutritional and metabolic disease (04/22/2013), Personal history of other endocrine, nutritional and metabolic disease (07/29/2016), Personal history of other specified conditions (10/17/2019), Personal history of other specified conditions, Personal history of other specified conditions (09/06/2013), Personal history of other specified conditions (04/22/2013), Personal history of pneumonia (recurrent) (09/04/2020), Pleurodynia (06/05/2020), Pneumonia of left lung due to infectious organism (05/21/2023), Pure hypercholesterolemia, unspecified (11/08/2013), Right knee pain (05/21/2023), Sjogren syndrome, unspecified (Multi), Snoring (05/21/2023), Stiffness of right ankle, not elsewhere classified (06/13/2018), 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    Past Surgical  History:  He has a past surgical history that includes Ankle surgery (04/17/2013); Knee surgery (04/17/2013); and Hernia repair (04/17/2013).      Social History:  He reports that he has never smoked. He has never used smokeless tobacco. He reports that he does not currently use alcohol. He reports that he does not use drugs.    Family History:  No family history on file.     Allergies:  Ciprofloxacin, Levofloxacin, and Penicillins    Inpatient Medications:  Scheduled medications   Medication Dose Route Frequency    acetaminophen  325 mg oral 4x daily    allopurinol  100 mg oral Daily    apixaban  5 mg oral BID    atorvastatin  40 mg oral Daily    azithromycin  500 mg oral Daily    busPIRone  10 mg oral BID    cholecalciferol  5,000 Units oral Daily    cyanocobalamin  1,000 mcg oral Daily    docusate sodium  100 mg oral BID    influenza  0.5 mL intramuscular During hospitalization    hydroxychloroquine  200 mg oral Daily    hydrOXYzine HCL  25 mg oral 4x daily    levothyroxine  50 mcg oral Daily    losartan  75 mg oral Daily    lurasidone  40 mg oral Daily    metoprolol tartrate  75 mg oral BID    pantoprazole  20 mg oral Daily    pilocarpine  5 mg oral BID    traZODone  300 mg oral Nightly    vilazodone  40 mg oral Daily with breakfast     PRN medications   Medication    albuterol    diclofenac sodium    gabapentin    oxygen     Continuous Medications   Medication Dose Last Rate    oxygen  2 L/min       Outpatient Medications:  Current Outpatient Medications   Medication Instructions    acetaminophen (TYLENOL) 325 mg, oral, 4 times daily    albuterol 90 mcg/actuation inhaler 2 puffs, inhalation, Every 4 hours PRN    allopurinol (ZYLOPRIM) 100 mg, oral, Daily    apixaban (ELIQUIS) 5 mg, oral, 2 times daily    atorvastatin (LIPITOR) 40 mg, oral, Daily    azithromycin (ZITHROMAX) 500 mg, oral, Daily    busPIRone (BUSPAR) 10 mg, 2 times daily    cholecalciferol (VITAMIN D-3) 5,000 Units, oral, Daily    diclofenac  sodium (VOLTAREN) 4 g, Topical, 4 times daily PRN    gabapentin (NEURONTIN) 100 mg, oral, Daily PRN    hydroxychloroquine (PLAQUENIL) 200 mg, oral, Daily    hydrOXYzine HCL (Atarax) 25 mg tablet TAKE 1 TABLET BY MOUTH EVERY DAY AT BEDTIME AS NEEDED    levothyroxine (SYNTHROID, LEVOXYL) 50 mcg, oral, Daily    losartan (COZAAR) 75 mg, oral, Daily    lurasidone (LATUDA) 40 mg, oral, Daily    metoprolol tartrate (LOPRESSOR) 75 mg, oral, 2 times daily    multivitamin tablet 1 tablet, Daily    nebulizer accessories kit 1 kit, miscellaneous, Every 4 hours PRN    oxygen (O2) 2 L/min, Continuous PRN    pantoprazole (PROTONIX) 20 mg, oral, Daily, Do not crush, chew, or split.    pilocarpine (SALAGEN (PILOCARPINE)) 5 mg, oral, 2 times daily    traZODone (DESYREL) 300 mg, Nightly    vilazodone (Viibryd) 40 mg tablet 1 tablet    Vitamin B-12 1,000 mcg, oral, Daily       Physical Exam:   Gen: NAD   Neck: no JVD, carotid upstroke is brisk and without delay   Heart: rrr, s1s2+ no mrg   Lungs: CTA   Ext: warm no edema     Assessment/Plan   Pneumonia  Chronic hypoxic respiratory failure  COPD  Atypical chest pain  Atherosclerotic heart disease  Paroxysmal atrial fibrillation    1/26: His chest pain is rather atypical.  High-sensitivity troponins are normal and his EKG is nonischemic.  From a cardiac standpoint I would consider him safe for discharge.  We can always complete an ischemic workup as an outpatient either with our office or with his cardiologist, would suggest that he follow-up in 1 to 2 weeks.  As for his coronary artery calcifications I feel it is safe to assume that he has coronary artery disease.  Agree with atorvastatin, would aim for a goal LDL of less than 70.  Thank you for the consult.    Peripheral IV Proximal;Right;Ventral Forearm (Active)   Site Assessment Clean;Intact;Dry 01/26/25 0731   Dressing Status Clean;Dry 01/26/25 0731   Number of days:        Code Status:  Full Code          Matt Pineda DO
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
## 2025-01-26 NOTE — CARE PLAN
The patient's goals for the shift include      The clinical goals for the shift include get pain meds    Over the shift, the patient did not make progress toward the following goals.   Problem: Fall/Injury  Goal: Not fall by end of shift  Outcome: Progressing  Goal: Be free from injury by end of the shift  Outcome: Progressing  Goal: Verbalize understanding of personal risk factors for fall in the hospital  Outcome: Progressing  Goal: Verbalize understanding of risk factor reduction measures to prevent injury from fall in the home  Outcome: Progressing  Goal: Use assistive devices by end of the shift  Outcome: Progressing  Goal: Pace activities to prevent fatigue by end of the shift  Outcome: Progressing     Problem: Diabetes  Goal: Achieve decreasing blood glucose levels by end of shift  Outcome: Progressing  Goal: Increase stability of blood glucose readings by end of shift  Outcome: Progressing  Goal: Decrease in ketones present in urine by end of shift  Outcome: Progressing  Goal: Maintain electrolyte levels within acceptable range throughout shift  Outcome: Progressing  Goal: Maintain glucose levels >70mg/dl to <250mg/dl throughout shift  Outcome: Progressing  Goal: No changes in neurological exam by end of shift  Outcome: Progressing  Goal: Learn about and adhere to nutrition recommendations by end of shift  Outcome: Progressing  Goal: Vital signs within normal range for age by end of shift  Outcome: Progressing  Goal: Increase self care and/or family involovement by end of shift  Outcome: Progressing  Goal: Receive DSME education by end of shift  Outcome: Progressing     Problem: Pain  Goal: Takes deep breaths with improved pain control throughout the shift  Outcome: Progressing  Goal: Turns in bed with improved pain control throughout the shift  Outcome: Progressing  Goal: Walks with improved pain control throughout the shift  Outcome: Progressing  Goal: Performs ADL's with improved pain control throughout  shift  Outcome: Progressing  Goal: Participates in PT with improved pain control throughout the shift  Outcome: Progressing  Goal: Free from opioid side effects throughout the shift  Outcome: Progressing  Goal: Free from acute confusion related to pain meds throughout the shift  Outcome: Progressing

## 2025-01-26 NOTE — PROGRESS NOTES
01/26/25 1207   Discharge Planning   Expected Discharge Disposition Home   Does the patient need discharge transport arranged? No     Patient with active discharge order, planned discharge home with no skilled needs

## 2025-01-26 NOTE — DISCHARGE SUMMARY
Discharge Diagnosis  Chest pain, unspecified type    Issues Requiring Follow-Up  Chest pain and chronic respiratory failure  Chronic A-fib  Hypertension  Coronary artery disease  Bipolar disorder    Discharge Meds     Medication List      CONTINUE taking these medications     nebulizer accessories kit; 1 kit every 4 hours if needed (wheezing).     ASK your doctor about these medications     acetaminophen 325 mg tablet; Commonly known as: Tylenol; Take 1 tablet   (325 mg) by mouth 4 times a day.   albuterol 90 mcg/actuation inhaler; Inhale 2 puffs every 4 hours if   needed for wheezing.   allopurinol 100 mg tablet; Commonly known as: Zyloprim; Take 1 tablet   (100 mg) by mouth once daily.   apixaban 5 mg tablet; Commonly known as: Eliquis; Take 1 tablet (5 mg)   by mouth 2 times a day.   atorvastatin 40 mg tablet; Commonly known as: Lipitor; Take 1 tablet (40   mg) by mouth once daily.   azithromycin 500 mg tablet; Commonly known as: Zithromax; Take 1 tablet   (500 mg) by mouth once daily for 5 days.   busPIRone 7.5 mg tablet; Commonly known as: Buspar   cholecalciferol 5,000 Units tablet; Commonly known as: Vitamin D-3; Take   1 tablet (5,000 Units) by mouth once daily.   diclofenac sodium 1 % gel; Commonly known as: Voltaren; Apply 4.5 inches   (4 g) topically 4 times a day as needed (arthritis).   gabapentin 100 mg capsule; Commonly known as: Neurontin; Take 1 capsule   (100 mg) by mouth once daily as needed (pain).   hydroxychloroquine 200 mg tablet; Commonly known as: Plaquenil; Take 1   tablet (200 mg) by mouth once daily.   hydrOXYzine HCL 25 mg tablet; Commonly known as: Atarax; TAKE 1 TABLET   BY MOUTH EVERY DAY AT BEDTIME AS NEEDED   levothyroxine 50 mcg tablet; Commonly known as: Synthroid, Levoxyl; Take   1 tablet (50 mcg) by mouth once daily.   losartan 50 mg tablet; Commonly known as: Cozaar; Take 1.5 tablets (75   mg) by mouth once daily.   lurasidone 40 mg tablet; Commonly known as: Latuda; TAKE 1  TABLET BY   MOUTH EVERY DAY   metoprolol tartrate 75 mg tablet; Commonly known as: Lopressor; Take 1   tablet (75 mg) by mouth 2 times a day.   multivitamin tablet   oxygen gas therapy; Commonly known as: O2   pantoprazole 20 mg EC tablet; Commonly known as: ProtoNix; Take 1 tablet   (20 mg) by mouth once daily. Do not crush, chew, or split.   pilocarpine 5 mg tablet; Commonly known as: Salagen (pilocarpine); Take   1 tablet (5 mg) by mouth 2 times a day.   traZODone 100 mg tablet; Commonly known as: Desyrel   vilazodone 40 mg tablet; Commonly known as: Viibryd   Vitamin B-12 1,000 mcg tablet; Generic drug: cyanocobalamin; TAKE 1   TABLET BY MOUTH ONCE DAILY       Test Results Pending At Discharge  Pending Labs       No current pending labs.            Hospital Course   5 mg aspirin Picardi examined and came back normal.  Patient was seen with cardiology.  Cardiology cleared the patient.  Patient is being discharged with a stable condition.    Pertinent Physical Exam At Time of Discharge  Physical Exam  HEENT no headaches any atraumatic no pallor  Chest clear to auscultation decubitus  Abdominal obese, soft, nontender multifunction extremity notable close  CNs no focal deficit.  Outpatient Follow-Up  Future Appointments   Date Time Provider Department Center   1/29/2025  1:30 PM RAJINDER [id number] PFT ROOM [id number] [facility]   4/8/2025  3:00 PM CJ Denise [id number] [place]   4/24/2025  3:20 PM CJ Anderson [id number] [facility]   6/17/2025  3:00 PM Viola Burns MD [id number] [facility]   9/9/2025  1:20 PM MADISYN SalgueroCNP [id number] [facility]         Lucio Guevara MD

## 2025-01-26 NOTE — ASSESSMENT & PLAN NOTE
Chronic respiratory failure  COPD  Gout  Osteoarthritis  Diabetes mellitus type 2  Spinal stenosis  Chronic kidney disease  Fatty liver  Heart failure  Bipolar disorder  Hyperlipidemia  Radiculopathy of the lumbar spine  Sjogren's syndrome  Coronary artery disease  Drug-induced dyskinesia   chronic atrial fibrillation

## 2025-01-26 NOTE — CARE PLAN
The patient's goals for the shift include  monitor pain    The clinical goals for the shift include safety

## 2025-01-28 ENCOUNTER — PATIENT OUTREACH (OUTPATIENT)
Dept: PRIMARY CARE | Facility: CLINIC | Age: 70
End: 2025-01-28
Payer: MEDICARE

## 2025-01-28 DIAGNOSIS — Z09 HOSPITAL DISCHARGE FOLLOW-UP: ICD-10-CM

## 2025-01-28 DIAGNOSIS — R07.9 CHEST PAIN, UNSPECIFIED TYPE: ICD-10-CM

## 2025-01-28 LAB
ATRIAL RATE: 111 BPM
Q ONSET: 210 MS
QRS COUNT: 18 BEATS
QRS DURATION: 92 MS
QT INTERVAL: 344 MS
QTC CALCULATION(BAZETT): 465 MS
QTC FREDERICIA: 421 MS
R AXIS: -42 DEGREES
T AXIS: 26 DEGREES
T OFFSET: 382 MS
VENTRICULAR RATE: 110 BPM

## 2025-01-28 NOTE — PROGRESS NOTES
TCM completed 01/27/25   Discharge Facility: [facility]  Discharge Diagnosis: Chest pain and chronic respiratory failure, Chronic A-fib, Hypertension, Coronary artery disease  Admission Date:  1/25/25  Discharge Date:  1/26/25    PCP Appointment Date:  4/24/25  Specialist Appointment Date: Pulmonology- 4/8/25  Hospital Encounter and Summary Linked: Yes                             Unable to reach patient within two business days after discharge. Voicemail left with contact information for patient to call back with any non-emergent questions or concerns. No return call as of this note.  Needs seen by: 2/11/25.

## 2025-01-29 ENCOUNTER — HOSPITAL ENCOUNTER (OUTPATIENT)
Dept: RESPIRATORY THERAPY | Facility: CLINIC | Age: 70
End: 2025-01-29
Payer: MEDICARE

## 2025-02-05 ENCOUNTER — TELEPHONE (OUTPATIENT)
Dept: PRIMARY CARE | Facility: CLINIC | Age: 70
End: 2025-02-05
Payer: MEDICARE

## 2025-02-05 DIAGNOSIS — N39.498 OTHER URINARY INCONTINENCE: Primary | ICD-10-CM

## 2025-02-06 ENCOUNTER — HOSPITAL ENCOUNTER (EMERGENCY)
Facility: HOSPITAL | Age: 70
Discharge: HOME | End: 2025-02-06
Payer: MEDICARE

## 2025-02-06 ENCOUNTER — APPOINTMENT (OUTPATIENT)
Dept: RADIOLOGY | Facility: HOSPITAL | Age: 70
End: 2025-02-06
Payer: MEDICARE

## 2025-02-06 VITALS
HEART RATE: 110 BPM | OXYGEN SATURATION: 95 % | RESPIRATION RATE: 18 BRPM | SYSTOLIC BLOOD PRESSURE: 120 MMHG | TEMPERATURE: 98.1 F | DIASTOLIC BLOOD PRESSURE: 84 MMHG

## 2025-02-06 DIAGNOSIS — M77.50 TENDINITIS OF ANKLE OR FOOT: Primary | ICD-10-CM

## 2025-02-06 DIAGNOSIS — M79.671 RIGHT FOOT PAIN: ICD-10-CM

## 2025-02-06 PROCEDURE — 73630 X-RAY EXAM OF FOOT: CPT | Mod: RIGHT SIDE | Performed by: RADIOLOGY

## 2025-02-06 PROCEDURE — 99284 EMERGENCY DEPT VISIT MOD MDM: CPT

## 2025-02-06 PROCEDURE — 73630 X-RAY EXAM OF FOOT: CPT | Mod: RT

## 2025-02-06 PROCEDURE — 73610 X-RAY EXAM OF ANKLE: CPT | Mod: RT

## 2025-02-06 PROCEDURE — 73610 X-RAY EXAM OF ANKLE: CPT | Mod: RIGHT SIDE | Performed by: RADIOLOGY

## 2025-02-06 PROCEDURE — 2500000001 HC RX 250 WO HCPCS SELF ADMINISTERED DRUGS (ALT 637 FOR MEDICARE OP)

## 2025-02-06 RX ORDER — OXYCODONE HYDROCHLORIDE 5 MG/1
5 TABLET ORAL ONCE
Status: COMPLETED | OUTPATIENT
Start: 2025-02-06 | End: 2025-02-06

## 2025-02-06 RX ORDER — MELOXICAM 15 MG/1
15 TABLET ORAL DAILY
Qty: 30 TABLET | Refills: 0 | Status: SHIPPED | OUTPATIENT
Start: 2025-02-06 | End: 2025-03-08

## 2025-02-06 RX ADMIN — OXYCODONE HYDROCHLORIDE 5 MG: 5 TABLET ORAL at 11:53

## 2025-02-06 ASSESSMENT — PAIN DESCRIPTION - PAIN TYPE: TYPE: ACUTE PAIN

## 2025-02-06 ASSESSMENT — PAIN - FUNCTIONAL ASSESSMENT: PAIN_FUNCTIONAL_ASSESSMENT: 0-10

## 2025-02-06 ASSESSMENT — PAIN SCALES - GENERAL: PAINLEVEL_OUTOF10: 10 - WORST POSSIBLE PAIN

## 2025-02-06 ASSESSMENT — PAIN DESCRIPTION - LOCATION: LOCATION: ANKLE

## 2025-02-06 NOTE — DISCHARGE INSTRUCTIONS
Please continue to rest, ice and elevate the right foot and use Tylenol and meloxicam or ibuprofen for pain relief.  You may also wrap the foot with Ace wrap to keep compression and prevent swelling.  Follow-up with the orthopedic surgeon or your primary care provider for reevaluation of your symptoms within 1 week.    It is important to remember that your care does not end here and you must continue to monitor your condition closely. Please return to the emergency department for any worsening or concerning signs or symptoms as directed by our conversations and the discharge instructions. If you do not have a doctor please contact the referral number on your discharge instructions. Please contact any physician specialists provided in your discharge notes as it is very important to follow up with them regarding your condition. If you are unable to reach the physicians provided, please come back to the Emergency Department at any time.
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
## 2025-02-10 ENCOUNTER — APPOINTMENT (OUTPATIENT)
Dept: RESPIRATORY THERAPY | Facility: CLINIC | Age: 70
End: 2025-02-10
Payer: MEDICARE

## 2025-02-25 ENCOUNTER — APPOINTMENT (OUTPATIENT)
Dept: PRIMARY CARE | Facility: CLINIC | Age: 70
End: 2025-02-25
Payer: MEDICARE

## 2025-02-25 VITALS
SYSTOLIC BLOOD PRESSURE: 149 MMHG | BODY MASS INDEX: 34.36 KG/M2 | DIASTOLIC BLOOD PRESSURE: 91 MMHG | OXYGEN SATURATION: 93 % | HEART RATE: 94 BPM | WEIGHT: 232 LBS | HEIGHT: 69 IN

## 2025-02-25 DIAGNOSIS — M32.9 SYSTEMIC LUPUS ERYTHEMATOSUS, UNSPECIFIED SLE TYPE, UNSPECIFIED ORGAN INVOLVEMENT STATUS (MULTI): ICD-10-CM

## 2025-02-25 DIAGNOSIS — M35.00 SJOGREN'S SYNDROME, WITH UNSPECIFIED ORGAN INVOLVEMENT (MULTI): ICD-10-CM

## 2025-02-25 DIAGNOSIS — M06.9 RHEUMATOID ARTHRITIS INVOLVING MULTIPLE SITES, UNSPECIFIED WHETHER RHEUMATOID FACTOR PRESENT (MULTI): Primary | ICD-10-CM

## 2025-02-25 PROCEDURE — 3077F SYST BP >= 140 MM HG: CPT | Performed by: NURSE PRACTITIONER

## 2025-02-25 PROCEDURE — 4010F ACE/ARB THERAPY RXD/TAKEN: CPT | Performed by: NURSE PRACTITIONER

## 2025-02-25 PROCEDURE — 3008F BODY MASS INDEX DOCD: CPT | Performed by: NURSE PRACTITIONER

## 2025-02-25 PROCEDURE — 1036F TOBACCO NON-USER: CPT | Performed by: NURSE PRACTITIONER

## 2025-02-25 PROCEDURE — 1157F ADVNC CARE PLAN IN RCRD: CPT | Performed by: NURSE PRACTITIONER

## 2025-02-25 PROCEDURE — 1123F ACP DISCUSS/DSCN MKR DOCD: CPT | Performed by: NURSE PRACTITIONER

## 2025-02-25 PROCEDURE — 3080F DIAST BP >= 90 MM HG: CPT | Performed by: NURSE PRACTITIONER

## 2025-02-25 PROCEDURE — 99213 OFFICE O/P EST LOW 20 MIN: CPT | Performed by: NURSE PRACTITIONER

## 2025-02-25 PROCEDURE — 1158F ADVNC CARE PLAN TLK DOCD: CPT | Performed by: NURSE PRACTITIONER

## 2025-02-25 PROCEDURE — 1159F MED LIST DOCD IN RCRD: CPT | Performed by: NURSE PRACTITIONER

## 2025-02-25 ASSESSMENT — ENCOUNTER SYMPTOMS
DEPRESSION: 0
LOSS OF SENSATION IN FEET: 0
OCCASIONAL FEELINGS OF UNSTEADINESS: 1

## 2025-02-25 NOTE — PROGRESS NOTES
"Subjective   Patient ID: Eugenio Bliss is a 69 y.o. male who presents for Follow-up (Patient is here today for a ER visit follow up on his right ankle swelling/ pain).    69 year old male here for follow up ED for ankle edema. This has since resolved.... was told the swelling was under the achilles. Was iced, elevated.   Asking for a referral for rheum- saw opthal this am who recommended rheum. I placed a referral he has not called yet.   Asking for a referral for veins in his legs. He thinks the veins are too big. Muscle pain. Joint pain. Low back pain.            Review of Systems    Objective   BP (!) 149/91   Pulse 94   Ht 1.753 m (5' 9\")   Wt 105 kg (232 lb)   SpO2 93%   BMI 34.26 kg/m²     Physical Exam  Constitutional:       Appearance: Normal appearance.   Cardiovascular:      Rate and Rhythm: Normal rate and regular rhythm.      Pulses: Normal pulses.      Comments: Occasional extra heartbeat  Pulmonary:      Effort: Pulmonary effort is normal.      Breath sounds: Normal breath sounds.   Musculoskeletal:         General: Normal range of motion.      Comments: No edema, non tender, palpable pedal pulse   Skin:     General: Skin is warm.   Neurological:      General: No focal deficit present.      Mental Status: He is alert and oriented to person, place, and time.   Psychiatric:         Mood and Affect: Mood normal.         Behavior: Behavior normal.         Assessment/Plan   Diagnoses and all orders for this visit:  Rheumatoid arthritis involving multiple sites, unspecified whether rheumatoid factor present (Multi)  Comments:  i think his onging S&S are from his mult autoimmune diseases progression. Office staff helped to schedule Rheum  Orders:  -     Referral to Rheumatology; Future  Sjogren's syndrome, with unspecified organ involvement (Multi)  -     Referral to Rheumatology; Future  Systemic lupus erythematosus, unspecified SLE type, unspecified organ involvement status (Multi)  -     Referral to " Rheumatology; Future

## 2025-03-05 LAB — BODY SURFACE AREA: 2.26 M2

## 2025-04-07 LAB — BODY SURFACE AREA: 2.26 M2

## 2025-04-08 ENCOUNTER — APPOINTMENT (OUTPATIENT)
Dept: PULMONOLOGY | Facility: CLINIC | Age: 70
End: 2025-04-08
Payer: MEDICARE

## 2025-04-16 ENCOUNTER — HOSPITAL ENCOUNTER (EMERGENCY)
Facility: HOSPITAL | Age: 70
Discharge: HOME | End: 2025-04-16
Attending: EMERGENCY MEDICINE
Payer: MEDICARE

## 2025-04-16 ENCOUNTER — APPOINTMENT (OUTPATIENT)
Dept: RADIOLOGY | Facility: HOSPITAL | Age: 70
End: 2025-04-16
Payer: MEDICARE

## 2025-04-16 VITALS
BODY MASS INDEX: 34.07 KG/M2 | OXYGEN SATURATION: 93 % | RESPIRATION RATE: 30 BRPM | WEIGHT: 230 LBS | HEART RATE: 97 BPM | HEIGHT: 69 IN | TEMPERATURE: 96.4 F | DIASTOLIC BLOOD PRESSURE: 84 MMHG | SYSTOLIC BLOOD PRESSURE: 142 MMHG

## 2025-04-16 DIAGNOSIS — S09.90XA CLOSED HEAD INJURY, INITIAL ENCOUNTER: ICD-10-CM
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
## 2025-04-16 PROCEDURE — 71101 X-RAY EXAM UNILAT RIBS/CHEST: CPT | Mod: LT

## 2025-04-16 PROCEDURE — 72125 CT NECK SPINE W/O DYE: CPT | Performed by: STUDENT IN AN ORGANIZED HEALTH CARE EDUCATION/TRAINING PROGRAM

## 2025-04-16 PROCEDURE — 99285 EMERGENCY DEPT VISIT HI MDM: CPT | Mod: 25

## 2025-04-16 PROCEDURE — 2500000001 HC RX 250 WO HCPCS SELF ADMINISTERED DRUGS (ALT 637 FOR MEDICARE OP): Performed by: PHYSICIAN ASSISTANT

## 2025-04-16 PROCEDURE — 99284 EMERGENCY DEPT VISIT MOD MDM: CPT | Mod: 25 | Performed by: EMERGENCY MEDICINE

## 2025-04-16 PROCEDURE — 70450 CT HEAD/BRAIN W/O DYE: CPT | Performed by: STUDENT IN AN ORGANIZED HEALTH CARE EDUCATION/TRAINING PROGRAM

## 2025-04-16 PROCEDURE — 70450 CT HEAD/BRAIN W/O DYE: CPT

## 2025-04-16 PROCEDURE — 72125 CT NECK SPINE W/O DYE: CPT

## 2025-04-16 RX ORDER — ACETAMINOPHEN 500 MG
1000 TABLET ORAL ONCE
Status: COMPLETED | OUTPATIENT
Start: 2025-04-16 | End: 2025-04-16

## 2025-04-16 RX ADMIN — ACETAMINOPHEN 1000 MG: 500 TABLET ORAL at 15:05

## 2025-04-16 ASSESSMENT — PAIN SCALES - GENERAL
PAINLEVEL_OUTOF10: 7
PAINLEVEL_OUTOF10: 7

## 2025-04-16 ASSESSMENT — LIFESTYLE VARIABLES
TOTAL SCORE: 0
HAVE YOU EVER FELT YOU SHOULD CUT DOWN ON YOUR DRINKING: NO
HAVE PEOPLE ANNOYED YOU BY CRITICIZING YOUR DRINKING: NO
EVER FELT BAD OR GUILTY ABOUT YOUR DRINKING: NO
EVER HAD A DRINK FIRST THING IN THE MORNING TO STEADY YOUR NERVES TO GET RID OF A HANGOVER: NO

## 2025-04-16 ASSESSMENT — PAIN - FUNCTIONAL ASSESSMENT
PAIN_FUNCTIONAL_ASSESSMENT: 0-10
PAIN_FUNCTIONAL_ASSESSMENT: 0-10
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
## 2025-04-16 NOTE — ED PROVIDER NOTES
IN BRIEF    History: 69-year-old male presents the emergency department as an HIA.  Shortly prior to arrival he lost his balance and fell forward striking his face on the ground.  He is on Eliquis for atrial fibrillation.  He had immediate epistaxis but denies any nose pain or bleeding currently.  This was witnessed by family.  There was no loss of consciousness.  No other acute complaints.    Exam: Patient has dried blood around the nares but no evidence of active epistaxis.  Heart is irregularly irregular.  There is no swelling or tenderness to palpation of the nasal bridge, no deformity.  There is a small scrape to the right lateral eyebrow however family states that this is from a fall last night.       ED COURSE   MDM: Patient presents emergency department for evaluation after fall.  He is on Eliquis.  Head neck CT obtained which returned showing no acute intracranial abnormalities.      I personally saw the patient and made/approved the management plan and take responsibility for the patient management.  Parts of this chart were completed with dictation software, please excuse any errors in transcription.     Lakshmi Gomez, DO  1:40 PM

## 2025-04-16 NOTE — DISCHARGE INSTRUCTIONS
Your imaging today was negative.  Please take Tylenol as needed for pain.  Follow-up with your primary care physician.  Return to the emergency department any concerns.

## 2025-04-16 NOTE — ED TRIAGE NOTES
Pt fell about an hour ago. He is on eliquis, did hit his head, nose bleeding - controlled. Denies any other injuries at this time. Denies LOC at this time

## 2025-04-17 ENCOUNTER — OFFICE VISIT (OUTPATIENT)
Dept: PRIMARY CARE | Facility: CLINIC | Age: 70
End: 2025-04-17
Payer: MEDICARE

## 2025-04-17 VITALS — OXYGEN SATURATION: 91 % | HEART RATE: 66 BPM | SYSTOLIC BLOOD PRESSURE: 109 MMHG | DIASTOLIC BLOOD PRESSURE: 84 MMHG

## 2025-04-17 DIAGNOSIS — Z79.01 CURRENT USE OF LONG TERM ANTICOAGULATION: ICD-10-CM

## 2025-04-17 DIAGNOSIS — R41.89 ACUTE COGNITIVE DECLINE: ICD-10-CM

## 2025-04-17 DIAGNOSIS — R29.6 FALLS: Primary | ICD-10-CM

## 2025-04-17 DIAGNOSIS — G25.9 EXTRAPYRAMIDAL DISORDER: ICD-10-CM

## 2025-04-17 PROBLEM — E11.9 DIABETES MELLITUS (MULTI): Status: RESOLVED | Noted: 2023-05-16 | Resolved: 2025-04-17

## 2025-04-17 PROCEDURE — 1157F ADVNC CARE PLAN IN RCRD: CPT | Performed by: NURSE PRACTITIONER

## 2025-04-17 PROCEDURE — 1160F RVW MEDS BY RX/DR IN RCRD: CPT | Performed by: NURSE PRACTITIONER

## 2025-04-17 PROCEDURE — 1123F ACP DISCUSS/DSCN MKR DOCD: CPT | Performed by: NURSE PRACTITIONER

## 2025-04-17 PROCEDURE — 1036F TOBACCO NON-USER: CPT | Performed by: NURSE PRACTITIONER

## 2025-04-17 PROCEDURE — 1159F MED LIST DOCD IN RCRD: CPT | Performed by: NURSE PRACTITIONER

## 2025-04-17 PROCEDURE — 99214 OFFICE O/P EST MOD 30 MIN: CPT | Performed by: NURSE PRACTITIONER

## 2025-04-17 ASSESSMENT — ENCOUNTER SYMPTOMS
DEPRESSION: 1
OCCASIONAL FEELINGS OF UNSTEADINESS: 1
LOSS OF SENSATION IN FEET: 0

## 2025-04-17 ASSESSMENT — PATIENT HEALTH QUESTIONNAIRE - PHQ9
1. LITTLE INTEREST OR PLEASURE IN DOING THINGS: SEVERAL DAYS
SUM OF ALL RESPONSES TO PHQ9 QUESTIONS 1 AND 2: 2
10. IF YOU CHECKED OFF ANY PROBLEMS, HOW DIFFICULT HAVE THESE PROBLEMS MADE IT FOR YOU TO DO YOUR WORK, TAKE CARE OF THINGS AT HOME, OR GET ALONG WITH OTHER PEOPLE: SOMEWHAT DIFFICULT
2. FEELING DOWN, DEPRESSED OR HOPELESS: SEVERAL DAYS

## 2025-04-17 NOTE — PROGRESS NOTES
"Subjective   Patient ID: Eugenio Bliss is a 69 y.o. male who presents for Fall (Patient mentioned he fell in a restaurant and went to the ER. ).    69 year old male here accompanied by his son.   They are here due to an ED visit last night after falling while at a restaurant.   Son states he has been falling and not telling him. Eugenio states he does not remember falling.   Eugenio states yesterday at the restaurant he stood to put his coat on, the coat got tangled behind him then he fell. He does not remember tripping on anything. Hit his face.   Son states he is Forgetful, poor balance, lightheaded easily, no longer driving as he is forgetting where places are.   Recent OV with psych with addition of a new med for \"my movement of my mouth\".   Unaware of family history of Parkinson's or other movement disorder  Prediabetes-not on medication  SLE, Sjogren's, RA-he has coming up soon.  I reviewed all medications including dose and frequency with patient and son to make sure everything was up-to-date.  Eugenio manages his own medications.  I recommend getting a pillbox that his son putting the medications in the box 1 week at a time  CT of the head was done while in the emergency room showed microvascular disease the brain and atrophy.  He is on Eliquis 5 mg p.o. twice daily  Insomnia-psychiatry prescribes trazodone patient states trazodone does not work    Fall         Review of Systems    Objective   /84   Pulse 66   SpO2 91%     Physical Exam  Constitutional:       Appearance: He is obese.   HENT:      Head: Normocephalic and atraumatic.   Pulmonary:      Effort: Pulmonary effort is normal.   Musculoskeletal:      Cervical back: Normal range of motion and neck supple.   Skin:     Comments: Abrasion to the bridge of his nose and right temple   Neurological:      General: No focal deficit present.      Mental Status: He is alert and oriented to person, place, and time. Mental status is at baseline.   Psychiatric: "         Mood and Affect: Mood normal.      Comments: More prominent full body extraparametal symptoms.  Tongue rolling is about the same as previous exams.          Assessment/Plan   Diagnoses and all orders for this visit:  Falls  Comments:  Unstable gait  Orders:  -     Referral to Neurology; Future  Extrapyramidal disorder  Comments:  I wonder about parkinsonian syndrome related to psych meds.  He has an unstable posture, stiffness, cognitive decline, poor sleep  Orders:  -     Referral to Neurology; Future  Acute cognitive decline  Comments:  High risk for vascular dementia.  But I also wonder about parkinsonian syndrome  Orders:  -     Referral to Neurology; Future  Current use of long term anticoagulation  Comments:  High risk bleeding with falls.

## 2025-04-22 ENCOUNTER — APPOINTMENT (OUTPATIENT)
Dept: UROLOGY | Facility: CLINIC | Age: 70
End: 2025-04-22
Payer: MEDICARE

## 2025-04-22 DIAGNOSIS — R39.11 BENIGN PROSTATIC HYPERPLASIA WITH URINARY HESITANCY: Primary | ICD-10-CM

## 2025-04-22 DIAGNOSIS — N40.1 BENIGN PROSTATIC HYPERPLASIA WITH URINARY HESITANCY: Primary | ICD-10-CM

## 2025-04-22 LAB
POC APPEARANCE, URINE: CLEAR
POC BILIRUBIN, URINE: NEGATIVE
POC BLOOD, URINE: NEGATIVE
POC COLOR, URINE: YELLOW
POC GLUCOSE, URINE: NEGATIVE MG/DL
POC KETONES, URINE: NEGATIVE MG/DL
POC LEUKOCYTES, URINE: NEGATIVE
POC NITRITE,URINE: NEGATIVE
POC PH, URINE: 6 PH
POC PROTEIN, URINE: NEGATIVE MG/DL
POC SPECIFIC GRAVITY, URINE: 1.02
POC UROBILINOGEN, URINE: 1 EU/DL

## 2025-04-22 PROCEDURE — 1125F AMNT PAIN NOTED PAIN PRSNT: CPT | Performed by: SURGERY

## 2025-04-22 PROCEDURE — 1160F RVW MEDS BY RX/DR IN RCRD: CPT | Performed by: SURGERY

## 2025-04-22 PROCEDURE — 1036F TOBACCO NON-USER: CPT | Performed by: SURGERY

## 2025-04-22 PROCEDURE — 81003 URINALYSIS AUTO W/O SCOPE: CPT | Performed by: SURGERY

## 2025-04-22 PROCEDURE — 1157F ADVNC CARE PLAN IN RCRD: CPT | Performed by: SURGERY

## 2025-04-22 PROCEDURE — 1123F ACP DISCUSS/DSCN MKR DOCD: CPT | Performed by: SURGERY

## 2025-04-22 PROCEDURE — 1159F MED LIST DOCD IN RCRD: CPT | Performed by: SURGERY

## 2025-04-22 PROCEDURE — 51798 US URINE CAPACITY MEASURE: CPT | Performed by: SURGERY

## 2025-04-22 PROCEDURE — 99203 OFFICE O/P NEW LOW 30 MIN: CPT | Performed by: SURGERY

## 2025-04-22 RX ORDER — FLUOXETINE HYDROCHLORIDE 40 MG/1
40 CAPSULE ORAL EVERY EVENING
COMMUNITY
Start: 2025-02-11

## 2025-04-22 RX ORDER — TAMSULOSIN HYDROCHLORIDE 0.4 MG/1
0.4 CAPSULE ORAL EVERY EVENING
Qty: 30 CAPSULE | Refills: 0 | Status: SHIPPED | OUTPATIENT
Start: 2025-04-22

## 2025-04-22 ASSESSMENT — PAIN SCALES - GENERAL: PAINLEVEL_OUTOF10: 5

## 2025-04-22 NOTE — PROGRESS NOTES
Subjective   Patient ID: Eugenio Bliss is a 69 y.o. male who presents for Establish Care.    HPI  He comes in with his son today.  His main complaint is lower urinary tract symptoms.  He has episodes of urgency and urge incontinence.  He also has a weak stream and incomplete emptying.  He does have a prior history of a stroke.  He denies any dysuria or hematuria.  He reports nocturia x 1.  He is fairly bothered by his symptoms.  He has no bowel problems.          Review of Systems  As per HPI, remaining 10 systems negative            Objective   Physical Exam  Well nourished  Abdomen soft, ND, NT, mildly obese  No hernias  Circumcised, patent meatus, no lesions  Bilateral descended testes, no masses  ISABELLA - prostate smooth, 40 grams, no nodules        PVR = 10 ml            Assessment/Plan   Problem List Items Addressed This Visit    None  Visit Diagnoses         Codes      Benign prostatic hyperplasia with urinary hesitancy    -  Primary N40.1, R39.11    Relevant Orders    POCT UA Automated manually resulted (Completed)    Measure post void residual    Urinalysis with Reflex Microscopic    Urine Culture             His postvoid residual today is normal.  We will send his urine for analysis and culture.  We did discuss the natural history of BPH and LUTS.  I will start him on Flomax 0.4 mg every evening.  We will follow-up with him in 1 month.            Tamica Gil MD 04/22/25 1:43 PM

## 2025-04-24 ENCOUNTER — APPOINTMENT (OUTPATIENT)
Dept: PRIMARY CARE | Facility: CLINIC | Age: 70
End: 2025-04-24
Payer: MEDICARE

## 2025-04-24 LAB
APPEARANCE UR: CLEAR
BACTERIA UR CULT: NORMAL
BILIRUB UR QL STRIP: NEGATIVE
COLOR UR: YELLOW
GLUCOSE UR QL STRIP: NEGATIVE
HGB UR QL STRIP: NEGATIVE
KETONES UR QL STRIP: NEGATIVE
LEUKOCYTE ESTERASE UR QL STRIP: NEGATIVE
NITRITE UR QL STRIP: NEGATIVE
PH UR STRIP: 6 [PH] (ref 5–8)
PROT UR QL STRIP: NEGATIVE
SP GR UR STRIP: 1.01 (ref 1–1.03)

## 2025-05-22 ENCOUNTER — APPOINTMENT (OUTPATIENT)
Dept: UROLOGY | Facility: CLINIC | Age: 70
End: 2025-05-22
Payer: MEDICARE

## 2025-06-10 ENCOUNTER — APPOINTMENT (OUTPATIENT)
Dept: PRIMARY CARE | Facility: CLINIC | Age: 70
End: 2025-06-10
Payer: MEDICARE

## 2025-06-17 ENCOUNTER — APPOINTMENT (OUTPATIENT)
Dept: RHEUMATOLOGY | Facility: CLINIC | Age: 70
End: 2025-06-17
Payer: MEDICARE

## 2025-06-17 ENCOUNTER — APPOINTMENT (OUTPATIENT)
Dept: LAB | Facility: HOSPITAL | Age: 70
End: 2025-06-17
Payer: MEDICARE

## 2025-06-17 VITALS
DIASTOLIC BLOOD PRESSURE: 72 MMHG | OXYGEN SATURATION: 95 % | WEIGHT: 230 LBS | SYSTOLIC BLOOD PRESSURE: 122 MMHG | BODY MASS INDEX: 33.97 KG/M2 | HEART RATE: 91 BPM

## 2025-06-17 DIAGNOSIS — M10.00 IDIOPATHIC GOUT, UNSPECIFIED CHRONICITY, UNSPECIFIED SITE: ICD-10-CM

## 2025-06-17 DIAGNOSIS — E55.9 VITAMIN D DEFICIENCY: ICD-10-CM

## 2025-06-17 DIAGNOSIS — M1A.4690 OTHER SECONDARY CHRONIC GOUT OF KNEE WITHOUT TOPHUS, UNSPECIFIED LATERALITY: ICD-10-CM

## 2025-06-17 DIAGNOSIS — M35.00 SJOGREN'S SYNDROME, WITH UNSPECIFIED ORGAN INVOLVEMENT (MULTI): ICD-10-CM

## 2025-06-17 DIAGNOSIS — M11.20 PSEUDOGOUT: Primary | ICD-10-CM

## 2025-06-17 PROCEDURE — 84165 PROTEIN E-PHORESIS SERUM: CPT

## 2025-06-17 PROCEDURE — 1159F MED LIST DOCD IN RCRD: CPT | Performed by: INTERNAL MEDICINE

## 2025-06-17 PROCEDURE — 1125F AMNT PAIN NOTED PAIN PRSNT: CPT | Performed by: INTERNAL MEDICINE

## 2025-06-17 PROCEDURE — 99204 OFFICE O/P NEW MOD 45 MIN: CPT | Performed by: INTERNAL MEDICINE

## 2025-06-17 PROCEDURE — 84155 ASSAY OF PROTEIN SERUM: CPT

## 2025-06-17 ASSESSMENT — ENCOUNTER SYMPTOMS
LOSS OF SENSATION IN FEET: 0
DEPRESSION: 0
OCCASIONAL FEELINGS OF UNSTEADINESS: 1

## 2025-06-17 ASSESSMENT — PAIN SCALES - GENERAL: PAINLEVEL_OUTOF10: 5

## 2025-06-17 NOTE — PROGRESS NOTES
Subjective   Patient ID: Eugenio Bliss is a 69 y.o. male who presents for New Patient Visit.  HPI  Patient referred for previous diagnosis of Sjogren's.  He also says that he has a diagnosis of systemic lupus    He is not a good historian of his health and says his PCP had been prescribing his hydroxychloroquine.  He did not remember that he had been seen by rheumatology previously.  Seen by Dr. Soares and Dr. Houston previously.  Has had positive CHRISTOPHER, anti-SSA, SSB.  He has more dry mouth and dry eyes.  His PCPs have been prescribing his Plaquenil for a number of years.  The last time he had seen Dr. Soares was in 2017.    He does have movements that could be due to possible Parkinson's and has an appointment with neurology next month.    Reviewing his previous records with Dr. Soares he had been on pilocarpine previously.  Also has had a history of ITP and osteoarthritis.    He has had swelling in the left knee off and on.  He also has a history of gout and is currently on allopurinol.  Reviewing his records he used to have a uric acid greater than 10    When he had seen Dr. Houston they had recommended methotrexate.  He is uncertain if he had started this.      Review of Systems    Objective   Physical Exam    Assessment/Plan   {Assess/PlanSmartLinks:25680}  Sjogren's with positive SSA SSB and CHRISTOPHER.  Uncertain if he has a diagnosis of lupus.  Previous diagnosis of ITP was mentioned as well.           Viola Burns MD 06/17/25 3:31 PM    murmur   Gastrointestinal:         Loose stool and abdominal pains off and on   Genitourinary:  Positive for dysuria.   Musculoskeletal:  Positive for arthralgias, gait problem, joint swelling and myalgias.   Skin:  Negative for rash.   Neurological:  Positive for numbness and headaches.        Memory issues   Hematological:  Bruises/bleeds easily.       Objective   Physical Exam  GEN: NAD A&O x3 appropriate affect wheelchair   HENT:positive mouth dryness no enlarged parotid or sublingual glands  EYES:  no conjunctival redness, eyelids normal  LYMPH: no cervical  lymphadenopathy  SKIN: No rashes seen on exposed skin  PULSES: +radials  MSK:  No synovitis DIP PIP MCP wrist hypertrophic changes knees  Assessment/Plan     Sjogren's with positive SSA SSB and CHRISTOPHER.  Uncertain if he has a diagnosis of lupus.  Previous diagnosis of ITP was mentioned as well.   - Will redo his blood work concerning his autoimmune disease.  Has been on hydroxychloroquine and reminded to have his eyes examined at least once a year.    He has multiple issues and currently does look like he has a movement disorder.  Will be seeing neurology next month.  There is mention of history of chronic tardive dyskinesia that was thought to be secondary to his psychiatric medications When he was seen by neurologist Dr. Cruz.  Has had stroke workup in January 2024.  Also has symptoms that could be consistent with osteoarthritis and possible pseudogout.Reviewed his x-rays including his right foot and ankle which did show moderate midfoot and ankle degenerative changes and significant right distal Achilles soft tissue swelling with calcaneal spurs.  There was chondrocalcinosis seen on the x-rays.   Also has history of gout with elevated uric acid.  Will recheck his uric acid to make sure that he is at goal for uric acid less than 6.  He has difficulty with ambulation.  Does have some cognitive issues.    Viola Burns MD 06/17/25 3:31 PM

## 2025-06-18 LAB — PROT SERPL-MCNC: 6.8 G/DL (ref 6.4–8.2)

## 2025-06-19 LAB
25(OH)D3+25(OH)D2 SERPL-MCNC: 60 NG/ML (ref 30–100)
ALBUMIN SERPL-MCNC: 4 G/DL (ref 3.6–5.1)
ALP SERPL-CCNC: 82 U/L (ref 35–144)
ALT SERPL-CCNC: 22 U/L (ref 9–46)
ANA PAT SER IF-IMP: ABNORMAL
ANA SER QL IF: POSITIVE
ANA TITR SER IF: ABNORMAL TITER
ANION GAP SERPL CALCULATED.4IONS-SCNC: 9 MMOL/L (CALC) (ref 7–17)
AST SERPL-CCNC: 25 U/L (ref 10–35)
BILIRUB SERPL-MCNC: 0.8 MG/DL (ref 0.2–1.2)
BUN SERPL-MCNC: 13 MG/DL (ref 7–25)
CALCIUM SERPL-MCNC: 8.6 MG/DL (ref 8.6–10.3)
CHLORIDE SERPL-SCNC: 107 MMOL/L (ref 98–110)
CO2 SERPL-SCNC: 25 MMOL/L (ref 20–32)
CREAT SERPL-MCNC: 1.19 MG/DL (ref 0.7–1.28)
CRP SERPL-MCNC: <3 MG/L
DSDNA AB SER-ACNC: 1 IU/ML
EGFRCR SERPLBLD CKD-EPI 2021: 66 ML/MIN/1.73M2
ENA RNP AB SER-ACNC: ABNORMAL AI
ENA SM AB SER IA-ACNC: ABNORMAL AI
ENA SM+RNP AB SER IA-ACNC: ABNORMAL AI
ERYTHROCYTE [SEDIMENTATION RATE] IN BLOOD BY WESTERGREN METHOD: 6 MM/H
GLUCOSE SERPL-MCNC: 177 MG/DL (ref 65–99)
LABORATORY COMMENT REPORT: ABNORMAL
MAGNESIUM SERPL-MCNC: 1.7 MG/DL (ref 1.5–2.5)
NUCLEOSOME AB SER IA-ACNC: ABNORMAL AI
POTASSIUM SERPL-SCNC: 3.5 MMOL/L (ref 3.5–5.3)
PROT SERPL-MCNC: 6.8 G/DL (ref 6.1–8.1)
RHEUMATOID FACT SERPL-ACNC: 45 IU/ML
SODIUM SERPL-SCNC: 141 MMOL/L (ref 135–146)
URATE SERPL-MCNC: 8.4 MG/DL (ref 4–8)

## 2025-06-20 LAB
ALBUMIN: 3.7 G/DL (ref 3.4–5)
ALPHA 1 GLOBULIN: 0.3 G/DL (ref 0.2–0.6)
ALPHA 2 GLOBULIN: 0.7 G/DL (ref 0.4–1.1)
BETA GLOBULIN: 0.6 G/DL (ref 0.5–1.2)
GAMMA GLOBULIN: 1.5 G/DL (ref 0.5–1.4)
PATH REVIEW-SERUM PROTEIN ELECTROPHORESIS: ABNORMAL
PROTEIN ELECTROPHORESIS COMMENT: ABNORMAL

## 2025-07-06 RX ORDER — ALLOPURINOL 100 MG/1
200 TABLET ORAL DAILY
Qty: 180 TABLET | Refills: 3 | Status: SHIPPED | OUTPATIENT
Start: 2025-07-06 | End: 2026-07-06

## 2025-07-06 ASSESSMENT — ENCOUNTER SYMPTOMS
MYALGIAS: 1
DYSURIA: 1
HEADACHES: 1
ARTHRALGIAS: 1
SHORTNESS OF BREATH: 1
JOINT SWELLING: 1
NUMBNESS: 1
BRUISES/BLEEDS EASILY: 1

## 2025-07-10 DIAGNOSIS — F41.9 ANXIETY DISORDER, UNSPECIFIED TYPE: ICD-10-CM

## 2025-07-10 DIAGNOSIS — F33.9 RECURRENT MAJOR DEPRESSIVE DISORDER, REMISSION STATUS UNSPECIFIED: ICD-10-CM

## 2025-07-10 RX ORDER — LURASIDONE HYDROCHLORIDE 40 MG/1
40 TABLET, FILM COATED ORAL DAILY
Qty: 30 TABLET | Refills: 5 | Status: SHIPPED | OUTPATIENT
Start: 2025-07-10

## 2025-07-14 ENCOUNTER — APPOINTMENT (OUTPATIENT)
Dept: NEUROLOGY | Facility: CLINIC | Age: 70
End: 2025-07-14
Payer: MEDICARE

## 2025-07-14 VITALS
SYSTOLIC BLOOD PRESSURE: 110 MMHG | HEIGHT: 69 IN | BODY MASS INDEX: 33.97 KG/M2 | DIASTOLIC BLOOD PRESSURE: 68 MMHG | HEART RATE: 97 BPM

## 2025-07-14 DIAGNOSIS — R29.6 FALLS: ICD-10-CM

## 2025-07-14 DIAGNOSIS — G25.9 EXTRAPYRAMIDAL DISORDER: ICD-10-CM

## 2025-07-14 DIAGNOSIS — R41.89 ACUTE COGNITIVE DECLINE: ICD-10-CM

## 2025-07-14 PROCEDURE — 1036F TOBACCO NON-USER: CPT | Performed by: PSYCHIATRY & NEUROLOGY

## 2025-07-14 PROCEDURE — 1159F MED LIST DOCD IN RCRD: CPT | Performed by: PSYCHIATRY & NEUROLOGY

## 2025-07-14 PROCEDURE — 99215 OFFICE O/P EST HI 40 MIN: CPT | Performed by: PSYCHIATRY & NEUROLOGY

## 2025-07-14 NOTE — PATIENT INSTRUCTIONS
Thanks for coming in today.  You have evidence of cognitive impairment, but it is possible that at least some of your cognitive impairment is due to your prior stroke and undertreated depression/anxiety.  Please ask your Psychiatrist about whether he has been treated for the neuroleptic extrapyramidal disease- has he failed some medication (s)?  Is his depression/anxiety fully treated?  Please schedule a follow up if Dr. Song releases him to follow up with me?

## 2025-07-14 NOTE — PROGRESS NOTES
"Jeronimo Bliss is a 69 y.o.   male.  HPI  This is a 69 year old man, here with his son, for complaint of extrapyramidal disorder and to rule out dementia.  He has had orobuccal dyskinesias for 2 years. His son states his father was stressed from the protracted dementia, kidney disease, and her death 11/24.  He has been more forgetful, poor balance, lightheaded more easil, has had 4 falls that his son knows about, and stopped driving- forgets where locations are.   He has been treated by Psychiatry for depression and anxiety, on Latuda or similar drugs for \"years\".  SH: finished high school, previously a supervisor at [employer]  In 2018- he had a loss of interest in cleaning the house, his body.  He was previously seen in 2/24- for stroke workup, he is on medications to prevent stroke.  He had a CT of the head- 4/2025: chronic microvascular changes    Objective   Neurological Exam  MMSE: 21/30, able to give 7 words beginning with the letter \"F\" in one minute  Alert, pleasant, has obvious akathisia  He has prominent orobuccal dyskinesias  He has mild hypomimia  He has normal eye movements  Facial muscles are symmetric  He has normal muscle tone, no bradykinesia  Gait- somewhat slow, positive pull test, moves upper extremities well.  Physical Exam  I personally reviewed laboratory, radiographic, and medical studies which were pertinent for nothing.    Assessment/Plan     Thanks for coming in today.  You have evidence of cognitive impairment, but it is possible that at least some of your cognitive impairment is due to your prior stroke and undertreated depression/anxiety.  Please ask your Psychiatrist about whether he has been treated for the neuroleptic extrapyramidal disease- has he failed some medication (s)?  Is his depression/anxiety fully treated?  Please schedule a follow up if Dr. Song releases him to follow up with me?  "

## 2025-07-15 ASSESSMENT — MINI MENTAL STATE EXAM
SUM ALL MMSE QUESTIONS FOR TOTAL SCORE [OUT OF 30].: 21
SHOW: PENCIL [OBJECT] ASK: WHAT IS THIS CALLED?: 2 CORRECT
PLACE DESIGN, ERASER AND PENCIL IN FRONT OF THE PERSON.  SAY:  COPY THIS DESIGN PLEASE.  SHOW: DESIGN. ALLOW: MULTIPLE TRIES. WAIT UNTIL PERSON IS FINISHED AND HANDS IT BACK. SCORE: ONLY FOR DIAGRAM WITH 4-SIDED FIGURE BETWEEN TWO 5-SIDED FIGURES: 1 CORRECT
SAY: I WOULD LIKE YOU TO REPEAT THIS PHRASE AFTER ME: NO IFS, ANDS, OR BUTS.: 1 CORRECT
HAND THE PERSON A PENCIL AND PAPER. SAY:  WRITE ANY COMPLETE SENTENCE ON THAT PIECE OF PAPER. (NOTE: THE SENTENCE MUST MAKE SENSE.  IGNORE SPELLING ERRORS): 1 CORRECT
WHAT STATE, COUNTRY, CITY, HOSPITAL, FLOOR: 3 CORRECT
SPELL THE WORD WORLD FORWARD AND BACKWARDS OR SERIAL 7S: 1 CORRECT
PLEASE COPY THIS PICTURE (NOTE ALL 10 ANGLES MUST BE PRESENT AND TWO MUST INTERSECT): 0 CORRECT
NAME OR REPEAT 3 OBJECTS - (APPLE, TABLE, PENNY) OR (BALL, TREE, FLAG): 3 CORRECT
RECALL THE 3 OBJECTS FROM ABOVE (APPLE, TABLE, PENNY) OR (BALL, TREE, FLAG): 1 CORRECT
WHAT IS THE YEAR, SEASON, DATE, DAY, AND MONTH: 5 CORRECT
SAY:  READ THE WORDS ON THE PAGE AND THEN DO WHAT IT SAYS.  THEN HAND THE PERSON THE SHEET WITH CLOSE YOUR EYES ON IT.  IF THE SUBJECT READS AND DOES NOT CLOSE THEIR EYES, REPEAT UP TO THREE TIMES.  SCORE ONLY IF SUBJECT CLOSES EYES.: 3 CORRECT

## 2025-07-18 LAB — VIT B12 SERPL-MCNC: 422 PG/ML (ref 200–1100)

## 2025-07-21 ENCOUNTER — HOSPITAL ENCOUNTER (OUTPATIENT)
Dept: RADIOLOGY | Facility: HOSPITAL | Age: 70
Discharge: HOME | End: 2025-07-21
Payer: MEDICARE

## 2025-07-21 DIAGNOSIS — R41.89 ACUTE COGNITIVE DECLINE: ICD-10-CM

## 2025-07-21 DIAGNOSIS — G25.9 EXTRAPYRAMIDAL DISORDER: ICD-10-CM

## 2025-07-21 PROCEDURE — 70551 MRI BRAIN STEM W/O DYE: CPT | Performed by: RADIOLOGY

## 2025-07-21 PROCEDURE — 70551 MRI BRAIN STEM W/O DYE: CPT

## 2025-09-04 ENCOUNTER — APPOINTMENT (OUTPATIENT)
Dept: PRIMARY CARE | Facility: CLINIC | Age: 70
End: 2025-09-04
Payer: MEDICARE

## 2025-09-04 ASSESSMENT — ACTIVITIES OF DAILY LIVING (ADL)
TAKING_MEDICATION: INDEPENDENT
BATHING: INDEPENDENT
GROCERY_SHOPPING: INDEPENDENT
DOING_HOUSEWORK: INDEPENDENT
MANAGING_FINANCES: INDEPENDENT
DRESSING: INDEPENDENT

## 2025-09-04 ASSESSMENT — PATIENT HEALTH QUESTIONNAIRE - PHQ9
2. FEELING DOWN, DEPRESSED OR HOPELESS: NOT AT ALL
1. LITTLE INTEREST OR PLEASURE IN DOING THINGS: NOT AT ALL
SUM OF ALL RESPONSES TO PHQ9 QUESTIONS 1 AND 2: 0

## 2025-09-05 ENCOUNTER — HOSPITAL ENCOUNTER (EMERGENCY)
Facility: HOSPITAL | Age: 70
Discharge: STILL ADMITTED OR ADMINISTRATIVE | End: 2025-09-05
Payer: MEDICARE

## 2025-09-05 ENCOUNTER — HOSPITAL ENCOUNTER (OUTPATIENT)
Dept: RADIOLOGY | Facility: HOSPITAL | Age: 70
Discharge: HOME | End: 2025-09-05
Payer: MEDICARE

## 2025-09-05 DIAGNOSIS — M25.511 ACUTE PAIN OF RIGHT SHOULDER: ICD-10-CM

## 2025-09-05 PROCEDURE — 73030 X-RAY EXAM OF SHOULDER: CPT | Mod: RT

## 2025-09-05 PROCEDURE — 99282 EMERGENCY DEPT VISIT SF MDM: CPT

## 2025-12-09 ENCOUNTER — APPOINTMENT (OUTPATIENT)
Dept: NEUROLOGY | Facility: CLINIC | Age: 70
End: 2025-12-09
Payer: MEDICARE

## 2025-12-11 ENCOUNTER — APPOINTMENT (OUTPATIENT)
Dept: PHYSICAL MEDICINE AND REHAB | Facility: CLINIC | Age: 70
End: 2025-12-11
Payer: MEDICARE

## 2026-01-06 ENCOUNTER — APPOINTMENT (OUTPATIENT)
Dept: RHEUMATOLOGY | Facility: CLINIC | Age: 71
End: 2026-01-06
Payer: MEDICARE

## 2026-03-09 ENCOUNTER — APPOINTMENT (OUTPATIENT)
Dept: PRIMARY CARE | Facility: CLINIC | Age: 71
End: 2026-03-09
Payer: MEDICARE